# Patient Record
Sex: MALE | Race: WHITE | Employment: OTHER | URBAN - METROPOLITAN AREA
[De-identification: names, ages, dates, MRNs, and addresses within clinical notes are randomized per-mention and may not be internally consistent; named-entity substitution may affect disease eponyms.]

---

## 2020-08-10 ENCOUNTER — TELEPHONE (OUTPATIENT)
Dept: PRIMARY CARE CLINIC | Age: 85
End: 2020-08-10

## 2020-08-10 DIAGNOSIS — E11.40 TYPE 2 DIABETES MELLITUS WITH DIABETIC NEUROPATHY, UNSPECIFIED WHETHER LONG TERM INSULIN USE (HCC): Primary | ICD-10-CM

## 2020-08-10 DIAGNOSIS — E11.40 TYPE 2 DIABETES MELLITUS WITH DIABETIC NEUROPATHY, UNSPECIFIED WHETHER LONG TERM INSULIN USE (HCC): ICD-10-CM

## 2020-08-10 RX ORDER — LANCETS 28 GAUGE
28 EACH MISCELLANEOUS 2 TIMES DAILY
Qty: 100 LANCET | Refills: 4 | Status: SHIPPED | OUTPATIENT
Start: 2020-08-10 | End: 2020-08-11 | Stop reason: SDUPTHER

## 2020-08-10 RX ORDER — LANCETS 28 GAUGE
28 EACH MISCELLANEOUS 2 TIMES DAILY
COMMUNITY
End: 2020-08-10 | Stop reason: SDUPTHER

## 2020-08-11 RX ORDER — LANCETS 28 GAUGE
28 EACH MISCELLANEOUS 2 TIMES DAILY
Qty: 100 LANCET | Refills: 4 | Status: SHIPPED | OUTPATIENT
Start: 2020-08-11 | End: 2021-02-08 | Stop reason: SDUPTHER

## 2020-08-13 RX ORDER — PEN NEEDLE, DIABETIC 31 GX3/16"
NEEDLE, DISPOSABLE MISCELLANEOUS AS DIRECTED
COMMUNITY
End: 2020-08-13 | Stop reason: SDUPTHER

## 2020-08-13 RX ORDER — PEN NEEDLE, DIABETIC 31 GX3/16"
NEEDLE, DISPOSABLE MISCELLANEOUS
Qty: 100 PEN NEEDLE | Refills: 4 | Status: SHIPPED | OUTPATIENT
Start: 2020-08-13 | End: 2021-02-08 | Stop reason: SDUPTHER

## 2020-09-08 LAB — EF %, EXTERNAL: NORMAL

## 2020-12-08 PROBLEM — N28.9 RENAL INSUFFICIENCY: Status: ACTIVE | Noted: 2020-12-08

## 2020-12-08 PROBLEM — D69.6 THROMBOCYTOPENIC DISORDER (HCC): Status: ACTIVE | Noted: 2020-12-08

## 2020-12-08 PROBLEM — E78.5 HYPERLIPIDEMIA: Status: ACTIVE | Noted: 2020-12-08

## 2020-12-08 PROBLEM — E11.9 TYPE II DIABETES MELLITUS (HCC): Status: ACTIVE | Noted: 2020-12-08

## 2020-12-08 PROBLEM — I10 HYPERTENSIVE DISORDER: Status: ACTIVE | Noted: 2020-12-08

## 2020-12-08 PROBLEM — E11.43 AUTONOMIC NEUROPATHY DUE TO DIABETES (HCC): Status: ACTIVE | Noted: 2020-12-08

## 2021-02-08 ENCOUNTER — OFFICE VISIT (OUTPATIENT)
Dept: PRIMARY CARE CLINIC | Age: 86
End: 2021-02-08
Payer: MEDICARE

## 2021-02-08 VITALS
WEIGHT: 191 LBS | BODY MASS INDEX: 29.98 KG/M2 | HEART RATE: 69 BPM | OXYGEN SATURATION: 98 % | SYSTOLIC BLOOD PRESSURE: 150 MMHG | TEMPERATURE: 97.1 F | RESPIRATION RATE: 16 BRPM | HEIGHT: 67 IN | DIASTOLIC BLOOD PRESSURE: 69 MMHG

## 2021-02-08 DIAGNOSIS — M79.674 TOE PAIN, BILATERAL: ICD-10-CM

## 2021-02-08 DIAGNOSIS — N28.9 RENAL INSUFFICIENCY: ICD-10-CM

## 2021-02-08 DIAGNOSIS — M79.675 TOE PAIN, BILATERAL: ICD-10-CM

## 2021-02-08 DIAGNOSIS — I10 ESSENTIAL HYPERTENSION: Primary | ICD-10-CM

## 2021-02-08 DIAGNOSIS — E11.40 TYPE 2 DIABETES MELLITUS WITH DIABETIC NEUROPATHY, UNSPECIFIED WHETHER LONG TERM INSULIN USE (HCC): ICD-10-CM

## 2021-02-08 DIAGNOSIS — E78.2 MIXED HYPERLIPIDEMIA: ICD-10-CM

## 2021-02-08 PROCEDURE — 99214 OFFICE O/P EST MOD 30 MIN: CPT | Performed by: NURSE PRACTITIONER

## 2021-02-08 PROCEDURE — G8536 NO DOC ELDER MAL SCRN: HCPCS | Performed by: NURSE PRACTITIONER

## 2021-02-08 PROCEDURE — G8419 CALC BMI OUT NRM PARAM NOF/U: HCPCS | Performed by: NURSE PRACTITIONER

## 2021-02-08 PROCEDURE — G8427 DOCREV CUR MEDS BY ELIG CLIN: HCPCS | Performed by: NURSE PRACTITIONER

## 2021-02-08 PROCEDURE — 1101F PT FALLS ASSESS-DOCD LE1/YR: CPT | Performed by: NURSE PRACTITIONER

## 2021-02-08 PROCEDURE — G8432 DEP SCR NOT DOC, RNG: HCPCS | Performed by: NURSE PRACTITIONER

## 2021-02-08 RX ORDER — INSULIN GLARGINE 100 [IU]/ML
INJECTION, SOLUTION SUBCUTANEOUS
COMMUNITY
End: 2021-02-08 | Stop reason: SDUPTHER

## 2021-02-08 RX ORDER — SIMVASTATIN 20 MG/1
TABLET, FILM COATED ORAL
Qty: 90 TAB | Refills: 1 | Status: SHIPPED | OUTPATIENT
Start: 2021-02-08 | End: 2021-10-11 | Stop reason: SDUPTHER

## 2021-02-08 RX ORDER — SITAGLIPTIN 50 MG/1
50 TABLET, FILM COATED ORAL 2 TIMES DAILY
Qty: 180 TAB | Refills: 1 | Status: SHIPPED | OUTPATIENT
Start: 2021-02-08 | End: 2021-11-24 | Stop reason: SDUPTHER

## 2021-02-08 RX ORDER — LANCETS 28 GAUGE
28 EACH MISCELLANEOUS 2 TIMES DAILY
Qty: 100 LANCET | Refills: 4 | Status: SHIPPED | OUTPATIENT
Start: 2021-02-08

## 2021-02-08 RX ORDER — SIMVASTATIN 20 MG/1
TABLET, FILM COATED ORAL
COMMUNITY
End: 2021-02-08 | Stop reason: SDUPTHER

## 2021-02-08 RX ORDER — SITAGLIPTIN 50 MG/1
TABLET, FILM COATED ORAL
COMMUNITY
Start: 2020-12-08 | End: 2021-02-08 | Stop reason: SDUPTHER

## 2021-02-08 RX ORDER — INDAPAMIDE 1.25 MG/1
TABLET, FILM COATED ORAL
COMMUNITY
End: 2021-02-08 | Stop reason: SDUPTHER

## 2021-02-08 RX ORDER — PEN NEEDLE, DIABETIC 31 GX3/16"
NEEDLE, DISPOSABLE MISCELLANEOUS
Qty: 100 PEN NEEDLE | Refills: 4 | Status: SHIPPED | OUTPATIENT
Start: 2021-02-08 | End: 2021-11-30 | Stop reason: SDUPTHER

## 2021-02-08 RX ORDER — GABAPENTIN 100 MG/1
CAPSULE ORAL
COMMUNITY
End: 2021-02-08 | Stop reason: SDUPTHER

## 2021-02-08 RX ORDER — GABAPENTIN 100 MG/1
CAPSULE ORAL
Qty: 270 CAP | Refills: 1 | Status: SHIPPED | OUTPATIENT
Start: 2021-02-08 | End: 2021-05-04 | Stop reason: SDUPTHER

## 2021-02-08 RX ORDER — METOPROLOL SUCCINATE 50 MG/1
TABLET, EXTENDED RELEASE ORAL
COMMUNITY
End: 2021-02-08 | Stop reason: SDUPTHER

## 2021-02-08 RX ORDER — INSULIN GLARGINE 100 [IU]/ML
INJECTION, SOLUTION SUBCUTANEOUS
Qty: 3 PEN | Refills: 1 | Status: SHIPPED | OUTPATIENT
Start: 2021-02-08 | End: 2021-11-30 | Stop reason: SDUPTHER

## 2021-02-08 RX ORDER — INSULIN GLARGINE 300 U/ML
INJECTION, SOLUTION SUBCUTANEOUS
COMMUNITY
End: 2021-02-08

## 2021-02-08 RX ORDER — METOPROLOL SUCCINATE 50 MG/1
TABLET, EXTENDED RELEASE ORAL
Qty: 90 TAB | Refills: 1 | Status: SHIPPED | OUTPATIENT
Start: 2021-02-08 | End: 2021-11-24 | Stop reason: SDUPTHER

## 2021-02-08 RX ORDER — INDAPAMIDE 1.25 MG/1
TABLET, FILM COATED ORAL
Qty: 90 TAB | Refills: 1 | Status: SHIPPED | OUTPATIENT
Start: 2021-02-08 | End: 2021-08-30

## 2021-02-08 NOTE — PATIENT INSTRUCTIONS
High Blood Pressure: Care Instructions Overview It's normal for blood pressure to go up and down throughout the day. But if it stays up, you have high blood pressure. Another name for high blood pressure is hypertension. Despite what a lot of people think, high blood pressure usually doesn't cause headaches or make you feel dizzy or lightheaded. It usually has no symptoms. But it does increase your risk of stroke, heart attack, and other problems. You and your doctor will talk about your risks of these problems based on your blood pressure. Your doctor will give you a goal for your blood pressure. Your goal will be based on your health and your age. Lifestyle changes, such as eating healthy and being active, are always important to help lower blood pressure. You might also take medicine to reach your blood pressure goal. 
Follow-up care is a key part of your treatment and safety. Be sure to make and go to all appointments, and call your doctor if you are having problems. It's also a good idea to know your test results and keep a list of the medicines you take. How can you care for yourself at home? Medical treatment · If you stop taking your medicine, your blood pressure will go back up. You may take one or more types of medicine to lower your blood pressure. Be safe with medicines. Take your medicine exactly as prescribed. Call your doctor if you think you are having a problem with your medicine. · Talk to your doctor before you start taking aspirin every day. Aspirin can help certain people lower their risk of a heart attack or stroke. But taking aspirin isn't right for everyone, because it can cause serious bleeding. · See your doctor regularly. You may need to see the doctor more often at first or until your blood pressure comes down. · If you are taking blood pressure medicine, talk to your doctor before you take decongestants or anti-inflammatory medicine, such as ibuprofen. Some of these medicines can raise blood pressure. · Learn how to check your blood pressure at home. Lifestyle changes · Stay at a healthy weight. This is especially important if you put on weight around the waist. Losing even 10 pounds can help you lower your blood pressure. · If your doctor recommends it, get more exercise. Walking is a good choice. Bit by bit, increase the amount you walk every day. Try for at least 30 minutes on most days of the week. You also may want to swim, bike, or do other activities. · Avoid or limit alcohol. Talk to your doctor about whether you can drink any alcohol. · Try to limit how much sodium you eat to less than 2,300 milligrams (mg) a day. Your doctor may ask you to try to eat less than 1,500 mg a day. · Eat plenty of fruits (such as bananas and oranges), vegetables, legumes, whole grains, and low-fat dairy products. · Lower the amount of saturated fat in your diet. Saturated fat is found in animal products such as milk, cheese, and meat. Limiting these foods may help you lose weight and also lower your risk for heart disease. · Do not smoke. Smoking increases your risk for heart attack and stroke. If you need help quitting, talk to your doctor about stop-smoking programs and medicines. These can increase your chances of quitting for good. When should you call for help? Call  911 anytime you think you may need emergency care. This may mean having symptoms that suggest that your blood pressure is causing a serious heart or blood vessel problem. Your blood pressure may be over 180/120. For example, call 911 if: 
  · You have symptoms of a heart attack. These may include: 
? Chest pain or pressure, or a strange feeling in the chest. 
? Sweating. ? Shortness of breath. ? Nausea or vomiting. ? Pain, pressure, or a strange feeling in the back, neck, jaw, or upper belly or in one or both shoulders or arms. ? Lightheadedness or sudden weakness. ? A fast or irregular heartbeat.  
  · You have symptoms of a stroke. These may include: 
? Sudden numbness, tingling, weakness, or loss of movement in your face, arm, or leg, especially on only one side of your body. ? Sudden vision changes. ? Sudden trouble speaking. ? Sudden confusion or trouble understanding simple statements. ? Sudden problems with walking or balance. ? A sudden, severe headache that is different from past headaches.  
  · You have severe back or belly pain. Do not wait until your blood pressure comes down on its own. Get help right away. Call your doctor now or seek immediate care if: 
  · Your blood pressure is much higher than normal (such as 180/120 or higher), but you don't have symptoms.  
  · You think high blood pressure is causing symptoms, such as: 
? Severe headache. 
? Blurry vision. Watch closely for changes in your health, and be sure to contact your doctor if: 
  · Your blood pressure measures higher than your doctor recommends at least 2 times. That means the top number is higher or the bottom number is higher, or both.  
  · You think you may be having side effects from your blood pressure medicine. Where can you learn more? Go to http://www.gray.com/ Enter O629 in the search box to learn more about \"High Blood Pressure: Care Instructions. \" Current as of: December 16, 2019               Content Version: 12.6 © 2150-5137 [employer]. Care instructions adapted under license by [employer] (which disclaims liability or warranty for this information). If you have questions about a medical condition or this instruction, always ask your healthcare professional. [address] any warranty or liability for your use of this information. Diabetes Foot Health: Care Instructions Your Care Instructions When you have diabetes, your feet need extra care and attention. Diabetes can damage the nerve endings and blood vessels in your feet, making you less likely to notice when your feet are injured. Diabetes also limits your body's ability to fight infection and get blood to areas that need it. If you get a minor foot injury, it could become an ulcer or a serious infection. With good foot care, you can prevent most of these problems. Caring for your feet can be quick and easy. Most of the care can be done when you are bathing or getting ready for bed. Follow-up care is a key part of your treatment and safety. Be sure to make and go to all appointments, and call your doctor if you are having problems. It's also a good idea to know your test results and keep a list of the medicines you take. How can you care for yourself at home? · Keep your blood sugar close to normal by watching what and how much you eat, monitoring blood sugar, taking medicines if prescribed, and getting regular exercise. · Do not smoke. Smoking affects blood flow and can make foot problems worse. If you need help quitting, talk to your doctor about stop-smoking programs and medicines. These can increase your chances of quitting for good. · Eat a diet that is low in fats. High fat intake can cause fat to build up in your blood vessels and decrease blood flow. · Inspect your feet daily for blisters, cuts, cracks, or sores. If you cannot see well, use a mirror or have someone help you. · Take care of your feet: 
? Wash your feet every day. Use warm (not hot) water. Check the water temperature with your wrists or other part of your body, not your feet. ? Dry your feet well. Pat them dry. Do not rub the skin on your feet too hard. Dry well between your toes. If the skin on your feet stays moist, bacteria or a fungus can grow, which can lead to infection. ? Keep your skin soft. Use moisturizing skin cream to keep the skin on your feet soft and prevent calluses and cracks. But do not put the cream between your toes, and stop using any cream that causes a rash. ? Clean underneath your toenails carefully. Do not use a sharp object to clean underneath your toenails. Use the blunt end of a nail file or other rounded tool. ? Trim and file your toenails straight across to prevent ingrown toenails. Use a nail clipper, not scissors. Use an emery board to smooth the edges. · Change socks daily. Socks without seams are best, because seams often rub the feet. You can find socks for people with diabetes from specialty catalogs. · Look inside your shoes every day for things like gravel or torn linings, which could cause blisters or sores. · Buy shoes that fit well: 
? Look for shoes that have plenty of space around the toes. This helps prevent bunions and blisters. ? Try on shoes while wearing the kind of socks you will usually wear with the shoes. ? Avoid plastic shoes. They may rub your feet and cause blisters. Good shoes should be made of materials that are flexible and breathable, such as leather or cloth. ? Break in new shoes slowly by wearing them for no more than an hour a day for several days. Take extra time to check your feet for red areas, blisters, or other problems after you wear new shoes. · Do not go barefoot. Do not wear sandals, and do not wear shoes with very thin soles. Thin soles are easy to puncture. They also do not protect your feet from hot pavement or cold weather. · Have your doctor check your feet during each visit. If you have a foot problem, see your doctor. Do not try to treat an early foot problem at home. Home remedies or treatments that you can buy without a prescription (such as corn removers) can be harmful. · Always get early treatment for foot problems. A minor irritation can lead to a major problem if not properly cared for early. When should you call for help? 
 Call your doctor now or seek immediate medical care if: 
  · You have a foot sore, an ulcer or break in the skin that is not healing after 4 days, bleeding corns or calluses, or an ingrown toenail.  
  · You have blue or black areas, which can mean bruising or blood flow problems.  
  · You have peeling skin or tiny blisters between your toes or cracking or oozing of the skin.  
  · You have a fever for more than 24 hours and a foot sore.  
  · You have new numbness or tingling in your feet that does not go away after you move your feet or change positions.  
  · You have unexplained or unusual swelling of the foot or ankle.  
Watch closely for changes in your health, and be sure to contact your doctor if: 
  · You cannot do proper foot care.  
Where can you learn more? 
Go to https://www.[email or web address] 
Enter A739 in the search box to learn more about \"Diabetes Foot Health: Care Instructions.\" 
Current as of: December 20, 2019               Content Version: 12.6 
© 0901-4851 [employer].  
Care instructions adapted under license by [employer] (which disclaims liability or warranty for this information). If you have questions about a medical condition or this instruction, always ask your healthcare professional. [employer] disclaims any warranty or liability for your use of this information. 
 
 
 
  
Learning About Tests When You Have Diabetes 
Why do you need regular tests? 
  
Diabetes can lead to other health problems if it's not well controlled. You'll need tests to monitor how well your diabetes is controlled and to check for other things like high cholesterol or kidney problems. Having tests on a regular schedule can help your doctor find problems early, when it's easier to manage them. 
What tests do you need? 
These are the tests you may need and how often you should have them. 
A1c blood test.  
 This test shows the average level of blood sugar over the past 2 to 3 months. It helps your doctor see whether blood sugar levels have been staying within your target range. How often: Every 3 to 6 months Goal: A blood sugar level in your target range Blood pressure test.  
This test measures the pressure of blood flow in the arteries. Controlling blood pressure can help prevent damage to nerves and blood vessels. How often: Every 3 to 6 months Goal: A blood pressure level in your target range Cholesterol test.  
This test measures the amount of a type of fat in the blood. It is common for people with diabetes to also have high cholesterol. Too much cholesterol in the blood can build up inside the blood vessels and raise the risk for heart attack and stroke. How often: At the time of your diabetes diagnosis, and as often as your doctor recommends after that Goal: A cholesterol level in your target range Albumin-creatinine ratio test.  
This test checks for kidney damage by looking for the protein albumin (say \"al-BYOO-stefan\") in the urine. Albumin is normally found in the blood. Kidney damage can let small amounts of it (microalbumin) leak into the urine. How often: Once a year Goal: No protein in the urine Blood creatinine test/estimated glomerular filtration (eGFR). The blood creatinine (say \"vnth-ND-ba-neen\") level shows how well your kidneys are working. Creatinine is a waste product that muscles release into the blood. Blood creatinine is used to estimate the glomerular filtration rate. A high level of creatinine and/or a low eGFR may mean your kidneys are not working as well as they should. How often: Once a year Goal: Normal level of creatinine in the blood. The eGFR goal is greater than 60 mL/min/1.73 m². Complete foot exam.  
The doctor checks for foot sores and whether any sensation has been lost. 
How often: Once a year Goal: Healthy feet with no foot ulcers or loss of feeling Dental exam and cleaning. The dentist checks for gum disease and tooth decay. People with high blood sugar are more likely to have these problems. How often: Every 6 months Goal: Healthy teeth and gums Complete eye exam.  
High blood sugar levels can damage the eyes. This exam is done by an ophthalmologist or optometrist. It includes a dilated eye exam. The exam shows whether there's damage to the back of the eye (diabetic retinopathy). How often: Once a year. If you don't have any signs of diabetic retinopathy, your doctor may recommend an exam every 2 years. Goal: No damage to the back of the eye Thyroid-stimulating hormone (TSH) blood test.  
This test checks for thyroid disease. Too little thyroid hormone can cause some medicines (like insulin) to stay in the body longer. This can cause low blood sugar. You may be tested if you have high cholesterol or are a woman over 48years old. How often: As part of your diabetes diagnosis, and as often as your doctor recommends after that Goal: Normal level of TSH in the blood Follow-up care is a key part of your treatment and safety. Be sure to make and go to all appointments, and call your doctor if you are having problems. It's also a good idea to know your test results and keep a list of the medicines you take. Where can you learn more? Go to http://www.gray.com/ Enter [id number] in the search box to learn more about \"Learning About Tests When You Have Diabetes. \" Current as of: December 20, 2019               Content Version: 12.6 © 8324-9506 [employer]. Care instructions adapted under license by [employer] (which disclaims liability or warranty for this information). If you have questions about a medical condition or this instruction, always ask your healthcare professional. [address] any warranty or liability for your use of this information.

## 2021-02-08 NOTE — PROGRESS NOTES
HISTORY OF PRESENT ILLNESS  Dl Horn is a 80 y.o. male presents to the office for medication refill and lab work    . Hypertension: Patients hypertension is well controlled on regimen of metoprolol and indapamide. Denies headaches, blurred vision or dizziness. . Diabetes: Last A1c was 6.9 in June 2020 . Diabetes well controlled on januvia and insulin 10inuts of glargine Patient's last eye exam was more than a year ago, . Does have bilateral toe pain at times? No numbness    . Hyperlipidemia: Mixed hyperlipidemia well controlled on Simvastatin. Denies any complications from medications.      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    Renal insufficiency N28.9    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[address] D69.6    Type II diabetes mellitus ([address] E11.9     Patient Active Problem List    Diagnosis Date Noted    Hyperlipidemia 12/08/2020    Hypertensive disorder 12/08/2020    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 ([address] 12/08/2020    Renal insufficiency 12/08/2020    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[facility] 12/08/2020    Type II diabetes mellitus ([facility] 12/08/2020     Current Outpatient Medications   Medication Sig Dispense Refill    gabapentin (NEURONTIN) 100 mg capsule gabapentin 100 mg capsule   TK 1 C PO TID      indapamide (LOZOL) 1.25 mg tablet indapamide 1.25 mg tablet   TK 1 T PO QAM      insulin glargine U-300 conc (Toujeo SoloStar U-300 Insulin) 300 unit/mL (1.5 mL) inpn pen Toujeo SoloStar U-300 Insulin 300 unit/mL (1.5 mL) subcutaneous pen   Inject 6 units every day by subcutaneous route as needed.  insulin glargine (Lantus Solostar U-100 Insulin) 100 unit/mL (3 mL) inpn Lantus Solostar U-100 Insulin 100 unit/mL (3 mL) subcutaneous pen      metoprolol succinate (TOPROL-XL) 50 mg XL tablet metoprolol succinate ER 50 mg tablet,extended release 24 hr   TK 1 T PO QD      simvastatin (ZOCOR) 20 mg tablet simvastatin 20 mg tablet   TAKE 1 TABLET DAILY IN THE EVENING      Januvia 50 mg tablet       Insulin Needles, Disposable, 32 gauge x 5/32\" ndle Use UTD  E11.40: Type 2 diabetes mellitus with diabetic neuropathy, unspecified 100 Pen Needle 4    lancets 28 gauge misc 28 Lancet by Does Not Apply route two (2) times a day. Use as Directed; E11.40: Type 2 diabetes mellitus with diabetic neuropathy, unspecified 100 Lancet 4     Allergies   Allergen Reactions    Penicillins Unknown (comments)     Past Medical History:   Diagnosis Date    Diabetes ([facility]     Hypercholesterolemia     Hypertension      No past surgical history on file. Family History   Problem Relation Age of Onset    Diabetes Other     Hypertension Other      Social History     Tobacco Use    Smoking status: Never Smoker    Smokeless tobacco: Never Used   Substance Use Topics    Alcohol use: Not on file           Review of Systems   Constitutional: Negative for fever and weight loss. HENT: Negative for sore throat and tinnitus. Eyes: Negative for blurred vision and double vision. Respiratory: Negative for shortness of breath. Cardiovascular: Negative for chest pain, palpitations and leg swelling. Gastrointestinal: Positive for abdominal pain, constipation and diarrhea. Musculoskeletal: Positive for joint pain (bilateral toe pain at times). Skin: Negative for itching and rash. Neurological: Negative for dizziness. Endo/Heme/Allergies: Does not bruise/bleed easily. Psychiatric/Behavioral: Negative for depression. The patient is not nervous/anxious and does not have insomnia. Physical Exam  Vitals signs reviewed. Constitutional:       Appearance: Normal appearance. HENT:      Head: Normocephalic. Nose: Nose normal.      Mouth/Throat:      Mouth: Mucous membranes are moist.   Eyes:      Extraocular Movements: Extraocular movements intact. Pupils: Pupils are equal, round, and reactive to light. Neck:      Musculoskeletal: Normal range of motion and neck supple. Cardiovascular:      Rate and Rhythm: Normal rate and regular rhythm. Pulses: Normal pulses. Heart sounds: Normal heart sounds. Pulmonary:      Effort: Pulmonary effort is normal.      Breath sounds: Normal breath sounds. Abdominal:      Palpations: Abdomen is soft. Tenderness: There is abdominal tenderness (generalized tenderness). Musculoskeletal: Normal range of motion. Feet:      Right foot:      Skin integrity: No skin breakdown, erythema, callus or dry skin. Left foot:      Skin integrity: No skin breakdown, erythema, callus or dry skin. Toenail Condition: Left toenails are abnormally thick. Fungal disease present. Skin:     General: Skin is warm and dry. Neurological:      General: No focal deficit present. Mental Status: He is alert and oriented to person, place, and time. Psychiatric:         Attention and Perception: Attention and perception normal.         Mood and Affect: Affect normal. Mood is depressed. Speech: Speech normal.         Behavior: Behavior normal. Behavior is cooperative. Thought Content: Thought content normal.         Cognition and Memory: Cognition and memory normal.         Judgment: Judgment normal.           ASSESSMENT and PLAN    1.  Type 2 diabetes mellitus with diabetic neuropathy, unspecified whether long term insulin use (HCC)  Refill meds/ labs  - gabapentin (NEURONTIN) 100 mg capsule; gabapentin 100 mg capsule  TK 1 C PO TID  Dispense: 270 Cap; Refill: 1  - insulin glargine (Lantus Solostar U-100 Insulin) 100 unit/mL (3 mL) inpn; 10 units every morning  Dispense: 3 Pen; Refill: 1  - Insulin Needles, Disposable, 32 gauge x 5/32\" ndle; Use UTD  E11.40: Type 2 diabetes mellitus with diabetic neuropathy, unspecified  Dispense: 100 Pen Needle; Refill: 4  - Januvia 50 mg tablet; Take 1 Tab by mouth two (2) times a day. Dispense: 180 Tab; Refill: 1  - lancets 28 gauge misc; 28 Lancet by Does Not Apply route two (2) times a day. Use as Directed; E11.40: Type 2 diabetes mellitus with diabetic neuropathy, unspecified  Dispense: 100 Lancet; Refill: 4  - CBC WITH AUTOMATED DIFF  - METABOLIC PANEL, COMPREHENSIVE  - HEMOGLOBIN A1C WITH EAG  - HM DIABETES FOOT EXAM  - MICROALBUMIN, UR, RAND W/ MICROALB/CREAT RATIO  - LIPID PANEL AND CHOL/HDL RATIO    2. Essential hypertension  Refill meds/ labs  - indapamide (LOZOL) 1.25 mg tablet; indapamide 1.25 mg tablet  TK 1 T PO QAM  Dispense: 90 Tab; Refill: 1  - metoprolol succinate (TOPROL-XL) 50 mg XL tablet; metoprolol succinate ER 50 mg tablet,extended release 24 hr  TK 1 T PO QD  Dispense: 90 Tab; Refill: 1  - METABOLIC PANEL, COMPREHENSIVE    3. Mixed hyperlipidemia  Refill meds and labs  - simvastatin (ZOCOR) 20 mg tablet; simvastatin 20 mg tablet  TAKE 1 TABLET DAILY IN THE EVENING  Dispense: 90 Tab; Refill: 1  - METABOLIC PANEL, COMPREHENSIVE  - LIPID PANEL AND CHOL/HDL RATIO    4. Renal insufficiency  Check labs  - METABOLIC PANEL, COMPREHENSIVE    5.  Toe pain, bilateral  Check for gout  - URIC ACID        Quentin Antonio, CHASITY

## 2021-02-09 LAB
ALBUMIN SERPL-MCNC: 4 G/DL (ref 3.5–4.6)
ALBUMIN/GLOB SERPL: 1.4 {RATIO} (ref 1.2–2.2)
ALP SERPL-CCNC: 85 IU/L (ref 39–117)
ALT SERPL-CCNC: 14 IU/L (ref 0–44)
AST SERPL-CCNC: 20 IU/L (ref 0–40)
BASOPHILS # BLD AUTO: 0 X10E3/UL (ref 0–0.2)
BASOPHILS NFR BLD AUTO: 1 %
BILIRUB SERPL-MCNC: 0.7 MG/DL (ref 0–1.2)
BUN SERPL-MCNC: 19 MG/DL (ref 10–36)
BUN/CREAT SERPL: 14 (ref 10–24)
CALCIUM SERPL-MCNC: 9.3 MG/DL (ref 8.6–10.2)
CHLORIDE SERPL-SCNC: 98 MMOL/L (ref 96–106)
CHOLEST SERPL-MCNC: 140 MG/DL (ref 100–199)
CHOLEST/HDLC SERPL: 2.7 RATIO (ref 0–5)
CO2 SERPL-SCNC: 29 MMOL/L (ref 20–29)
CREAT SERPL-MCNC: 1.37 MG/DL (ref 0.76–1.27)
EOSINOPHIL # BLD AUTO: 0.3 X10E3/UL (ref 0–0.4)
EOSINOPHIL NFR BLD AUTO: 3 %
ERYTHROCYTE [DISTWIDTH] IN BLOOD BY AUTOMATED COUNT: 12.2 % (ref 11.6–15.4)
EST. AVERAGE GLUCOSE BLD GHB EST-MCNC: 166 MG/DL
GLOBULIN SER CALC-MCNC: 2.8 G/DL (ref 1.5–4.5)
GLUCOSE SERPL-MCNC: 148 MG/DL (ref 65–99)
HBA1C MFR BLD: 7.4 % (ref 4.8–5.6)
HCT VFR BLD AUTO: 42.1 % (ref 37.5–51)
HDLC SERPL-MCNC: 51 MG/DL
HGB BLD-MCNC: 14.1 G/DL (ref 13–17.7)
IMM GRANULOCYTES # BLD AUTO: 0.1 X10E3/UL (ref 0–0.1)
IMM GRANULOCYTES NFR BLD AUTO: 1 %
LDLC SERPL CALC-MCNC: 64 MG/DL (ref 0–99)
LYMPHOCYTES # BLD AUTO: 1.2 X10E3/UL (ref 0.7–3.1)
LYMPHOCYTES NFR BLD AUTO: 14 %
MCH RBC QN AUTO: 29 PG (ref 26.6–33)
MCHC RBC AUTO-ENTMCNC: 33.5 G/DL (ref 31.5–35.7)
MCV RBC AUTO: 86 FL (ref 79–97)
MONOCYTES # BLD AUTO: 0.6 X10E3/UL (ref 0.1–0.9)
MONOCYTES NFR BLD AUTO: 7 %
NEUTROPHILS # BLD AUTO: 6.2 X10E3/UL (ref 1.4–7)
NEUTROPHILS NFR BLD AUTO: 74 %
PLATELET # BLD AUTO: 171 X10E3/UL (ref 150–450)
POTASSIUM SERPL-SCNC: 3.9 MMOL/L (ref 3.5–5.2)
PROT SERPL-MCNC: 6.8 G/DL (ref 6–8.5)
RBC # BLD AUTO: 4.87 X10E6/UL (ref 4.14–5.8)
SODIUM SERPL-SCNC: 142 MMOL/L (ref 134–144)
SPECIMEN STATUS REPORT, ROLRST: NORMAL
TRIGL SERPL-MCNC: 148 MG/DL (ref 0–149)
URATE SERPL-MCNC: 7.2 MG/DL (ref 3.8–8.4)
VLDLC SERPL CALC-MCNC: 25 MG/DL (ref 5–40)
WBC # BLD AUTO: 8.3 X10E3/UL (ref 3.4–10.8)

## 2021-02-11 NOTE — PROGRESS NOTES
Please call patient about labs.      7.4 a1c, kidneys are borderline (keep followig up with kidney dr) but otherwise we ar elooking good , no changes

## 2021-05-04 ENCOUNTER — OFFICE VISIT (OUTPATIENT)
Dept: PRIMARY CARE CLINIC | Age: 86
End: 2021-05-04
Payer: MEDICARE

## 2021-05-04 VITALS
BODY MASS INDEX: 29.03 KG/M2 | WEIGHT: 185 LBS | RESPIRATION RATE: 18 BRPM | DIASTOLIC BLOOD PRESSURE: 68 MMHG | SYSTOLIC BLOOD PRESSURE: 125 MMHG | HEIGHT: 67 IN | HEART RATE: 63 BPM | OXYGEN SATURATION: 97 % | TEMPERATURE: 97.3 F

## 2021-05-04 DIAGNOSIS — R53.83 FATIGUE, UNSPECIFIED TYPE: Primary | ICD-10-CM

## 2021-05-04 DIAGNOSIS — E11.40 TYPE 2 DIABETES MELLITUS WITH DIABETIC NEUROPATHY, UNSPECIFIED WHETHER LONG TERM INSULIN USE (HCC): Chronic | ICD-10-CM

## 2021-05-04 DIAGNOSIS — Z00.00 ANNUAL PHYSICAL EXAM: ICD-10-CM

## 2021-05-04 PROCEDURE — 3051F HG A1C>EQUAL 7.0%<8.0%: CPT | Performed by: NURSE PRACTITIONER

## 2021-05-04 PROCEDURE — G8419 CALC BMI OUT NRM PARAM NOF/U: HCPCS | Performed by: NURSE PRACTITIONER

## 2021-05-04 PROCEDURE — G8432 DEP SCR NOT DOC, RNG: HCPCS | Performed by: NURSE PRACTITIONER

## 2021-05-04 PROCEDURE — G0439 PPPS, SUBSEQ VISIT: HCPCS | Performed by: NURSE PRACTITIONER

## 2021-05-04 PROCEDURE — G8536 NO DOC ELDER MAL SCRN: HCPCS | Performed by: NURSE PRACTITIONER

## 2021-05-04 PROCEDURE — 99214 OFFICE O/P EST MOD 30 MIN: CPT | Performed by: NURSE PRACTITIONER

## 2021-05-04 PROCEDURE — 1101F PT FALLS ASSESS-DOCD LE1/YR: CPT | Performed by: NURSE PRACTITIONER

## 2021-05-04 PROCEDURE — G8427 DOCREV CUR MEDS BY ELIG CLIN: HCPCS | Performed by: NURSE PRACTITIONER

## 2021-05-04 RX ORDER — GABAPENTIN 100 MG/1
CAPSULE ORAL
Qty: 270 CAP | Refills: 1 | Status: SHIPPED | OUTPATIENT
Start: 2021-05-04 | End: 2021-11-30

## 2021-05-04 RX ORDER — GABAPENTIN 300 MG/1
300 CAPSULE ORAL DAILY
Qty: 90 CAP | Refills: 1 | Status: SHIPPED | OUTPATIENT
Start: 2021-05-04 | End: 2021-08-18

## 2021-05-04 NOTE — PROGRESS NOTES
This is the Subsequent Medicare Annual Wellness Exam, performed 12 months or more after the Initial AWV or the last Subsequent AWV    I have reviewed the patient's medical history in detail and updated the computerized patient record. Assessment/Plan   Education and counseling provided:  Are appropriate based on today's review and evaluation  Pneumococcal Vaccine  Influenza Vaccine  Cardiovascular screening blood test  Diabetes screening test  covid 19    1. Fatigue, unspecified type  Comments:  labs primarily for fatigue. likely is renal studies are still abnormal we will send to nephrology; sees cards ? beta blockers? Orders:  -     CBC WITH AUTOMATED DIFF  -     MAGNESIUM  -     METABOLIC PANEL, COMPREHENSIVE  -     TSH RFX ON ABNORMAL TO FREE T4  2. Type 2 diabetes mellitus with diabetic neuropathy, unspecified whether long term insulin use (HCC)  Comments:  worried about neuopathy will reoder his gabapentin  Orders:  -     gabapentin (NEURONTIN) 100 mg capsule; gabapentin 100 mg capsule  TK 1 C PO TID, Normal, Disp-270 Cap, R-1  -     gabapentin (NEURONTIN) 300 mg capsule; Take 1 Cap by mouth daily. Max Daily Amount: 300 mg., Normal, Disp-90 Cap, R-1       Depression Risk Factor Screening     3 most recent PHQ Screens 5/4/2021   Little interest or pleasure in doing things Not at all   Feeling down, depressed, irritable, or hopeless Not at all   Total Score PHQ 2 0       Alcohol Risk Screen    Do you average more than 1 drink per night or more than 7 drinks a week: No    In the past three months have you have had more than 4 drinks containing alcohol on one occasion: No        Functional Ability and Level of Safety    Hearing: has hearing aids but doesnt usue them \"they hurt my ear\"      Activities of Daily Living: The home contains: no safety equipment. Patient does total self care      Ambulation: with no difficulty     Fall Risk:  Fall Risk Assessment, last 12 mths 5/4/2021   Able to walk?  Yes   Fall in past 12 months? 0   Do you feel unsteady? 1   Are you worried about falling 1   Is the gait abnormal? 1   Number of falls in past 12 months -   Fall with injury? -      Abuse Screen:  Patient is not abused       Cognitive Screening    Has your family/caregiver stated any concerns about your memory: no     Cognitive Screening: normal interview    Health Maintenance Due     Health Maintenance Due   Topic Date Due    MICROALBUMIN Q1  Never done    Eye Exam Retinal or Dilated  Never done    COVID-19 Vaccine (1) Never done    DTaP/Tdap/Td series (1 - Tdap) Never done    Shingrix Vaccine Age 50> (1 of 2) Never done    Pneumococcal 65+ years (1 of 1 - PPSV23) Never done    Medicare Yearly Exam  Never done       Patient Care Team   Patient Care Team:  Gerald Davalos MD as PCP - General (Family Medicine)  Gerald Davalos MD as PCP - [facility] Empaneled Provider    History     Patient Active Problem List   Diagnosis Code    Hyperlipidemia E78.5    Hypertensive disorder I10    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 No pertinent surgical history. Current Outpatient Medications   Medication Sig Dispense Refill    gabapentin (NEURONTIN) 100 mg capsule gabapentin 100 mg capsule  TK 1 C PO  Cap 1    gabapentin (NEURONTIN) 300 mg capsule Take 1 Cap by mouth daily.  Max Daily Amount: 300 mg. 90 Cap 1    indapamide (LOZOL) 1.25 mg tablet indapamide 1.25 mg tablet  TK 1 T PO QAM 90 Tab 1    insulin glargine (Lantus Solostar U-100 Insulin) 100 unit/mL (3 mL) inpn 10 units every morning 3 Pen 1    Insulin Needles, Disposable, 32 gauge x 5/32\" ndle Use UTD  E11.40: Type 2 diabetes mellitus with diabetic neuropathy, unspecified 100 Pen Needle 4    Januvia 50 mg tablet Take 1 Tab by mouth two (2) times a day. 180 Tab 1    lancets 28 gauge misc 28 Lancet by Does Not Apply route two (2) times a day.  Use as Directed; E11.40: Type 2 diabetes mellitus with diabetic neuropathy, unspecified 100 Lancet 4    metoprolol succinate (TOPROL-XL) 50 mg XL tablet metoprolol succinate ER 50 mg tablet,extended release 24 hr  TK 1 T PO QD 90 Tab 1    simvastatin (ZOCOR) 20 mg tablet simvastatin 20 mg tablet  TAKE 1 TABLET DAILY IN THE EVENING 90 Tab 1     Allergies   Allergen Reactions    Penicillins Unknown (comments)       Family History   Problem Relation Age of Onset    Diabetes Other     Hypertension Other      Social History     Tobacco Use    Smoking status: Never Smoker    Smokeless tobacco: Never Used   Substance Use Topics    Alcohol use: Not Currently         Anuradha Malcolm NP

## 2021-05-04 NOTE — PATIENT INSTRUCTIONS
Learning About Being Physically Active What is physical activity? Being physically active means doing any kind of activity that gets your body moving. The types of physical activity that can help you get fit and stay healthy include: · Aerobic or \"cardio\" activities. These make your heart beat faster and make you breathe harder, such as brisk walking, riding a bike, or running. They strengthen your heart and lungs and build up your endurance. · Strength training activities. These make your muscles work against, or \"resist,\" something. Examples include lifting weights or doing push-ups. These activities help tone and strengthen your muscles and bones. · Stretches. These let you move your joints and muscles through their full range of motion. Stretching helps you be more flexible. What are the benefits of being active? Being active is one of the best things you can do for your health. It helps you to: · Feel stronger and have more energy to do all the things you like to do. · Focus better at school or work. · Feel, think, and sleep better. · Reach and stay at a healthy weight. · Lose fat and build lean muscle. · Lower your risk for serious health problems, including diabetes, heart attack, high blood pressure, and some cancers. · Keep your heart, lungs, bones, muscles, and joints strong and healthy. How can you make being active part of your life? Start slowly. Make it your long-term goal to get at least 30 minutes of exercise on most days of the week. Walking is a good choice. You also may want to do other activities, such as running, swimming, cycling, or playing tennis or team sports. Pick activities that you likeones that make your heart beat faster, your muscles stronger, and your muscles and joints more flexible. If you find more than one thing you like doing, do them all. You don't have to do the same thing every day. Get your heart pumping every day.  Any activity that makes your heart beat faster and keeps it at that rate for a while counts. Here are some great ways to get your heart beating faster: · Go for a brisk walk, run, or bike ride. · Go for a hike or swim. · Go in-line skating. · Play a game of touch football, basketball, or soccer. · Ride a bike. · Play tennis or racquetball. · Climb stairs. Even some household chores can be aerobicjust do them at a faster pace. Vacuuming, raking or mowing the lawn, sweeping the garage, and washing and waxing the car all can help get your heart rate up. Strengthen your muscles during the week. You don't have to lift heavy weights or grow big, bulky muscles to get stronger. Doing a few simple activities that make your muscles work against, or \"resist,\" something can help you get stronger. For example, you can: · Do push-ups or sit-ups, which use your own body weight as resistance. · Lift weights or dumbbells or use stretch bands at home or in a gym or community center. Stretch your muscles often. Stretching will help you as you become more active. It can help you stay flexible, loosen tight muscles, and avoid injury. It can also help improve your balance and posture and can be a great way to relax. Be sure to stretch the muscles you'll be using when you work out. It's best to warm your muscles slightly before you stretch them. Walk or do some other light aerobic activity for a few minutes, and then start stretching. When you stretch your muscles: · Do it slowly. Stretching is not about going fast or making sudden movements. · Don't push or bounce during a stretch. · Hold each stretch for at least 15 to 30 seconds, if you can. You should feel a stretch in the muscle, but not pain. · Breathe out as you do the stretch. Then breathe in as you hold the stretch. Don't hold your breath. If you're worried about how more activity might affect your health, have a checkup before you start.  Follow any special advice your doctor gives you for getting a smart start. Where can you learn more? Go to http://www.gray.com/ Enter B351 in the search box to learn more about \"Learning About Being Physically Active. \" Current as of: September 10, 2020               Content Version: 12.8 © 0821-9147 [employer]. Care instructions adapted under license by [employer] (which disclaims liability or warranty for this information). If you have questions about a medical condition or this instruction, always ask your healthcare professional. [address] any warranty or liability for your use of this information.

## 2021-05-04 NOTE — PROGRESS NOTES
HISTORY OF PRESENT ILLNESS  Lamont Head is a 80 y.o. male presents for   Chief Complaint   Patient presents with    Follow Up Chronic Condition     Pt states he has been feeling very tired recently and he is not sure why. Pt is asking for a refill on his gabapentin. pt states his fingers are tingling pt was sure if he needed refills on his other meds. he said his daughter taks care of it      Fatigue for months (3-4-5 or so months) cannot figure out why. . has been on beta blockers for years without difficulty    Complains of neuropathy due to diabetes. 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    Renal insufficiency N28.9    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[address] D69.6    Type II diabetes mellitus ([facility] [facility] E11.9     Patient Active Problem List    Diagnosis Date Noted    Hyperlipidemia 12/08/2020    Hypertensive disorder 12/08/2020    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  Max Daily Amount: 300 mg. 90 Cap 1    indapamide (LOZOL) 1.25 mg tablet indapamide 1.25 mg tablet  TK 1 T PO QAM 90 Tab 1    insulin glargine (Lantus Solostar U-100 Insulin) 100 unit/mL (3 mL) inpn 10 units every morning 3 Pen 1    Insulin Needles, Disposable, 32 gauge x 5/32\" ndle Use UTD  E11.40: Type 2 diabetes mellitus with diabetic neuropathy, unspecified 100 Pen Needle 4    Januvia 50 mg tablet Take 1 Tab by mouth two (2) times a day. 180 Tab 1    lancets 28 gauge misc 28 Lancet by Does Not Apply route two (2) times a day. Use as Directed; E11.40: Type 2 diabetes mellitus with diabetic neuropathy, unspecified 100 Lancet 4    metoprolol succinate (TOPROL-XL) 50 mg XL tablet metoprolol succinate ER 50 mg tablet,extended release 24 hr  TK 1 T PO QD 90 Tab 1    simvastatin (ZOCOR) 20 mg tablet simvastatin 20 mg tablet  TAKE 1 TABLET DAILY IN THE EVENING 90 Tab 1     Allergies   Allergen Reactions    Penicillins Unknown (comments)     Past Medical History:   Diagnosis Date    Diabetes ([address]     Hypercholesterolemia     Hypertension      History reviewed. No pertinent surgical history. Family History   Problem Relation Age of Onset    Diabetes Other     Hypertension Other      Social History     Tobacco Use    Smoking status: Never Smoker    Smokeless tobacco: Never Used   Substance Use Topics    Alcohol use: Not Currently           Review of Systems   Constitutional: Positive for malaise/fatigue. Negative for chills, fever and weight loss. Cardiovascular: Negative for chest pain and palpitations. Neurological: Positive for tingling and sensory change. Negative for dizziness. Psychiatric/Behavioral: Negative for depression. Physical Exam  Vitals signs reviewed. Constitutional:       Appearance: Normal appearance. He is normal weight. HENT:      Head: Normocephalic. Nose: Nose normal.      Mouth/Throat:      Mouth: Mucous membranes are moist.   Eyes:      Extraocular Movements: Extraocular movements intact. Pupils: Pupils are equal, round, and reactive to light. Neck:      Musculoskeletal: Normal range of motion and neck supple. Cardiovascular:      Rate and Rhythm: Normal rate and regular rhythm. Pulses: Normal pulses.       Heart sounds: Normal heart sounds. Comments: Distant heart sounds  Pulmonary:      Effort: Pulmonary effort is normal.      Breath sounds: Normal breath sounds. Musculoskeletal: Normal range of motion. Skin:     General: Skin is warm and dry. Neurological:      General: No focal deficit present. Mental Status: He is alert and oriented to person, place, and time. Psychiatric:         Attention and Perception: Attention and perception normal.         Mood and Affect: Affect normal.         Speech: Speech normal.         Behavior: Behavior normal. Behavior is cooperative. Thought Content: Thought content normal.         Cognition and Memory: Cognition and memory normal.         Judgment: Judgment normal.           ASSESSMENT and PLAN  Diagnoses and all orders for this visit:    1. Fatigue, unspecified type  Comments:  labs primarily for fatigue. likely is renal studies are still abnormal we will send to nephrology; sees cards ? beta blockers? Orders:  -     CBC WITH AUTOMATED DIFF  -     MAGNESIUM  -     METABOLIC PANEL, COMPREHENSIVE  -     TSH RFX ON ABNORMAL TO FREE T4    2. Type 2 diabetes mellitus with diabetic neuropathy, unspecified whether long term insulin use (HCC)  Comments:  worried about neuopathy will reoder his gabapentin  Orders:  -     gabapentin (NEURONTIN) 100 mg capsule; gabapentin 100 mg capsule  TK 1 C PO TID  -     gabapentin (NEURONTIN) 300 mg capsule; Take 1 Cap by mouth daily. Max Daily Amount: 300 mg.          Jorge Luis Watson NP

## 2021-05-04 NOTE — PROGRESS NOTES
1. Have you been to the ER, urgent care clinic since your last visit? Hospitalized since your last visit? no    2. Have you seen or consulted any other health care providers outside of the [address] [place] [address] since your last visit? Include any pap smears or colon screening. No  Visit Vitals  /68 (BP 1 Location: Right arm, BP Patient Position: At rest, BP Cuff Size: Adult)   Pulse 63   Temp 97.3 °F (36.3 °C) (Temporal)   Resp 18   Ht 5' 7\" (1.702 m)   Wt 185 lb (83.9 kg)   SpO2 97%   BMI 28.98 kg/m²     . Chief Complaint   Patient presents with    Follow Up Chronic Condition     Pt states he has been feeling very tired recently and he is not sure why. Pt is asking for a refill on his gabapentin. pt states his fingers are tingling pt was sure if he needed refills on his other meds.  he said his daughter taks care of it

## 2021-05-05 LAB
ALBUMIN SERPL-MCNC: 3.8 G/DL (ref 3.5–4.6)
ALBUMIN/GLOB SERPL: 1.4 {RATIO} (ref 1.2–2.2)
ALP SERPL-CCNC: 76 IU/L (ref 39–117)
ALT SERPL-CCNC: 10 IU/L (ref 0–44)
AST SERPL-CCNC: 18 IU/L (ref 0–40)
BASOPHILS # BLD AUTO: 0 X10E3/UL (ref 0–0.2)
BASOPHILS NFR BLD AUTO: 1 %
BILIRUB SERPL-MCNC: 0.4 MG/DL (ref 0–1.2)
BUN SERPL-MCNC: 21 MG/DL (ref 10–36)
BUN/CREAT SERPL: 18 (ref 10–24)
CALCIUM SERPL-MCNC: 8.8 MG/DL (ref 8.6–10.2)
CHLORIDE SERPL-SCNC: 98 MMOL/L (ref 96–106)
CO2 SERPL-SCNC: 27 MMOL/L (ref 20–29)
CREAT SERPL-MCNC: 1.2 MG/DL (ref 0.76–1.27)
EOSINOPHIL # BLD AUTO: 0.3 X10E3/UL (ref 0–0.4)
EOSINOPHIL NFR BLD AUTO: 4 %
ERYTHROCYTE [DISTWIDTH] IN BLOOD BY AUTOMATED COUNT: 13 % (ref 11.6–15.4)
GLOBULIN SER CALC-MCNC: 2.8 G/DL (ref 1.5–4.5)
GLUCOSE SERPL-MCNC: 201 MG/DL (ref 65–99)
HCT VFR BLD AUTO: 40.7 % (ref 37.5–51)
HGB BLD-MCNC: 13.4 G/DL (ref 13–17.7)
IMM GRANULOCYTES # BLD AUTO: 0 X10E3/UL (ref 0–0.1)
IMM GRANULOCYTES NFR BLD AUTO: 1 %
LYMPHOCYTES # BLD AUTO: 2 X10E3/UL (ref 0.7–3.1)
LYMPHOCYTES NFR BLD AUTO: 27 %
MAGNESIUM SERPL-MCNC: 2.2 MG/DL (ref 1.6–2.3)
MCH RBC QN AUTO: 29.1 PG (ref 26.6–33)
MCHC RBC AUTO-ENTMCNC: 32.9 G/DL (ref 31.5–35.7)
MCV RBC AUTO: 88 FL (ref 79–97)
MONOCYTES # BLD AUTO: 0.7 X10E3/UL (ref 0.1–0.9)
MONOCYTES NFR BLD AUTO: 9 %
NEUTROPHILS # BLD AUTO: 4.5 X10E3/UL (ref 1.4–7)
NEUTROPHILS NFR BLD AUTO: 58 %
PLATELET # BLD AUTO: 159 X10E3/UL (ref 150–450)
POTASSIUM SERPL-SCNC: 4.3 MMOL/L (ref 3.5–5.2)
PROT SERPL-MCNC: 6.6 G/DL (ref 6–8.5)
RBC # BLD AUTO: 4.61 X10E6/UL (ref 4.14–5.8)
SODIUM SERPL-SCNC: 139 MMOL/L (ref 134–144)
TSH SERPL DL<=0.005 MIU/L-ACNC: 2.99 UIU/ML (ref 0.45–4.5)
WBC # BLD AUTO: 7.6 X10E3/UL (ref 3.4–10.8)

## 2021-05-05 NOTE — PROGRESS NOTES
Armani Show labs are generally no reason for his fatigue. Kidneys look better than usual maybe his sugar is a little higher than id like to see but generally pretty good. . no anemia lets try the vitamins we discussed and see how it goes maybe b12 shots are in your future.

## 2021-05-05 NOTE — PROGRESS NOTES
I called pt but he said he really couldn't hear me so he will get his daughter to call me back about his labs.  ELYSE

## 2021-08-18 ENCOUNTER — OFFICE VISIT (OUTPATIENT)
Dept: PRIMARY CARE CLINIC | Age: 86
End: 2021-08-18
Payer: MEDICARE

## 2021-08-18 VITALS
OXYGEN SATURATION: 97 % | WEIGHT: 188 LBS | BODY MASS INDEX: 29.51 KG/M2 | HEART RATE: 72 BPM | SYSTOLIC BLOOD PRESSURE: 117 MMHG | DIASTOLIC BLOOD PRESSURE: 66 MMHG | RESPIRATION RATE: 16 BRPM | HEIGHT: 67 IN | TEMPERATURE: 97.3 F

## 2021-08-18 DIAGNOSIS — E11.40 TYPE 2 DIABETES MELLITUS WITH DIABETIC NEUROPATHY, UNSPECIFIED WHETHER LONG TERM INSULIN USE (HCC): Chronic | ICD-10-CM

## 2021-08-18 DIAGNOSIS — K59.00 CONSTIPATION, UNSPECIFIED CONSTIPATION TYPE: Primary | ICD-10-CM

## 2021-08-18 PROCEDURE — G8419 CALC BMI OUT NRM PARAM NOF/U: HCPCS | Performed by: NURSE PRACTITIONER

## 2021-08-18 PROCEDURE — G8536 NO DOC ELDER MAL SCRN: HCPCS | Performed by: NURSE PRACTITIONER

## 2021-08-18 PROCEDURE — G8432 DEP SCR NOT DOC, RNG: HCPCS | Performed by: NURSE PRACTITIONER

## 2021-08-18 PROCEDURE — 3051F HG A1C>EQUAL 7.0%<8.0%: CPT | Performed by: NURSE PRACTITIONER

## 2021-08-18 PROCEDURE — 1101F PT FALLS ASSESS-DOCD LE1/YR: CPT | Performed by: NURSE PRACTITIONER

## 2021-08-18 PROCEDURE — G8427 DOCREV CUR MEDS BY ELIG CLIN: HCPCS | Performed by: NURSE PRACTITIONER

## 2021-08-18 PROCEDURE — 99214 OFFICE O/P EST MOD 30 MIN: CPT | Performed by: NURSE PRACTITIONER

## 2021-08-18 RX ORDER — GABAPENTIN 300 MG/1
600 CAPSULE ORAL
Qty: 180 CAPSULE | Refills: 1 | Status: SHIPPED | OUTPATIENT
Start: 2021-08-18 | End: 2021-11-30 | Stop reason: SDUPTHER

## 2021-08-18 RX ORDER — SENNOSIDES 8.6 MG/1
1 TABLET ORAL
Qty: 60 TABLET | Refills: 1 | Status: SHIPPED | OUTPATIENT
Start: 2021-08-18 | End: 2022-07-27 | Stop reason: ALTCHOICE

## 2021-08-18 NOTE — PROGRESS NOTES
1. Have you been to the ER, urgent care clinic since your last visit? Hospitalized since your last visit? No    2. Have you seen or consulted any other health care providers outside of the [address] [place] [address] since your last visit? Include any pap smears or colon screening.  No   Visit Vitals  /66 (BP 1 Location: Right arm, BP Patient Position: Sitting)   Pulse 72   Temp 97.3 °F (36.3 °C) (Axillary)   Resp 16   Ht 5' 7\" (1.702 m)   Wt 188 lb (85.3 kg)   SpO2 97%   BMI 29.44 kg/m²     Chief Complaint   Patient presents with    Foot Pain    Urinary Frequency

## 2021-08-18 NOTE — PROGRESS NOTES
HISTORY OF PRESENT ILLNESS  Evie Padilla is a 80 y.o. male presents for   Chief Complaint   Patient presents with    Foot Pain    Urinary Frequency     Slowly increasing bilateral medial bottom of foot pain / \"tingling\"  Used to happen got better with gabapentin but has gotten worse. .       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    Renal insufficiency N28.9    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[facility] 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 Max Daily Amount: 600 mg. 180 Capsule 1    senna (Senna) 8.6 mg tablet Take 1 Tablet by mouth nightly.  60 Tablet 1    gabapentin (NEURONTIN) 100 mg capsule gabapentin 100 mg capsule  TK 1 C PO  Cap 1    indapamide (LOZOL) 1.25 mg tablet indapamide 1.25 mg tablet  TK 1 T PO QAM 90 Tab 1    insulin glargine (Lantus Solostar U-100 Insulin) 100 unit/mL (3 mL) inpn 10 units every morning 3 Pen 1    Insulin Needles, Disposable, 32 gauge x 5/32\" ndle Use UTD  E11.40: Type 2 diabetes mellitus with diabetic neuropathy, unspecified 100 Pen Needle 4    Januvia 50 mg tablet Take 1 Tab by mouth two (2) times a day. 180 Tab 1    lancets 28 gauge misc 28 Lancet by Does Not Apply route two (2) times a day. Use as Directed; E11.40: Type 2 diabetes mellitus with diabetic neuropathy, unspecified 100 Lancet 4    metoprolol succinate (TOPROL-XL) 50 mg XL tablet metoprolol succinate ER 50 mg tablet,extended release 24 hr  TK 1 T PO QD 90 Tab 1    simvastatin (ZOCOR) 20 mg tablet simvastatin 20 mg tablet  TAKE 1 TABLET DAILY IN THE EVENING 90 Tab 1     Allergies   Allergen Reactions    Penicillins Unknown (comments)     Past Medical History:   Diagnosis Date    [facility]     Hypercholesterolemia     Hypertension      No past surgical history on file. Family History   Problem Relation Age of Onset    Diabetes Other     Hypertension Other      Social History     Tobacco Use    Smoking status: Never Smoker    Smokeless tobacco: Never Used   Substance Use Topics    Alcohol use: Not Currently           Review of Systems   Constitutional: Negative for fever and weight loss. HENT: Negative for sore throat and tinnitus. Eyes: Negative for blurred vision and double vision. Respiratory: Negative for shortness of breath. Cardiovascular: Negative for chest pain, palpitations and leg swelling. Gastrointestinal: Positive for constipation. Negative for diarrhea. Skin: Negative for itching and rash. Neurological: Positive for tingling and sensory change. Negative for dizziness. Endo/Heme/Allergies: Bruises/bleeds easily. Psychiatric/Behavioral: Negative for depression. The patient is not nervous/anxious and does not have insomnia. Physical Exam  Vitals reviewed. Constitutional:       Appearance: Normal appearance. He is normal weight. HENT:      Head: Normocephalic.       Nose: Nose normal.      Mouth/Throat:      Mouth: Mucous membranes are moist.   Eyes:      Extraocular Movements: Extraocular movements intact. Pupils: Pupils are equal, round, and reactive to light. Cardiovascular:      Rate and Rhythm: Normal rate and regular rhythm. Pulses: Normal pulses. Heart sounds: Normal heart sounds. Pulmonary:      Effort: Pulmonary effort is normal.      Breath sounds: Normal breath sounds. Musculoskeletal:         General: Normal range of motion. Cervical back: Normal range of motion and neck supple. Feet:    Feet:      Comments: Sensation changes at this location and where he feels pain/tingling   Skin:     General: Skin is warm and dry. Neurological:      General: No focal deficit present. Mental Status: He is alert and oriented to person, place, and time. Psychiatric:         Attention and Perception: Attention and perception normal.         Mood and Affect: Affect normal.         Speech: Speech normal.         Behavior: Behavior normal. Behavior is cooperative. Thought Content: Thought content normal.         Cognition and Memory: Cognition and memory normal.         Judgment: Judgment normal.           ASSESSMENT and PLAN  Diagnoses and all orders for this visit:    1. Constipation, unspecified constipation type  -     senna (Senna) 8.6 mg tablet; Take 1 Tablet by mouth nightly. 2. Type 2 diabetes mellitus with diabetic neuropathy, unspecified whether long term insulin use ([facility]  Comments:  worried about neuopathy will reoder his gabapentin  Orders:  -     gabapentin (NEURONTIN) 300 mg capsule; Take 2 Capsules by mouth nightly. Max Daily Amount: 600 mg. Anna Peter NP         This office note has been dictated using voice capture. Despite evaluating for errors, syntax errors could be present.

## 2021-08-29 DIAGNOSIS — I10 ESSENTIAL HYPERTENSION: ICD-10-CM

## 2021-08-30 RX ORDER — INDAPAMIDE 1.25 MG/1
TABLET, FILM COATED ORAL
Qty: 90 TABLET | Refills: 3 | Status: SHIPPED | OUTPATIENT
Start: 2021-08-30 | End: 2021-11-30 | Stop reason: SDUPTHER

## 2021-09-27 ENCOUNTER — OFFICE VISIT (OUTPATIENT)
Dept: PRIMARY CARE CLINIC | Age: 86
End: 2021-09-27
Payer: MEDICARE

## 2021-09-27 VITALS
HEART RATE: 73 BPM | HEIGHT: 67 IN | SYSTOLIC BLOOD PRESSURE: 123 MMHG | RESPIRATION RATE: 18 BRPM | OXYGEN SATURATION: 99 % | TEMPERATURE: 97.1 F | BODY MASS INDEX: 29.35 KG/M2 | WEIGHT: 187 LBS | DIASTOLIC BLOOD PRESSURE: 74 MMHG
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
## 2021-09-27 DIAGNOSIS — K59.09 OTHER CONSTIPATION: Primary | ICD-10-CM

## 2021-09-27 PROBLEM — D69.6 THROMBOCYTOPENIC DISORDER (HCC): Status: RESOLVED | Noted: 2020-12-08 | Resolved: 2021-09-27

## 2021-09-27 PROCEDURE — G8427 DOCREV CUR MEDS BY ELIG CLIN: HCPCS | Performed by: NURSE PRACTITIONER

## 2021-09-27 PROCEDURE — G8419 CALC BMI OUT NRM PARAM NOF/U: HCPCS | Performed by: NURSE PRACTITIONER

## 2021-09-27 PROCEDURE — G8536 NO DOC ELDER MAL SCRN: HCPCS | Performed by: NURSE PRACTITIONER

## 2021-09-27 PROCEDURE — 99213 OFFICE O/P EST LOW 20 MIN: CPT | Performed by: NURSE PRACTITIONER

## 2021-09-27 PROCEDURE — G8432 DEP SCR NOT DOC, RNG: HCPCS | Performed by: NURSE PRACTITIONER

## 2021-09-27 PROCEDURE — 1101F PT FALLS ASSESS-DOCD LE1/YR: CPT | Performed by: NURSE PRACTITIONER

## 2021-09-27 RX ORDER — DOCUSATE SODIUM 100 MG/1
100 CAPSULE, LIQUID FILLED ORAL 2 TIMES DAILY
Qty: 60 CAPSULE | Refills: 2 | Status: SHIPPED | OUTPATIENT
Start: 2021-09-27 | End: 2021-12-26

## 2021-09-27 RX ORDER — MUPIROCIN 20 MG/G
OINTMENT TOPICAL DAILY
Qty: 22 G | Refills: 0 | Status: SHIPPED | OUTPATIENT
Start: 2021-09-27

## 2021-09-27 RX ORDER — TRIAMCINOLONE ACETONIDE 1 MG/G
OINTMENT TOPICAL 2 TIMES DAILY
Qty: 30 G | Refills: 0 | Status: SHIPPED | OUTPATIENT
Start: 2021-09-27

## 2021-09-27 RX ORDER — DOCUSATE SODIUM 100 MG/1
100 CAPSULE, LIQUID FILLED ORAL 2 TIMES DAILY
Qty: 60 CAPSULE | Refills: 2 | Status: SHIPPED | OUTPATIENT
Start: 2021-09-27 | End: 2021-09-27 | Stop reason: ALTCHOICE

## 2021-09-27 NOTE — PROGRESS NOTES
Chief Complaint   Patient presents with    Rash     Pt states year ago he got bite by a tick on the behind. Pt states area is is painfull and this has been going on for a copule years. Pt states the last 6 months it has gotten very sore      1. Have you been to the ER, urgent care clinic since your last visit? Hospitalized since your last visit?no    2. Have you seen or consulted any other health care providers outside of the [address] [place] [address] since your last visit? Include any pap smears or colon screening.  No  Visit Vitals  /74 (BP 1 Location: Right arm, BP Patient Position: At rest, BP Cuff Size: Adult)   Pulse 73   Temp 97.1 °F (36.2 °C) (Temporal)   Resp 18   Ht 5' 7\" (1.702 m)   Wt 187 lb (84.8 kg)   SpO2 99%   BMI 29.29 kg/m² Daily Note     Today's date: 2021  Patient name: Flores Blankenship  : 1961  MRN: [id number]  Referring provider: Dipika Gtz  Dx:   Encounter Diagnosis     ICD-10-CM    1  Primary osteoarthritis of right knee  M17 11    2  Chronic pain of right knee  M25 561     G89 29    3  Status post total right knee replacement  [id number]        Start Time: 0845  Stop Time: 09  Total time in clinic (min): 65 minutes    Subjective: Patient's daughter states she was not with her over the weekend to help her do the exercises  Patient with no new complaints but asks "why can't I lift my leg, is it because I can't bend it?"      Objective: See treatment diary below      Assessment: Tolerated treatment fair  Patient achieving approximately 60* AAROM and 65* with passive overpressure from therapist seated and supine  However, patient continues to compensate with right hip hiking throughout session requiring maximal verbal and tactile cues for relaxation  Trialed SAQ with patient unable to achieve activation through range of motion but does demonstrate improved quadriceps engagement compared to quad set in extension  Educated patient and daughter on trialing quad sets with cues to "kick" the leg for improved activation with verbalized understanding  Evident compensation with standing marches with hip hiking and trunk flexion with patient avoiding use of hip flexor and knee flexion  Patient demonstrated fatigue post treatment, exhibited good technique with therapeutic exercises and would benefit from continued PT  Plan: Continue per plan of care        Precautions: see protocol   * Indicates part of HEP     Daily Treatment Diary     Manuals 3/15 3/30 4/8 4/12 4/14 4/16 4/19      PROM right knee   15' 15' Seated  &  Supine Seated & supine Seatedand supine      Supine extension overpressure    Done Done 25' total Done 25' total Done 20'      Neuro Re-ed             Quad sets* :05x20 8 poor activation 2x10 TCs 2x10  2x10       SAQ       Assist 1/2 range 10x3"      Heel slides w strap* :05x10 5 PT assist  7 PT assist 10 seated 20   Supine 10  Seated 10      Seated heel slides w overpressure             Gastroc stretch strap  :10x10 :10x10  :10x10  :10x10 :10x10      Hamstring stretch     :10x10 :10x10 :10x10                   Tandem stance (progress to foam)             SLS (progress to foam)             SLS abduction                          Therapeutic Exercise             Bike             Patient education  8' 10' 8' 8' 8'       SLR flexion w/ quad set* 10            SLR hip abduction              Bridging*             Clamshells             Heel raises       nv                   LAQ* 10            Standing marching      2x10  3x10 form      Therapeutic Activity             Leg press              Step ups             Lateral step ups             STS                          Gait training w RW   5'  5' TCs         Standing march to TKE    5' TCs         Modalities             CP PRN

## 2021-09-27 NOTE — PROGRESS NOTES
HISTORY OF PRESENT ILLNESS  Jay May is a 80 y.o. male presents for rash. Patient reported that he was bit by a tick or bug to his buttocks over 2 years ago. He has been having issues with soreness and pain to that area over the years. Was given Mupirocin in the past which he has been using to this area. Patient is concerned about infection as it has started to become more painful over the past 6 months. Patient has been using the mupirocin nightly. Patient reported that he has has constipation and will use laxative every other day to have a BM. Patient reported that he then has to use the restroom 4-5 times a day after taking the laxative. Patient reported he wants something to help with this. 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 ([facility] E11.43    Renal insufficiency N28.9    Type [facility] mellitus ([facility] E11.9     Patient Active Problem List    Diagnosis Date Noted    Hyperlipidemia 12/08/2020    Hypertensive disorder 12/08/2020    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 60 Capsule 2    triamcinolone acetonide (KENALOG) 0.1 % ointment Apply  to affected area two (2) times a day. use thin layer 30 g 0    mupirocin (BACTROBAN) 2 % ointment Apply  to affected area daily. 22 g 0    indapamide (LOZOL) 1.25 mg tablet TAKE 1 TABLET EVERY MORNING 90 Tablet 3    gabapentin (NEURONTIN) 300 mg capsule Take 2 Capsules by mouth nightly.  Max Daily Amount: 600 mg. 180 Capsule 1    senna (Senna) 8.6 mg tablet Take 1 Tablet by mouth nightly. 60 Tablet 1    gabapentin (NEURONTIN) 100 mg capsule gabapentin 100 mg capsule  TK 1 C PO  Cap 1    insulin glargine (Lantus Solostar U-100 Insulin) 100 unit/mL (3 mL) inpn 10 units every morning 3 Pen 1    Insulin Needles, Disposable, 32 gauge x 5/32\" ndle Use UTD  E11.40: Type 2 diabetes mellitus with diabetic neuropathy, unspecified 100 Pen Needle 4    Januvia 50 mg tablet Take 1 Tab by mouth two (2) times a day. 180 Tab 1    lancets 28 gauge misc 28 Lancet by Does Not Apply route two (2) times a day. Use as Directed; E11.40: Type 2 diabetes mellitus with diabetic neuropathy, unspecified 100 Lancet 4    metoprolol succinate (TOPROL-XL) 50 mg XL tablet metoprolol succinate ER 50 mg tablet,extended release 24 hr  TK 1 T PO QD 90 Tab 1    simvastatin (ZOCOR) 20 mg tablet simvastatin 20 mg tablet  TAKE 1 TABLET DAILY IN THE EVENING 90 Tab 1     Allergies   Allergen Reactions    Penicillins Unknown (comments)     Past Medical History:   Diagnosis Date    Diabetes ([facility]     Hypercholesterolemia     Hypertension      History reviewed. No pertinent surgical history. Family History   Problem Relation Age of Onset    Diabetes Other     Hypertension Other      Social History     Tobacco Use    Smoking status: Never Smoker    Smokeless tobacco: Never Used   Substance Use Topics    Alcohol use: Not Currently           Review of Systems   Constitutional: Negative for chills and fever. Musculoskeletal: Negative for joint pain and myalgias. Skin: Positive for rash. Negative for itching. Physical Exam  Constitutional:       Appearance: Normal appearance. Skin:     General: Skin is warm and dry. Findings: Lesion (small scabbed over area to left buttocks. No induration.) present. Neurological:      Mental Status: He is alert and oriented to person, place, and time.    Psychiatric:         Mood and Affect: Mood normal.         Behavior: Behavior normal.           ASSESSMENT and PLAN  Diagnoses and all orders for this visit:    1. Other constipation  Comments:  discussed stopping laxative as this is what is causing 4-5 BM's the next day. Switch to stool softener and increase water intake. Orders:  -     docusate sodium (COLACE) 100 mg capsule; Take 1 Capsule by mouth two (2) times a day for 90 days. 2. Bug bite, initial encounter  Comments:  use steroid cream to see if it will help with inflammation/healing to area. no signs of infection. Orders:  -     triamcinolone acetonide (KENALOG) 0.1 % ointment; Apply  to affected area two (2) times a day. use thin layer  -     mupirocin (BACTROBAN) 2 % ointment; Apply  to affected area daily.          Carlos Tyler NP

## 2021-10-11 DIAGNOSIS — E78.2 MIXED HYPERLIPIDEMIA: ICD-10-CM

## 2021-10-11 RX ORDER — SIMVASTATIN 20 MG/1
TABLET, FILM COATED ORAL
Qty: 90 TABLET | Refills: 1 | Status: SHIPPED | OUTPATIENT
Start: 2021-10-11 | End: 2021-11-30 | Stop reason: SDUPTHER

## 2021-10-18 RX ORDER — BLOOD-GLUCOSE METER
KIT MISCELLANEOUS
COMMUNITY
End: 2021-10-18 | Stop reason: SDUPTHER

## 2021-10-18 RX ORDER — BLOOD-GLUCOSE METER
KIT MISCELLANEOUS
Qty: 100 STRIP | Refills: 3 | Status: SHIPPED | OUTPATIENT
Start: 2021-10-18 | End: 2022-01-10 | Stop reason: SDUPTHER

## 2021-11-23 ENCOUNTER — TELEPHONE (OUTPATIENT)
Dept: PRIMARY CARE CLINIC | Age: 86
End: 2021-11-23

## 2021-11-24 DIAGNOSIS — I10 ESSENTIAL HYPERTENSION: ICD-10-CM

## 2021-11-24 DIAGNOSIS — E11.40 TYPE 2 DIABETES MELLITUS WITH DIABETIC NEUROPATHY, UNSPECIFIED WHETHER LONG TERM INSULIN USE (HCC): ICD-10-CM

## 2021-11-24 DIAGNOSIS — E78.2 MIXED HYPERLIPIDEMIA: ICD-10-CM

## 2021-11-24 RX ORDER — METOPROLOL SUCCINATE 50 MG/1
TABLET, EXTENDED RELEASE ORAL
Qty: 90 TABLET | Refills: 0 | Status: SHIPPED | OUTPATIENT
Start: 2021-11-24 | End: 2021-11-30 | Stop reason: SDUPTHER

## 2021-11-24 RX ORDER — SIMVASTATIN 20 MG/1
TABLET, FILM COATED ORAL
Qty: 90 TABLET | Refills: 1 | OUTPATIENT
Start: 2021-11-24

## 2021-11-30 ENCOUNTER — OFFICE VISIT (OUTPATIENT)
Dept: PRIMARY CARE CLINIC | Age: 86
End: 2021-11-30
Payer: MEDICARE

## 2021-11-30 VITALS
WEIGHT: 186 LBS | SYSTOLIC BLOOD PRESSURE: 132 MMHG | OXYGEN SATURATION: 98 % | BODY MASS INDEX: 29.19 KG/M2 | TEMPERATURE: 96.8 F | RESPIRATION RATE: 18 BRPM | DIASTOLIC BLOOD PRESSURE: 78 MMHG | HEIGHT: 67 IN | HEART RATE: 68 BPM

## 2021-11-30 DIAGNOSIS — I10 ESSENTIAL HYPERTENSION: ICD-10-CM

## 2021-11-30 DIAGNOSIS — E78.2 MIXED HYPERLIPIDEMIA: ICD-10-CM

## 2021-11-30 DIAGNOSIS — E11.40 TYPE 2 DIABETES MELLITUS WITH DIABETIC NEUROPATHY, UNSPECIFIED WHETHER LONG TERM INSULIN USE (HCC): ICD-10-CM

## 2021-11-30 PROCEDURE — 3051F HG A1C>EQUAL 7.0%<8.0%: CPT | Performed by: NURSE PRACTITIONER

## 2021-11-30 PROCEDURE — G8427 DOCREV CUR MEDS BY ELIG CLIN: HCPCS | Performed by: NURSE PRACTITIONER

## 2021-11-30 PROCEDURE — 99214 OFFICE O/P EST MOD 30 MIN: CPT | Performed by: NURSE PRACTITIONER

## 2021-11-30 PROCEDURE — G8536 NO DOC ELDER MAL SCRN: HCPCS | Performed by: NURSE PRACTITIONER

## 2021-11-30 PROCEDURE — 1101F PT FALLS ASSESS-DOCD LE1/YR: CPT | Performed by: NURSE PRACTITIONER

## 2021-11-30 PROCEDURE — G8432 DEP SCR NOT DOC, RNG: HCPCS | Performed by: NURSE PRACTITIONER

## 2021-11-30 PROCEDURE — G8419 CALC BMI OUT NRM PARAM NOF/U: HCPCS | Performed by: NURSE PRACTITIONER

## 2021-11-30 RX ORDER — SIMVASTATIN 20 MG/1
TABLET, FILM COATED ORAL
Qty: 90 TABLET | Refills: 1 | Status: SHIPPED | OUTPATIENT
Start: 2021-11-30 | End: 2022-04-27 | Stop reason: SDUPTHER

## 2021-11-30 RX ORDER — GABAPENTIN 300 MG/1
600 CAPSULE ORAL
Qty: 180 CAPSULE | Refills: 1 | Status: SHIPPED | OUTPATIENT
Start: 2021-11-30 | End: 2022-04-27 | Stop reason: SDUPTHER

## 2021-11-30 RX ORDER — PEN NEEDLE, DIABETIC 31 GX3/16"
NEEDLE, DISPOSABLE MISCELLANEOUS
Qty: 100 PEN NEEDLE | Refills: 4 | Status: SHIPPED | OUTPATIENT
Start: 2021-11-30

## 2021-11-30 RX ORDER — APIXABAN 5 MG/1
TABLET, FILM COATED ORAL
COMMUNITY
Start: 2021-11-23

## 2021-11-30 RX ORDER — INSULIN GLARGINE 100 [IU]/ML
INJECTION, SOLUTION SUBCUTANEOUS
Qty: 3 PEN | Refills: 1 | Status: SHIPPED | OUTPATIENT
Start: 2021-11-30 | End: 2022-04-27 | Stop reason: SDUPTHER

## 2021-11-30 RX ORDER — METOPROLOL SUCCINATE 50 MG/1
TABLET, EXTENDED RELEASE ORAL
Qty: 90 TABLET | Refills: 1 | Status: SHIPPED | OUTPATIENT
Start: 2021-11-30 | End: 2022-04-27 | Stop reason: SDUPTHER

## 2021-11-30 RX ORDER — INDAPAMIDE 1.25 MG/1
TABLET, FILM COATED ORAL
Qty: 90 TABLET | Refills: 1 | Status: SHIPPED | OUTPATIENT
Start: 2021-11-30 | End: 2022-04-27 | Stop reason: SDUPTHER

## 2021-11-30 NOTE — PROGRESS NOTES
HISTORY OF PRESENT ILLNESS  Dwight Shelton is a 80 y.o. male presents for medication refill. Diabetes: Last A1c was   Lab Results   Component Value Date/Time    Hemoglobin A1c 7.4 (H) 02/08/2021 03:57 PM    . Diabetes well controlled on januvia and lantus. Patient's last eye exam was . Admits neuropathy, takes gabapentin for this. Hypertension: Patients hypertension is well controlled on regimen of metoprolol, indapamide. . Denies headaches, blurred vision or dizziness. Hyperlipidemia: Mixed hyperlipidemia well controlled on simvastatin. Denies any leg cramps or malaise from this medication. Reported compliance with taking medication daily. 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 ([facility] 12/08/2020    Renal insufficiency 12/08/2020    Type II diabetes mellitus ([facility] 12/08/2020     Current Outpatient Medications   Medication Sig Dispense Refill    Eliquis 5 mg tablet       metoprolol succinate (TOPROL-XL) 50 mg XL tablet metoprolol succinate ER 50 mg tablet,extended release 24 hr  TK 1 T PO QD 90 Tablet 1    indapamide (LOZOL) 1.25 mg tablet TAKE 1 TABLET EVERY MORNING 90 Tablet 1    simvastatin (ZOCOR) 20 mg tablet simvastatin 20 mg tablet  TAKE 1 TABLET DAILY IN THE EVENING 90 Tablet 1    SITagliptin (Januvia) 50 mg tablet Take 1 Tablet by mouth two (2) times a day. 180 Tablet 1    gabapentin (NEURONTIN) 300 mg capsule Take 2 Capsules by mouth nightly.  Max Daily Amount: 600 mg. 180 Capsule 1    insulin glargine (Lantus Solostar U-100 Insulin) 100 unit/mL (3 mL) inpn 10 units every morning 3 Pen 1    Insulin Needles, Disposable, 32 gauge x 5/32\" ndle Use UTD  E11.40: Type 2 diabetes mellitus with diabetic neuropathy, unspecified 100 Pen Needle 4    glucose blood VI test strips (FreeStyle Lite Strips) strip FreeStyle Lite Strips  USE AS DIRECTED 2 TIMES DAILY 100 Strip 3    docusate sodium (COLACE) 100 mg capsule Take 1 Capsule by mouth two (2) times a day for 90 days. 60 Capsule 2    triamcinolone acetonide (KENALOG) 0.1 % ointment Apply  to affected area two (2) times a day. use thin layer 30 g 0    mupirocin (BACTROBAN) 2 % ointment Apply  to affected area daily. 22 g 0    senna (Senna) 8.6 mg tablet Take 1 Tablet by mouth nightly. 60 Tablet 1    lancets 28 gauge misc 28 Lancet by Does Not Apply route two (2) times a day. Use as Directed; E11.40: Type 2 diabetes mellitus with diabetic neuropathy, unspecified 100 Lancet 4     Allergies   Allergen Reactions    Penicillins Unknown (comments)     Past Medical History:   Diagnosis Date    [facility] ([facility]     Hypercholesterolemia     Hypertension      History reviewed. No pertinent surgical history. Family History   Problem Relation Age of Onset    Diabetes Other     Hypertension Other      Social History     Tobacco Use    Smoking status: Never Smoker    Smokeless tobacco: Never Used   Substance Use Topics    Alcohol use: Not Currently           Review of Systems   Constitutional: Negative for malaise/fatigue and weight loss. Eyes: Negative for blurred vision and double vision. Respiratory: Negative for cough and shortness of breath. Cardiovascular: Negative for chest pain, palpitations and leg swelling. Gastrointestinal: Negative for heartburn and nausea. Musculoskeletal: Negative for joint pain and myalgias. Skin: Negative for itching and rash.    Neurological: Negative for dizziness, tingling, loss of consciousness, weakness and headaches. Endo/Heme/Allergies: Does not bruise/bleed easily. Psychiatric/Behavioral: Negative for depression. The patient is not nervous/anxious. Physical Exam  Constitutional:       Appearance: Normal appearance. HENT:      Head: Normocephalic and atraumatic. Eyes:      Extraocular Movements: Extraocular movements intact. Conjunctiva/sclera: Conjunctivae normal.      Pupils: Pupils are equal, round, and reactive to light. Cardiovascular:      Rate and Rhythm: Normal rate and regular rhythm. Pulses: Normal pulses. Pulmonary:      Effort: Pulmonary effort is normal.      Breath sounds: Normal breath sounds. Musculoskeletal:         General: Normal range of motion. Skin:     General: Skin is warm and dry. Neurological:      General: No focal deficit present. Mental Status: He is alert and oriented to person, place, and time. Psychiatric:         Mood and Affect: Mood normal.         Behavior: Behavior normal.           ASSESSMENT and PLAN  Diagnoses and all orders for this visit:    1. Essential hypertension  Comments:  well controlled. Orders:  -     metoprolol succinate (TOPROL-XL) 50 mg XL tablet; metoprolol succinate ER 50 mg tablet,extended release 24 hr  TK 1 T PO QD  -     indapamide (LOZOL) 1.25 mg tablet; TAKE 1 TABLET EVERY MORNING    2. Mixed hyperlipidemia  Comments:  stable. Orders:  -     simvastatin (ZOCOR) 20 mg tablet; simvastatin 20 mg tablet  TAKE 1 TABLET DAILY IN THE EVENING    3. Type 2 diabetes mellitus with diabetic neuropathy, unspecified whether long term insulin use ([facility]  Comments:  well controlled. stable on gabapentin  Orders:  -     SITagliptin (Januvia) 50 mg tablet; Take 1 Tablet by mouth two (2) times a day. -     gabapentin (NEURONTIN) 300 mg capsule; Take 2 Capsules by mouth nightly.  Max Daily Amount: 600 mg.  -     insulin glargine (Lantus Solostar U-100 Insulin) 100 unit/mL (3 mL) inpn; 10 units every morning  -     Insulin Needles, Disposable, 32 gauge x 5/32\" ndle; Use UTD  E11.40: Type 2 diabetes mellitus with diabetic neuropathy, unspecified  -     CBC WITH AUTOMATED DIFF  -     METABOLIC PANEL, COMPREHENSIVE  -     LIPID PANEL  -     HEMOGLOBIN A1C WITH EAG         Dov Morataya NP

## 2021-11-30 NOTE — PROGRESS NOTES
Chief Complaint   Patient presents with    Follow Up Chronic Condition     pt is asking for refills on januvia, simvastatin, insulin, needles, metoprolol, lozol, gabapentin     1. Have you been to the ER, urgent care clinic since your last visit? Hospitalized since your last visit?no    2. Have you seen or consulted any other health care providers outside of the [address] [place] [address] since your last visit? Include any pap smears or colon screening.  no  Visit Vitals  /78 (BP 1 Location: Right arm, BP Patient Position: At rest, BP Cuff Size: Adult)   Pulse 68   Temp 96.8 °F (36 °C) (Temporal)   Resp 18   Ht 5' 7\" (1.702 m)   Wt 186 lb (84.4 kg)   SpO2 98%   BMI 29.13 kg/m²

## 2021-12-01 LAB
ALBUMIN SERPL-MCNC: 3.7 G/DL (ref 3.5–4.6)
ALBUMIN/GLOB SERPL: 1.2 {RATIO} (ref 1.2–2.2)
ALP SERPL-CCNC: 80 IU/L (ref 44–121)
ALT SERPL-CCNC: 10 IU/L (ref 0–44)
AST SERPL-CCNC: 15 IU/L (ref 0–40)
BASOPHILS # BLD AUTO: 0 X10E3/UL (ref 0–0.2)
BASOPHILS NFR BLD AUTO: 1 %
BILIRUB SERPL-MCNC: 0.4 MG/DL (ref 0–1.2)
BUN SERPL-MCNC: 18 MG/DL (ref 10–36)
BUN/CREAT SERPL: 13 (ref 10–24)
CALCIUM SERPL-MCNC: 9.6 MG/DL (ref 8.6–10.2)
CHLORIDE SERPL-SCNC: 101 MMOL/L (ref 96–106)
CHOLEST SERPL-MCNC: 157 MG/DL (ref 100–199)
CO2 SERPL-SCNC: 28 MMOL/L (ref 20–29)
CREAT SERPL-MCNC: 1.35 MG/DL (ref 0.76–1.27)
EOSINOPHIL # BLD AUTO: 0.4 X10E3/UL (ref 0–0.4)
EOSINOPHIL NFR BLD AUTO: 5 %
ERYTHROCYTE [DISTWIDTH] IN BLOOD BY AUTOMATED COUNT: 12.1 % (ref 11.6–15.4)
EST. AVERAGE GLUCOSE BLD GHB EST-MCNC: 169 MG/DL
GLOBULIN SER CALC-MCNC: 3.1 G/DL (ref 1.5–4.5)
GLUCOSE SERPL-MCNC: 190 MG/DL (ref 65–99)
HBA1C MFR BLD: 7.5 % (ref 4.8–5.6)
HCT VFR BLD AUTO: 44.6 % (ref 37.5–51)
HDLC SERPL-MCNC: 47 MG/DL
HGB BLD-MCNC: 14.4 G/DL (ref 13–17.7)
IMM GRANULOCYTES # BLD AUTO: 0.1 X10E3/UL (ref 0–0.1)
IMM GRANULOCYTES NFR BLD AUTO: 1 %
LDLC SERPL CALC-MCNC: 55 MG/DL (ref 0–99)
LYMPHOCYTES # BLD AUTO: 1.7 X10E3/UL (ref 0.7–3.1)
LYMPHOCYTES NFR BLD AUTO: 20 %
MCH RBC QN AUTO: 28.7 PG (ref 26.6–33)
MCHC RBC AUTO-ENTMCNC: 32.3 G/DL (ref 31.5–35.7)
MCV RBC AUTO: 89 FL (ref 79–97)
MONOCYTES # BLD AUTO: 0.7 X10E3/UL (ref 0.1–0.9)
MONOCYTES NFR BLD AUTO: 8 %
NEUTROPHILS # BLD AUTO: 5.7 X10E3/UL (ref 1.4–7)
NEUTROPHILS NFR BLD AUTO: 65 %
PLATELET # BLD AUTO: 141 X10E3/UL (ref 150–450)
POTASSIUM SERPL-SCNC: 4.5 MMOL/L (ref 3.5–5.2)
PROT SERPL-MCNC: 6.8 G/DL (ref 6–8.5)
RBC # BLD AUTO: 5.02 X10E6/UL (ref 4.14–5.8)
SODIUM SERPL-SCNC: 143 MMOL/L (ref 134–144)
TRIGL SERPL-MCNC: 359 MG/DL (ref 0–149)
VLDLC SERPL CALC-MCNC: 55 MG/DL (ref 5–40)
WBC # BLD AUTO: 8.5 X10E3/UL (ref 3.4–10.8)

## 2021-12-01 NOTE — PROGRESS NOTES
A1c is 7.5%, goal is under 7% so be mindful of diet. The rest of the labs look pretty good. Kidney function is at his baseline.

## 2022-01-07 ENCOUNTER — TELEPHONE (OUTPATIENT)
Dept: PRIMARY CARE CLINIC | Age: 87
End: 2022-01-07

## 2022-01-07 DIAGNOSIS — E11.40 TYPE 2 DIABETES MELLITUS WITH DIABETIC NEUROPATHY, UNSPECIFIED WHETHER LONG TERM INSULIN USE (HCC): Primary | ICD-10-CM

## 2022-01-10 RX ORDER — BLOOD-GLUCOSE METER
KIT MISCELLANEOUS
Qty: 100 STRIP | Refills: 3 | Status: SHIPPED | OUTPATIENT
Start: 2022-01-10 | End: 2022-04-27 | Stop reason: SDUPTHER

## 2022-01-31 DIAGNOSIS — E78.2 MIXED HYPERLIPIDEMIA: ICD-10-CM

## 2022-01-31 RX ORDER — SIMVASTATIN 20 MG/1
TABLET, FILM COATED ORAL
Qty: 90 TABLET | Refills: 1 | OUTPATIENT
Start: 2022-01-31

## 2022-03-18 PROBLEM — E11.43 AUTONOMIC NEUROPATHY DUE TO DIABETES (HCC): Status: ACTIVE | Noted: 2020-12-08

## 2022-03-18 PROBLEM — E78.5 HYPERLIPIDEMIA: Status: ACTIVE | Noted: 2020-12-08

## 2022-03-18 PROBLEM — N28.9 RENAL INSUFFICIENCY: Status: ACTIVE | Noted: 2020-12-08

## 2022-03-19 PROBLEM — I10 HYPERTENSIVE DISORDER: Status: ACTIVE | Noted: 2020-12-08

## 2022-03-20 PROBLEM — E11.9 TYPE II DIABETES MELLITUS (HCC): Status: ACTIVE | Noted: 2020-12-08

## 2022-04-27 ENCOUNTER — OFFICE VISIT (OUTPATIENT)
Dept: PRIMARY CARE CLINIC | Age: 87
End: 2022-04-27
Payer: MEDICARE

## 2022-04-27 VITALS
HEIGHT: 67 IN | BODY MASS INDEX: 28.72 KG/M2 | RESPIRATION RATE: 18 BRPM | DIASTOLIC BLOOD PRESSURE: 68 MMHG | OXYGEN SATURATION: 98 % | SYSTOLIC BLOOD PRESSURE: 133 MMHG | WEIGHT: 183 LBS | HEART RATE: 77 BPM | TEMPERATURE: 97.3 F

## 2022-04-27 DIAGNOSIS — E11.40 TYPE 2 DIABETES MELLITUS WITH DIABETIC NEUROPATHY, UNSPECIFIED WHETHER LONG TERM INSULIN USE (HCC): ICD-10-CM

## 2022-04-27 DIAGNOSIS — I10 ESSENTIAL HYPERTENSION: ICD-10-CM

## 2022-04-27 DIAGNOSIS — E78.2 MIXED HYPERLIPIDEMIA: ICD-10-CM

## 2022-04-27 PROCEDURE — G8536 NO DOC ELDER MAL SCRN: HCPCS | Performed by: NURSE PRACTITIONER

## 2022-04-27 PROCEDURE — G8427 DOCREV CUR MEDS BY ELIG CLIN: HCPCS | Performed by: NURSE PRACTITIONER

## 2022-04-27 PROCEDURE — G8419 CALC BMI OUT NRM PARAM NOF/U: HCPCS | Performed by: NURSE PRACTITIONER

## 2022-04-27 PROCEDURE — 99214 OFFICE O/P EST MOD 30 MIN: CPT | Performed by: NURSE PRACTITIONER

## 2022-04-27 PROCEDURE — G8432 DEP SCR NOT DOC, RNG: HCPCS | Performed by: NURSE PRACTITIONER

## 2022-04-27 PROCEDURE — 1101F PT FALLS ASSESS-DOCD LE1/YR: CPT | Performed by: NURSE PRACTITIONER

## 2022-04-27 RX ORDER — SIMVASTATIN 20 MG/1
TABLET, FILM COATED ORAL
Qty: 90 TABLET | Refills: 1 | Status: SHIPPED | OUTPATIENT
Start: 2022-04-27 | End: 2022-10-19 | Stop reason: SDUPTHER

## 2022-04-27 RX ORDER — GABAPENTIN 100 MG/1
100 CAPSULE ORAL 3 TIMES DAILY
Qty: 270 CAPSULE | Refills: 1 | Status: SHIPPED | OUTPATIENT
Start: 2022-04-27 | End: 2022-10-11

## 2022-04-27 RX ORDER — GABAPENTIN 300 MG/1
600 CAPSULE ORAL
Qty: 180 CAPSULE | Refills: 1 | Status: SHIPPED | OUTPATIENT
Start: 2022-04-27 | End: 2022-10-19 | Stop reason: SDUPTHER

## 2022-04-27 RX ORDER — INDAPAMIDE 1.25 MG/1
TABLET, FILM COATED ORAL
Qty: 90 TABLET | Refills: 1 | Status: SHIPPED | OUTPATIENT
Start: 2022-04-27 | End: 2022-10-19 | Stop reason: SDUPTHER

## 2022-04-27 RX ORDER — INSULIN GLARGINE 100 [IU]/ML
INJECTION, SOLUTION SUBCUTANEOUS
Qty: 3 PEN | Refills: 1 | Status: SHIPPED | OUTPATIENT
Start: 2022-04-27

## 2022-04-27 RX ORDER — BLOOD-GLUCOSE METER
KIT MISCELLANEOUS
Qty: 200 STRIP | Refills: 3 | Status: SHIPPED | OUTPATIENT
Start: 2022-04-27

## 2022-04-27 RX ORDER — METOPROLOL SUCCINATE 50 MG/1
TABLET, EXTENDED RELEASE ORAL
Qty: 90 TABLET | Refills: 1 | Status: SHIPPED | OUTPATIENT
Start: 2022-04-27 | End: 2022-10-19 | Stop reason: SDUPTHER

## 2022-04-27 NOTE — PROGRESS NOTES
HISTORY OF PRESENT ILLNESS  Christine Amanda is a 80 y.o. male presents for medication refill. Diabetes: Last A1c was   Lab Results   Component Value Date/Time    Hemoglobin A1c 7.5 (H) 11/30/2021 04:15 PM    . Diabetes well controlled on januvia and lantus. Patient's last eye exam was unknown . Admits neuropathy, takes gabapentin for this. Patient reported that he uses gabapentin 600 mg at night and uses 100 mg during the day TID PRN. Asking for refill of 100 mg tablet. Hypertension: Patients hypertension is well controlled on regimen of metoprolol, indapamide. . Denies headaches, blurred vision or dizziness. Hyperlipidemia: Mixed hyperlipidemia well controlled on simvastatin. Denies any leg cramps or malaise from this medication. Reported compliance with taking medication daily. 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 ([facility] E11.43    Renal insufficiency N28.9    Type II diabetes mellitus ([facility] E11.9     Patient Active Problem List    Diagnosis Date Noted    Hyperlipidemia 12/08/2020    Hypertensive disorder 12/08/2020    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  180 Tablet 1    simvastatin (ZOCOR) 20 mg tablet simvastatin 20 mg tablet  TAKE 1 TABLET DAILY IN THE EVENING 90 Tablet 1    metoprolol succinate (TOPROL-XL) 50 mg XL tablet metoprolol succinate ER 50 mg tablet,extended release 24 hr  TK 1 T PO QD 90 Tablet 1    insulin glargine (Lantus Solostar U-100 Insulin) 100 unit/mL (3 mL) inpn 10 units every morning 3 Pen 1    gabapentin (NEURONTIN) 300 mg capsule Take 2 Capsules by mouth nightly. Max Daily Amount: 600 mg. 180 Capsule 1    gabapentin (NEURONTIN) 100 mg capsule Take 1 Capsule by mouth three (3) times daily. Max Daily Amount: 300 mg. 270 Capsule 1    glucose blood VI test strips (FreeStyle Lite Strips) strip FreeStyle Lite Strips  USE AS DIRECTED 2 TIMES DAILY 200 Strip 3    Eliquis 5 mg tablet       Insulin Needles, Disposable, 32 gauge x 5/32\" ndle Use UTD  E11.40: Type 2 diabetes mellitus with diabetic neuropathy, unspecified 100 Pen Needle 4    triamcinolone acetonide (KENALOG) 0.1 % ointment Apply  to affected area two (2) times a day. use thin layer 30 g 0    mupirocin (BACTROBAN) 2 % ointment Apply  to affected area daily. 22 g 0    senna (Senna) 8.6 mg tablet Take 1 Tablet by mouth nightly. 60 Tablet 1    lancets 28 gauge misc 28 Lancet by Does Not Apply route two (2) times a day. Use as Directed; E11.40: Type 2 diabetes mellitus with diabetic neuropathy, unspecified 100 Lancet 4     Allergies   Allergen Reactions    Penicillins Unknown (comments)     Past Medical History:   Diagnosis Date    [facility]     Hypercholesterolemia     Hypertension      History reviewed. No pertinent surgical history. Family History   Problem Relation Age of Onset    Diabetes Other     Hypertension Other      Social History     Tobacco Use    Smoking status: Never Smoker    Smokeless tobacco: Never Used   Substance Use Topics    Alcohol use: Not Currently           Review of Systems   Constitutional: Negative for malaise/fatigue and weight loss. Eyes: Negative for blurred vision and double vision. Respiratory: Negative for cough and shortness of breath. Cardiovascular: Negative for chest pain, palpitations and leg swelling. Gastrointestinal: Negative for heartburn and nausea. Musculoskeletal: Negative for joint pain and myalgias. Skin: Negative for itching and rash. Neurological: Negative for dizziness, tingling, loss of consciousness, weakness and headaches. Endo/Heme/Allergies: Does not bruise/bleed easily. Psychiatric/Behavioral: Negative for depression. The patient is not nervous/anxious. Physical Exam  Constitutional:       Appearance: Normal appearance. HENT:      Head: Normocephalic and atraumatic. Right Ear: Decreased hearing noted. Left Ear: Decreased hearing noted. Eyes:      Extraocular Movements: Extraocular movements intact. Conjunctiva/sclera: Conjunctivae normal.      Pupils: Pupils are equal, round, and reactive to light. Cardiovascular:      Rate and Rhythm: Normal rate and regular rhythm. Pulses: Normal pulses. Pulmonary:      Effort: Pulmonary effort is normal.      Breath sounds: Normal breath sounds. Musculoskeletal:         General: Normal range of motion. Skin:     General: Skin is warm and dry. Neurological:      General: No focal deficit present. Mental Status: He is alert and oriented to person, place, and time. Psychiatric:         Mood and Affect: Mood normal.         Behavior: Behavior normal.           ASSESSMENT and PLAN  Diagnoses and all orders for this visit:    1. Type 2 diabetes mellitus with diabetic neuropathy, unspecified whether long term insulin use ([facility]  Comments:  recheck labs today. continue gabapentin for neuropathy  Assessment & Plan:   well controlled, continue current medications pending work up below    Orders:  -     SITagliptin (Januvia) 50 mg tablet; Take 1 Tablet by mouth two (2) times a day. -     insulin glargine (Lantus Solostar U-100 Insulin) 100 unit/mL (3 mL) inpn; 10 units every morning  -     gabapentin (NEURONTIN) 300 mg capsule; Take 2 Capsules by mouth nightly. Max Daily Amount: 600 mg.  -     gabapentin (NEURONTIN) 100 mg capsule;  Take 1 Capsule by mouth three (3) times daily. Max Daily Amount: 300 mg.  -     glucose blood VI test strips (FreeStyle Lite Strips) strip; FreeStyle Lite Strips  USE AS DIRECTED 2 TIMES DAILY  -     HEMOGLOBIN A1C WITH EAG  -     METABOLIC PANEL, COMPREHENSIVE    2. Essential hypertension  Comments:  well controlled. Orders:  -     indapamide (LOZOL) 1.25 mg tablet; TAKE 1 TABLET EVERY MORNING  -     metoprolol succinate (TOPROL-XL) 50 mg XL tablet; metoprolol succinate ER 50 mg tablet,extended release 24 hr  TK 1 T PO QD    3. Mixed hyperlipidemia  Comments:  stable.    Orders:  -     simvastatin (ZOCOR) 20 mg tablet; simvastatin 20 mg tablet  TAKE 1 TABLET DAILY IN THE EVENING   Last PDMP Markus as Reviewed:  Review User Review Instant Review Result   Sharda Peak 4/27/2022  3:30 PM Reviewed PDMP [1]         Ean Lawrence NP

## 2022-04-27 NOTE — PROGRESS NOTES
Chief Complaint   Patient presents with    Follow Up Chronic Condition     pt is asking for refills on gabapentin 100 and test strips. pt states he doesn't need any other meds. i inform pt it was 300mg, pt states he takes both         1. Have you been to the ER, urgent care clinic since your last visit? Hospitalized since your last visit?no    2. Have you seen or consulted any other health care providers outside of the [address] [place] [address] since your last visit? Include any pap smears or colon screening.  no    Visit Vitals  /68 (BP 1 Location: Right arm, BP Patient Position: At rest, BP Cuff Size: Adult)   Pulse 77   Temp 97.3 °F (36.3 °C) (Temporal)   Resp 18   Ht 5' 7\" (1.702 m)   Wt 183 lb (83 kg)   SpO2 98%   BMI 28.66 kg/m²

## 2022-04-28 LAB
ALBUMIN SERPL-MCNC: 3.9 G/DL (ref 3.5–4.6)
ALBUMIN/GLOB SERPL: 1.4 {RATIO} (ref 1.2–2.2)
ALP SERPL-CCNC: 79 IU/L (ref 44–121)
ALT SERPL-CCNC: 12 IU/L (ref 0–44)
AST SERPL-CCNC: 20 IU/L (ref 0–40)
BILIRUB SERPL-MCNC: 0.5 MG/DL (ref 0–1.2)
BUN SERPL-MCNC: 18 MG/DL (ref 10–36)
BUN/CREAT SERPL: 13 (ref 10–24)
CALCIUM SERPL-MCNC: 9.1 MG/DL (ref 8.6–10.2)
CHLORIDE SERPL-SCNC: 97 MMOL/L (ref 96–106)
CO2 SERPL-SCNC: 28 MMOL/L (ref 20–29)
CREAT SERPL-MCNC: 1.36 MG/DL (ref 0.76–1.27)
EGFR: 47 ML/MIN/1.73
EST. AVERAGE GLUCOSE BLD GHB EST-MCNC: 169 MG/DL
GLOBULIN SER CALC-MCNC: 2.8 G/DL (ref 1.5–4.5)
GLUCOSE SERPL-MCNC: 215 MG/DL (ref 65–99)
HBA1C MFR BLD: 7.5 % (ref 4.8–5.6)
POTASSIUM SERPL-SCNC: 4.4 MMOL/L (ref 3.5–5.2)
PROT SERPL-MCNC: 6.7 G/DL (ref 6–8.5)
SODIUM SERPL-SCNC: 139 MMOL/L (ref 134–144)

## 2022-04-28 NOTE — PROGRESS NOTES
A1c is 7.5% so just be mindful of sweets. Kidney function mildly elevated but at his baseline. We will see him back in 3 months.      *states he has difficulty hearing and likes paper copy if you want to send a letter*

## 2022-05-06 ENCOUNTER — TELEPHONE (OUTPATIENT)
Dept: PRIMARY CARE CLINIC | Age: 87
End: 2022-05-06

## 2022-07-27 ENCOUNTER — OFFICE VISIT (OUTPATIENT)
Dept: PRIMARY CARE CLINIC | Age: 87
End: 2022-07-27
Payer: MEDICARE

## 2022-07-27 VITALS
DIASTOLIC BLOOD PRESSURE: 64 MMHG | HEART RATE: 61 BPM | WEIGHT: 182 LBS | BODY MASS INDEX: 28.56 KG/M2 | SYSTOLIC BLOOD PRESSURE: 130 MMHG | OXYGEN SATURATION: 96 % | RESPIRATION RATE: 18 BRPM | HEIGHT: 67 IN | TEMPERATURE: 97.3 F

## 2022-07-27 DIAGNOSIS — R42 DIZZINESS: Primary | ICD-10-CM

## 2022-07-27 DIAGNOSIS — Z79.4 TYPE 2 DIABETES MELLITUS WITH DIABETIC POLYNEUROPATHY, WITH LONG-TERM CURRENT USE OF INSULIN (HCC): ICD-10-CM

## 2022-07-27 DIAGNOSIS — N18.31 STAGE 3A CHRONIC KIDNEY DISEASE (HCC): ICD-10-CM

## 2022-07-27 DIAGNOSIS — E11.42 TYPE 2 DIABETES MELLITUS WITH DIABETIC POLYNEUROPATHY, WITH LONG-TERM CURRENT USE OF INSULIN (HCC): ICD-10-CM

## 2022-07-27 DIAGNOSIS — H61.23 BILATERAL IMPACTED CERUMEN: ICD-10-CM

## 2022-07-27 DIAGNOSIS — Z00.00 MEDICARE ANNUAL WELLNESS VISIT, SUBSEQUENT: ICD-10-CM

## 2022-07-27 PROBLEM — N18.30 CHRONIC RENAL DISEASE, STAGE III (HCC): Status: ACTIVE | Noted: 2022-07-27

## 2022-07-27 PROCEDURE — G8536 NO DOC ELDER MAL SCRN: HCPCS | Performed by: NURSE PRACTITIONER

## 2022-07-27 PROCEDURE — 1123F ACP DISCUSS/DSCN MKR DOCD: CPT | Performed by: NURSE PRACTITIONER

## 2022-07-27 PROCEDURE — 1101F PT FALLS ASSESS-DOCD LE1/YR: CPT | Performed by: NURSE PRACTITIONER

## 2022-07-27 PROCEDURE — 3051F HG A1C>EQUAL 7.0%<8.0%: CPT | Performed by: NURSE PRACTITIONER

## 2022-07-27 PROCEDURE — G8419 CALC BMI OUT NRM PARAM NOF/U: HCPCS | Performed by: NURSE PRACTITIONER

## 2022-07-27 PROCEDURE — G0439 PPPS, SUBSEQ VISIT: HCPCS | Performed by: NURSE PRACTITIONER

## 2022-07-27 PROCEDURE — G8427 DOCREV CUR MEDS BY ELIG CLIN: HCPCS | Performed by: NURSE PRACTITIONER

## 2022-07-27 PROCEDURE — G8432 DEP SCR NOT DOC, RNG: HCPCS | Performed by: NURSE PRACTITIONER

## 2022-07-27 NOTE — PROGRESS NOTES
HISTORY OF PRESENT ILLNESS  Cody Michele is a 80 y.o. male presents for chronic office visit and dizziness. Dizziness: Patient reported that he has been feeling woozy intermittently. Patient notes that he will lay down for 30-60 minutes and will feel better. Keeps an eye on glucose with normal reading. Patient notes he feels off balance often, has been getting worse over the years. Uses a cart at the store. Patient has a rollator at home but prefers not use it. States he drinks dr. Linda Peterson and water, maybe 3-4 glasses of water a day. Diabetes: Last A1c was   Lab Results   Component Value Date/Time    Hemoglobin A1c 7.5 (H) 04/27/2022 03:51 PM    . Diabetes well controlled on januvia and lantus. Patient's last eye exam was unknown . Admits neuropathy, takes gabapentin for this. Patient reported that he uses gabapentin 600 mg at night and uses 100 mg during the day TID PRN. Hypertension: Patients hypertension is well controlled on regimen of metoprolol, indapamide. . Denies headaches, blurred vision or dizziness. Hyperlipidemia: Mixed hyperlipidemia well controlled on simvastatin. Denies any leg cramps or malaise from this medication. Reported compliance with taking medication daily. Patient reported he would like to call when he needs refills. Asking we don't send any in now.      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 ([facility] E11.43    Renal insufficiency N28.9    Type II diabetes mellitus (HCC) E11.9    Chronic renal disease, stage III N18.30     Patient Active Problem List    Diagnosis Date Noted    Chronic renal disease, stage III 07/27/2022    Hyperlipidemia 12/08/2020    Hypertensive disorder 12/08/2020 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 180 Tablet 1    simvastatin (ZOCOR) 20 mg tablet simvastatin 20 mg tablet  TAKE 1 TABLET DAILY IN THE EVENING 90 Tablet 1    metoprolol succinate (TOPROL-XL) 50 mg XL tablet metoprolol succinate ER 50 mg tablet,extended release 24 hr  TK 1 T PO QD 90 Tablet 1    insulin glargine (Lantus Solostar U-100 Insulin) 100 unit/mL (3 mL) inpn 10 units every morning 3 Pen 1    gabapentin (NEURONTIN) 300 mg capsule Take 2 Capsules by mouth nightly. Max Daily Amount: 600 mg. 180 Capsule 1    gabapentin (NEURONTIN) 100 mg capsule Take 1 Capsule by mouth three (3) times daily. Max Daily Amount: 300 mg. 270 Capsule 1    glucose blood VI test strips (FreeStyle Lite Strips) strip FreeStyle Lite Strips  USE AS DIRECTED 2 TIMES DAILY 200 Strip 3    Eliquis 5 mg tablet       Insulin Needles, Disposable, 32 gauge x 5/32\" ndle Use UTD  E11.40: Type 2 diabetes mellitus with diabetic neuropathy, unspecified 100 Pen Needle 4    triamcinolone acetonide (KENALOG) 0.1 % ointment Apply  to affected area two (2) times a day. use thin layer 30 g 0    mupirocin (BACTROBAN) 2 % ointment Apply  to affected area daily. 22 g 0    lancets 28 gauge [facility] 28 Lancet by Does Not Apply route two (2) times a day. Use as Directed; E11.40: Type 2 diabetes mellitus with diabetic neuropathy, unspecified 100 Lancet 4     Allergies   Allergen Reactions    Penicillins Unknown (comments)     Past Medical History:   Diagnosis Date    Diabetes ([facility]     Hypercholesterolemia     Hypertension      History reviewed. No pertinent surgical history.   Family History   Problem Relation Age of Onset    Diabetes Other Hypertension Other      Social History     Tobacco Use    Smoking status: Never    Smokeless tobacco: Never   Substance Use Topics    Alcohol use: Not Currently           Review of Systems   Constitutional:  Negative for malaise/fatigue and weight loss. Eyes:  Negative for blurred vision and double vision. Respiratory:  Negative for cough and shortness of breath. Cardiovascular:  Negative for chest pain, palpitations and leg swelling. Gastrointestinal:  Negative for heartburn and nausea. Musculoskeletal:  Negative for joint pain and myalgias. Skin:  Negative for itching and rash. Neurological:  Negative for dizziness, tingling, loss of consciousness, weakness and headaches. Endo/Heme/Allergies:  Does not bruise/bleed easily. Psychiatric/Behavioral:  Negative for depression. The patient is not nervous/anxious. Physical Exam  Constitutional:       Appearance: Normal appearance. HENT:      Head: Normocephalic and atraumatic. Right Ear: Decreased hearing noted. There is impacted cerumen. Left Ear: Decreased hearing noted. There is impacted cerumen. Eyes:      Extraocular Movements: Extraocular movements intact. Conjunctiva/sclera: Conjunctivae normal.      Pupils: Pupils are equal, round, and reactive to light. Cardiovascular:      Rate and Rhythm: Normal rate and regular rhythm. Pulses: Normal pulses. Pulmonary:      Effort: Pulmonary effort is normal.      Breath sounds: Normal breath sounds. Musculoskeletal:         General: Normal range of motion. Skin:     General: Skin is warm and dry. Neurological:      General: No focal deficit present. Mental Status: He is alert and oriented to person, place, and time. Psychiatric:         Mood and Affect: Mood normal.         Behavior: Behavior normal.         ASSESSMENT and PLAN  Diagnoses and all orders for this visit:    1. Dizziness    2.  Stage 3a chronic kidney disease (HCC)  -     CBC WITH AUTOMATED DIFF  - METABOLIC PANEL, COMPREHENSIVE    3. Type 2 diabetes mellitus with diabetic polyneuropathy, with long-term current use of insulin (HCC)  -     HEMOGLOBIN A1C WITH EAG    4.  Medicare annual wellness visit, subsequent     Last PDMP Val Randolph as Reviewed:  Review User Review Instant Review Result   Jose Thompson 4/27/2022  3:30 PM Reviewed PDMP [1]         Maurilio Lee NP

## 2022-07-27 NOTE — PROGRESS NOTES
Ami Case is a 80 y.o. male presents for Medicare wellness visit. This is a Subsequent Medicare Annual Wellness Visit (AWV), (Performed more than 12 months after effective date of Medicare Part B enrollment and 12 months after last preventive visit.)    I have reviewed the patient's medical history in detail and updated the computerized patient record. History obtained from: the patient. male  80 y.o. WHITE/NON-  Depression Risk Factor Screening:     3 most recent PHQ Screens 7/27/2022   Little interest or pleasure in doing things Not at all   Feeling down, depressed, irritable, or hopeless Not at all   Total Score PHQ 2 0          Fall Risk Factor Screening:     Fall Risk Assessment, last 12 mths 7/27/2022   Able to walk? Yes   Fall in past 12 months? 1   Do you feel unsteady? 1   Are you worried about falling 1   Is the gait abnormal? 1   Number of falls in past 12 months 2   Fall with injury? 0       Alcohol Risk Screen    Do you average more than 1 drink per night or more than 7 drinks a week: No    In the past three months have you have had more than 4 drinks containing alcohol on one occasion: No         Functional Ability and Level of Safety:    Hearing: The patient wears hearing aids. Activities of Daily Living: The home contains: handrails and grab bars  Patient does total self care      Ambulation: with mild difficulty      Abuse Screen:  Patient is not abused         History and Pertinent Exam   Patient is due for chronic medical conditions and/or has acute concerns in addition to the medicare wellness visit and agrees to do both, understanding there may be a copay for the additional services provided. Other Concerns today:     Health & Diet:   Please describe your diet habits: Regular diet  Do you get 5 servings of fruits or vegetables daily? no  Do you exercise regularly?  yes    ROS  Reviewed PmHx, FmHx, SocHx as well as meds and allergies, updated and dated in the chart. 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 No pertinent surgical history. Allergies   Allergen Reactions    Penicillins Unknown (comments)     Current Outpatient Medications   Medication Sig Dispense Refill    indapamide (LOZOL) 1.25 mg tablet TAKE 1 TABLET EVERY MORNING 90 Tablet 1    SITagliptin (Januvia) 50 mg tablet Take 1 Tablet by mouth two (2) times a day. 180 Tablet 1    simvastatin (ZOCOR) 20 mg tablet simvastatin 20 mg tablet  TAKE 1 TABLET DAILY IN THE EVENING 90 Tablet 1    metoprolol succinate (TOPROL-XL) 50 mg XL tablet metoprolol succinate ER 50 mg tablet,extended release 24 hr  TK 1 T PO QD 90 Tablet 1    insulin glargine (Lantus Solostar U-100 Insulin) 100 unit/mL (3 mL) inpn 10 units every morning 3 Pen 1    gabapentin (NEURONTIN) 300 mg capsule Take 2 Capsules by mouth nightly. Max Daily Amount: 600 mg. 180 Capsule 1    gabapentin (NEURONTIN) 100 mg capsule Take 1 Capsule by mouth three (3) times daily. Max Daily Amount: 300 mg. 270 Capsule 1    glucose blood VI test strips (FreeStyle Lite Strips) strip FreeStyle Lite Strips  USE AS DIRECTED 2 TIMES DAILY 200 Strip 3    Eliquis 5 mg tablet       Insulin Needles, Disposable, 32 gauge x 5/32\" ndle Use UTD  E11.40: Type 2 diabetes mellitus with diabetic neuropathy, unspecified 100 Pen Needle 4    triamcinolone acetonide (KENALOG) 0.1 % ointment Apply  to affected area two (2) times a day. use thin layer 30 g 0    mupirocin (BACTROBAN) 2 % ointment Apply  to affected area daily. 22 g 0    lancets 28 gauge misc 28 Lancet by Does Not Apply route two (2) times a day.  Use as Directed; E11.40: Type 2 diabetes mellitus with diabetic neuropathy, unspecified 100 Lancet 4     Family History   Problem Relation Age of Onset    Diabetes Other     Hypertension Other      Social History     Tobacco Use    Smoking status: Never    Smokeless tobacco: Never   Substance Use Topics    Alcohol use: Not Currently         BP Readings from Last 3 Encounters:   07/27/22 130/64   04/27/22 133/68   11/30/21 132/78      Wt Readings from Last 3 Encounters:   07/27/22 182 lb (82.6 kg)   04/27/22 183 lb (83 kg)   11/30/21 186 lb (84.4 kg)     Body mass index is 28.51 kg/m². No results found. Physical Exam  Was the patient's timed Up & Go test unsteady or longer than 30 seconds? no    Evaluation of Cognitive Function   Mood/affect:  neutral, happy  Orientation: Person, Place, Time, and Situation  Appearance: age appropriate and casually dressed  Family member/caregiver input: None  Normal Cognitive Function during exam  Specialists/Care Team   Nicholas Farooq has established care with the following healthcare providers:    Patient Care Team:  Tera Jose NP as PCP - General (Nurse Practitioner)  Tera Jose NP as PCP - [facility] [address] Maintenance Topics with due status: Overdue       Topic Date Due    MICROALBUMIN Q1 Never done    Eye Exam Retinal or Dilated Never done    Shingrix Vaccine Age 50> Never done    COVID-19 Vaccine 09/11/2021    Foot Exam Q1 02/08/2022     Health Maintenance Topics with due status: Not Due       Topic Last Completion Date    Lipid Screen 11/30/2021    Depression Screen 04/27/2022    Medicare Yearly Exam 07/27/2022    Flu Vaccine Not Due         Colon cancer:  Recommendation: Colonoscopy every 10y or annual FIT test from 50-75 or every 3 year stool DNA based test with consideration of ongoing screening from 76-85.  and Not Indicated    Lung cancer (LDCT): Recommendation: Yearly LDCT for pts 55-77 w 30-pack year hx and currently smoke or quit <15 yr ago. and Not Indicated    Hepatitis C:  Recommendation: One time screening for all patient's aged 18-79. and Up to date or Completed    Diabetes:   Recommendation: USPSTF recommends screening ages 38-67 y/o if overweight or obese. Medicare covers screening in those patients who are overweight, obese, have HTN or dyslipiemia, a personal history of gestational diabetes or prior elevated blood sugar, a family hx of DM, or any patient over age 72 and Due, ordered    Lipids:   Recommendation: screening for hyperlipidemia every 5 years after age 39 and Up to date or Completed        PREVENTIVE CARE - MALE SCREENINGS     Prostate cancer: Recommendation: Consider PSA screening every 2-4 yr from age 53-78 after review of pros and cons. Medicare covers annual PSA screening. and Not Indicated    AAA:   Recommendation: One-time screening if family history of AAA or smoking history of at least 100 cigarettes lifetime and Not Indicated      IMMUNIZATIONS     Immunization History   Administered Date(s) Administered    COVID-19, MODERNA BLUE border, Primary or Immunocompromised, (age 18y+), IM, 100 mcg/0.5mL 03/13/2021, 04/11/2021       Pneumovax:   Recommendation: PPSV23 once for all >65 and high risk <65  and Up to date or Completed    Prevnar:   Recommendation: PCV13 only if >65 and immunocompromised or residing in a nursing home, or in areas of low childhood Pneumococcal vaccination and Not Indicated    Influenza:   Recommendation: Vaccination annually, high dose if 65 or older and Up to date or Completed    Shingrix:  Recommendation: Vaccination 2 shots 2-6 months apart for all age >47 and Declined    TDaP:    Recommendation: Vaccination Booster with TDaP every 10 yr. and Declined    Discussion of Advance Directive   Discussed with Michael Farooq his ability to prepare and advance directive in the case that an injury or illness causes him to be unable to make health care decisions.        Date of ACP Conversation: 07/27/22  Persons included in Conversation:  patient  Length of ACP Conversation in minutes:  <16 minutes (Non-Billable)    Authorized Decision Maker (if patient is incapable of making informed decisions): This person is:   Named in Advance Directive or Healthcare Power of       Primary Decision Maker: Dedra Finch - Other Relative - [phone]        For Patients with Decision Making Capacity:   Values/Goals: Exploration of values, goals, and preferences if recovery is not expected, even with continued medical treatment in the event of:  Imminent death    Conversation Outcomes / Follow-Up Plan:   ACP in process - information provided, considering goals and options    Assessment/Plan   V70.0,     Diagnoses and all orders for this visit:    1. Dizziness  Comments:  related to hydration?  will check labs for underlying cause. 2. Stage 3a chronic kidney disease (HCC)  -     CBC WITH AUTOMATED DIFF  -     METABOLIC PANEL, COMPREHENSIVE    3. Type 2 diabetes mellitus with diabetic polyneuropathy, with long-term current use of insulin ([facility]  Comments:  checking lab. No hypoglycemic episodes  Orders:  -     HEMOGLOBIN A1C WITH EAG    4. Medicare annual wellness visit, subsequent    5. Bilateral impacted cerumen  Comments:  removed today by Kalie Reyes LPN.              Fox Jeter NP

## 2022-07-27 NOTE — PROGRESS NOTES
Chief Complaint   Patient presents with    Follow Up Chronic Condition     Pt is here for COV but doesn't want any refills sent yet because pharmacy delivery is sending him to much stuff. Pt states he will call when refills are needed    Dizziness     Pt states having dizzy spells and issues with balance          1. Have you been to the ER, urgent care clinic since your last visit? Hospitalized since your last visit?no    2. Have you seen or consulted any other health care providers outside of the [address] [place] [address] since your last visit? Include any pap smears or colon screening.  no    Visit Vitals  /64 (BP 1 Location: Right arm, BP Patient Position: At rest, BP Cuff Size: Adult)   Pulse 61   Temp 97.3 °F (36.3 °C) (Temporal)   Resp 18   Ht 5' 7\" (1.702 m)   Wt 182 lb (82.6 kg)   SpO2 96%   BMI 28.51 kg/m²

## 2022-07-28 LAB
ALBUMIN SERPL-MCNC: 3.7 G/DL (ref 3.5–4.6)
ALBUMIN/GLOB SERPL: 1.4 {RATIO} (ref 1.2–2.2)
ALP SERPL-CCNC: 86 IU/L (ref 44–121)
ALT SERPL-CCNC: 7 IU/L (ref 0–44)
AST SERPL-CCNC: 14 IU/L (ref 0–40)
BASOPHILS # BLD AUTO: 0 X10E3/UL (ref 0–0.2)
BASOPHILS NFR BLD AUTO: 0 %
BILIRUB SERPL-MCNC: 0.4 MG/DL (ref 0–1.2)
BUN SERPL-MCNC: 16 MG/DL (ref 10–36)
BUN/CREAT SERPL: 13 (ref 10–24)
CALCIUM SERPL-MCNC: 9.1 MG/DL (ref 8.6–10.2)
CHLORIDE SERPL-SCNC: 98 MMOL/L (ref 96–106)
CO2 SERPL-SCNC: 25 MMOL/L (ref 20–29)
CREAT SERPL-MCNC: 1.24 MG/DL (ref 0.76–1.27)
EGFR: 53 ML/MIN/1.73
EOSINOPHIL # BLD AUTO: 0.3 X10E3/UL (ref 0–0.4)
EOSINOPHIL NFR BLD AUTO: 4 %
ERYTHROCYTE [DISTWIDTH] IN BLOOD BY AUTOMATED COUNT: 13 % (ref 11.6–15.4)
EST. AVERAGE GLUCOSE BLD GHB EST-MCNC: 166 MG/DL
GLOBULIN SER CALC-MCNC: 2.7 G/DL (ref 1.5–4.5)
GLUCOSE SERPL-MCNC: 198 MG/DL (ref 65–99)
HBA1C MFR BLD: 7.4 % (ref 4.8–5.6)
HCT VFR BLD AUTO: 41.6 % (ref 37.5–51)
HGB BLD-MCNC: 13.6 G/DL (ref 13–17.7)
IMM GRANULOCYTES # BLD AUTO: 0.1 X10E3/UL (ref 0–0.1)
IMM GRANULOCYTES NFR BLD AUTO: 1 %
LYMPHOCYTES # BLD AUTO: 1.7 X10E3/UL (ref 0.7–3.1)
LYMPHOCYTES NFR BLD AUTO: 23 %
MCH RBC QN AUTO: 28.8 PG (ref 26.6–33)
MCHC RBC AUTO-ENTMCNC: 32.7 G/DL (ref 31.5–35.7)
MCV RBC AUTO: 88 FL (ref 79–97)
MONOCYTES # BLD AUTO: 0.6 X10E3/UL (ref 0.1–0.9)
MONOCYTES NFR BLD AUTO: 8 %
NEUTROPHILS # BLD AUTO: 4.8 X10E3/UL (ref 1.4–7)
NEUTROPHILS NFR BLD AUTO: 64 %
PLATELET # BLD AUTO: 154 X10E3/UL (ref 150–450)
POTASSIUM SERPL-SCNC: 4.2 MMOL/L (ref 3.5–5.2)
PROT SERPL-MCNC: 6.4 G/DL (ref 6–8.5)
RBC # BLD AUTO: 4.73 X10E6/UL (ref 4.14–5.8)
SODIUM SERPL-SCNC: 140 MMOL/L (ref 134–144)
WBC # BLD AUTO: 7.5 X10E3/UL (ref 3.4–10.8)

## 2022-07-28 NOTE — PROGRESS NOTES
Lab work is all normal.  A1c is 7.4%.     Blood counts, kidney function and electrolytes are normal.   Make sure to stay well hydrated

## 2022-10-11 DIAGNOSIS — E11.40 TYPE 2 DIABETES MELLITUS WITH DIABETIC NEUROPATHY, UNSPECIFIED WHETHER LONG TERM INSULIN USE (HCC): ICD-10-CM

## 2022-10-11 RX ORDER — GABAPENTIN 100 MG/1
CAPSULE ORAL
Qty: 270 CAPSULE | Refills: 1 | Status: SHIPPED | OUTPATIENT
Start: 2022-10-11

## 2022-10-19 ENCOUNTER — OFFICE VISIT (OUTPATIENT)
Dept: PRIMARY CARE CLINIC | Age: 87
End: 2022-10-19
Payer: MEDICARE

## 2022-10-19 ENCOUNTER — TELEPHONE (OUTPATIENT)
Dept: PRIMARY CARE CLINIC | Age: 87
End: 2022-10-19

## 2022-10-19 VITALS
HEIGHT: 67 IN | DIASTOLIC BLOOD PRESSURE: 80 MMHG | RESPIRATION RATE: 18 BRPM | OXYGEN SATURATION: 97 % | WEIGHT: 183 LBS | TEMPERATURE: 98.1 F | SYSTOLIC BLOOD PRESSURE: 115 MMHG | BODY MASS INDEX: 28.72 KG/M2 | HEART RATE: 72 BPM

## 2022-10-19 DIAGNOSIS — E11.40 TYPE 2 DIABETES MELLITUS WITH DIABETIC NEUROPATHY, UNSPECIFIED WHETHER LONG TERM INSULIN USE (HCC): ICD-10-CM

## 2022-10-19 DIAGNOSIS — I10 ESSENTIAL HYPERTENSION: ICD-10-CM

## 2022-10-19 DIAGNOSIS — E78.2 MIXED HYPERLIPIDEMIA: ICD-10-CM

## 2022-10-19 DIAGNOSIS — B35.1 FUNGAL NAIL INFECTION: Primary | ICD-10-CM

## 2022-10-19 PROCEDURE — G8536 NO DOC ELDER MAL SCRN: HCPCS | Performed by: NURSE PRACTITIONER

## 2022-10-19 PROCEDURE — 1123F ACP DISCUSS/DSCN MKR DOCD: CPT | Performed by: NURSE PRACTITIONER

## 2022-10-19 PROCEDURE — 1101F PT FALLS ASSESS-DOCD LE1/YR: CPT | Performed by: NURSE PRACTITIONER

## 2022-10-19 PROCEDURE — G8432 DEP SCR NOT DOC, RNG: HCPCS | Performed by: NURSE PRACTITIONER

## 2022-10-19 PROCEDURE — G8427 DOCREV CUR MEDS BY ELIG CLIN: HCPCS | Performed by: NURSE PRACTITIONER

## 2022-10-19 PROCEDURE — G8417 CALC BMI ABV UP PARAM F/U: HCPCS | Performed by: NURSE PRACTITIONER

## 2022-10-19 PROCEDURE — 99214 OFFICE O/P EST MOD 30 MIN: CPT | Performed by: NURSE PRACTITIONER

## 2022-10-19 PROCEDURE — 3051F HG A1C>EQUAL 7.0%<8.0%: CPT | Performed by: NURSE PRACTITIONER

## 2022-10-19 RX ORDER — METOPROLOL SUCCINATE 50 MG/1
TABLET, EXTENDED RELEASE ORAL
Qty: 90 TABLET | Refills: 1 | Status: SHIPPED | OUTPATIENT
Start: 2022-10-19

## 2022-10-19 RX ORDER — SIMVASTATIN 20 MG/1
TABLET, FILM COATED ORAL
Qty: 90 TABLET | Refills: 1 | Status: SHIPPED | OUTPATIENT
Start: 2022-10-19

## 2022-10-19 RX ORDER — GABAPENTIN 300 MG/1
600 CAPSULE ORAL
Qty: 180 CAPSULE | Refills: 1 | Status: SHIPPED | OUTPATIENT
Start: 2022-10-19

## 2022-10-19 RX ORDER — INDAPAMIDE 1.25 MG/1
TABLET, FILM COATED ORAL
Qty: 90 TABLET | Refills: 1 | Status: SHIPPED | OUTPATIENT
Start: 2022-10-19

## 2022-10-19 NOTE — PROGRESS NOTES
No chief complaint on file. Visit Vitals  /80 (BP 1 Location: Left upper arm, BP Patient Position: Sitting, BP Cuff Size: Small adult)   Pulse 72   Temp 98.1 °F (36.7 °C) (Oral)   Resp 18   Ht 5' 7\" (1.702 m)   Wt 183 lb (83 kg)   SpO2 97%   BMI 28.66 kg/m²       1. Have you been to the ER, urgent care clinic since your last visit? Hospitalized since your last visit? no    2. Have you seen or consulted any other health care providers outside of the [address] [place] [address] since your last visit? Include any pap smears or colon screening.   no

## 2022-10-19 NOTE — PROGRESS NOTES
HISTORY OF PRESENT ILLNESS  Griffin Palacios is a 80 y.o. male presents for toe concern. Patient reported that his great toe on left foot is having thickness and discoloration to toenail. Denies pain to area. Has been using OTC ointment to it. States he gets toenails trimmed at nail salon and last month they couldn't cut great toe due to thickness. Diabetes: Last A1c was   Lab Results   Component Value Date/Time    Hemoglobin A1c 7.4 (H) 07/27/2022 03:52 PM    Diabetes well controlled on januvia and lantus. Patient's last eye exam was unknown . Admits neuropathy, takes gabapentin for this. Patient reported that he uses gabapentin 600 mg at night and uses 100 mg during the day TID PRN. Hypertension: Patients hypertension is well controlled on regimen of metoprolol, indapamide. . Denies headaches, blurred vision or dizziness. Hyperlipidemia: Mixed hyperlipidemia well controlled on simvastatin. Denies any leg cramps or malaise from this medication. Reported compliance with taking medication daily. 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 ([facility] E11.43    Renal insufficiency N28.9    Type II diabetes mellitus (HCC) E11.9    Chronic renal disease, stage III N18.30     Patient Active Problem List    Diagnosis Date Noted    Chronic renal disease, stage III 07/27/2022    Hyperlipidemia 12/08/2020    Hypertensive disorder 12/08/2020    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  180 Tablet 1 simvastatin (ZOCOR) 20 mg tablet simvastatin 20 mg tablet  TAKE 1 TABLET DAILY IN THE EVENING 90 Tablet 1    indapamide (LOZOL) 1.25 mg tablet TAKE 1 TABLET EVERY MORNING 90 Tablet 1    metoprolol succinate (TOPROL-XL) 50 mg XL tablet metoprolol succinate ER 50 mg tablet,extended release 24 hr  TK 1 T PO QD 90 Tablet 1    gabapentin (NEURONTIN) 300 mg capsule Take 2 Capsules by mouth nightly. Max Daily Amount: 600 mg. 180 Capsule 1    gabapentin (NEURONTIN) 100 mg capsule TAKE 1 CAPSULE THREE TIMES A  Capsule 1    insulin glargine (Lantus Solostar U-100 Insulin) 100 unit/mL (3 mL) inpn 10 units every morning 3 Pen 1    glucose blood VI test strips (FreeStyle Lite Strips) strip FreeStyle Lite Strips  USE AS DIRECTED 2 TIMES DAILY 200 Strip 3    Eliquis 5 mg tablet       Insulin Needles, Disposable, 32 gauge x 5/32\" ndle Use UTD  E11.40: Type 2 diabetes mellitus with diabetic neuropathy, unspecified 100 Pen Needle 4    triamcinolone acetonide (KENALOG) 0.1 % ointment Apply  to affected area two (2) times a day. use thin layer 30 g 0    mupirocin (BACTROBAN) 2 % ointment Apply  to affected area daily. 22 g 0    lancets 28 gauge misc 28 Lancet by Does Not Apply route two (2) times a day. Use as Directed; E11.40: Type 2 diabetes mellitus with diabetic neuropathy, unspecified 100 Lancet 4     Allergies   Allergen Reactions    Penicillins Unknown (comments)     Past Medical History:   Diagnosis Date    Diabetes ([facility]     Hypercholesterolemia     Hypertension      History reviewed. No pertinent surgical history. Family History   Problem Relation Age of Onset    Diabetes Other     Hypertension Other      Social History     Tobacco Use    Smoking status: Never    Smokeless tobacco: Never   Substance Use Topics    Alcohol use: Not Currently           Review of Systems   Constitutional:  Negative for malaise/fatigue and weight loss. Eyes:  Negative for blurred vision and double vision.    Respiratory:  Negative for shortness of breath. Cardiovascular:  Negative for chest pain and palpitations. Skin:         Toenail thick   Neurological:  Negative for dizziness, tingling, tremors and headaches. Psychiatric/Behavioral:  The patient is not nervous/anxious and does not have insomnia. Physical Exam  Constitutional:       Appearance: Normal appearance. HENT:      Head: Normocephalic. Cardiovascular:      Rate and Rhythm: Normal rate and regular rhythm. Pulses: Normal pulses. Heart sounds: Normal heart sounds. Pulmonary:      Breath sounds: Normal breath sounds. Feet:      Left foot:      Toenail Condition: Left toenails are abnormally thick. Fungal disease present. Skin:     General: Skin is dry. Neurological:      Mental Status: He is alert. ASSESSMENT and PLAN  Diagnoses and all orders for this visit:    1. Fungal nail infection  -     REFERRAL TO PODIATRY    2. Type 2 diabetes mellitus with diabetic neuropathy, unspecified whether long term insulin use ([facility]  Comments:  recheck labs today. continue gabapentin for neuropathy  Orders:  -     SITagliptin (Januvia) 50 mg tablet; Take 1 Tablet by mouth two (2) times a day. -     gabapentin (NEURONTIN) 300 mg capsule; Take 2 Capsules by mouth nightly. Max Daily Amount: 600 mg.    3. Mixed hyperlipidemia  Comments:  stable. Orders:  -     simvastatin (ZOCOR) 20 mg tablet; simvastatin 20 mg tablet  TAKE 1 TABLET DAILY IN THE EVENING    4. Essential hypertension  Comments:  well controlled.    Orders:  -     indapamide (LOZOL) 1.25 mg tablet; TAKE 1 TABLET EVERY MORNING  -     metoprolol succinate (TOPROL-XL) 50 mg XL tablet; metoprolol succinate ER 50 mg tablet,extended release 24 hr  TK 1 T PO QD     Last PDMP Markus as Reviewed:  Review User Review Instant Review Result   Lorie West 10/19/2022  2:28 PM Reviewed PDMP [1]       Thai Kerr NP

## 2022-10-19 NOTE — TELEPHONE ENCOUNTER
Pt left office and returned with Senokot regular strength 50 capsules.  Said to give it to Ms Melissa Valentino

## 2022-12-15 ENCOUNTER — TELEPHONE (OUTPATIENT)
Dept: PRIMARY CARE CLINIC | Age: 87
End: 2022-12-15

## 2023-01-06 ENCOUNTER — OFFICE VISIT (OUTPATIENT)
Dept: PRIMARY CARE CLINIC | Age: 88
End: 2023-01-06
Payer: MEDICARE

## 2023-01-06 VITALS
RESPIRATION RATE: 20 BRPM | HEART RATE: 75 BPM | WEIGHT: 182.8 LBS | BODY MASS INDEX: 28.69 KG/M2 | DIASTOLIC BLOOD PRESSURE: 59 MMHG | OXYGEN SATURATION: 97 % | TEMPERATURE: 96.9 F | SYSTOLIC BLOOD PRESSURE: 119 MMHG | HEIGHT: 67 IN

## 2023-01-06 DIAGNOSIS — L98.9 SKIN LESION OF FACE: Primary | ICD-10-CM

## 2023-01-06 PROCEDURE — G8536 NO DOC ELDER MAL SCRN: HCPCS | Performed by: FAMILY MEDICINE

## 2023-01-06 PROCEDURE — G8417 CALC BMI ABV UP PARAM F/U: HCPCS | Performed by: FAMILY MEDICINE

## 2023-01-06 PROCEDURE — G8427 DOCREV CUR MEDS BY ELIG CLIN: HCPCS | Performed by: FAMILY MEDICINE

## 2023-01-06 PROCEDURE — 1123F ACP DISCUSS/DSCN MKR DOCD: CPT | Performed by: FAMILY MEDICINE

## 2023-01-06 PROCEDURE — 99213 OFFICE O/P EST LOW 20 MIN: CPT | Performed by: FAMILY MEDICINE

## 2023-01-06 PROCEDURE — 1101F PT FALLS ASSESS-DOCD LE1/YR: CPT | Performed by: FAMILY MEDICINE

## 2023-01-06 PROCEDURE — G8432 DEP SCR NOT DOC, RNG: HCPCS | Performed by: FAMILY MEDICINE

## 2023-01-06 NOTE — PROGRESS NOTES
HPI     Chief Complaint   Patient presents with    Skin Problem     Patient reports \"a mole on the right side of the forehead and the right side of the nose\". HPI:  Edita Art is a 80 y.o. male who has a history of DM and has noticed an enlarging lesion on the side of his right temple which is now uncomfortable to the touch. He's previously had skin cancer removed by Derm. No recent appts. Allergies   Allergen Reactions    Penicillins Unknown (comments)       Current Outpatient Medications   Medication Sig    SITagliptin (Januvia) 50 mg tablet Take 1 Tablet by mouth two (2) times a day.  simvastatin (ZOCOR) 20 mg tablet simvastatin 20 mg tablet  TAKE 1 TABLET DAILY IN THE EVENING    indapamide (LOZOL) 1.25 mg tablet TAKE 1 TABLET EVERY MORNING    metoprolol succinate (TOPROL-XL) 50 mg XL tablet metoprolol succinate ER 50 mg tablet,extended release 24 hr  TK 1 T PO QD    gabapentin (NEURONTIN) 300 mg capsule Take 2 Capsules by mouth nightly. Max Daily Amount: 600 mg.    gabapentin (NEURONTIN) 100 mg capsule TAKE 1 CAPSULE THREE TIMES A DAY    insulin glargine (Lantus Solostar U-100 Insulin) 100 unit/mL (3 mL) inpn 10 units every morning    glucose blood VI test strips (FreeStyle Lite Strips) strip FreeStyle Lite Strips  USE AS DIRECTED 2 TIMES DAILY    Eliquis 5 mg tablet     Insulin Needles, Disposable, 32 gauge x 5/32\" ndle Use UTD  E11.40: Type 2 diabetes mellitus with diabetic neuropathy, unspecified    lancets 28 gauge misc 28 Lancet by Does Not Apply route two (2) times a day. Use as Directed; E11.40: Type 2 diabetes mellitus with diabetic neuropathy, unspecified    triamcinolone acetonide (KENALOG) 0.1 % ointment Apply  to affected area two (2) times a day. use thin layer (Patient not taking: Reported on 1/6/2023)    mupirocin (BACTROBAN) 2 % ointment Apply  to affected area daily.  (Patient not taking: Reported on 1/6/2023)     No current facility-administered medications for this visit. Review of Systems   Skin:  Negative for itching and rash. Reviewed PmHx, FmHx, SocHx as well as meds and allergies, updated and dated in the chart. Objective     Visit Vitals  BP (!) 119/59 (BP 1 Location: Left upper arm, BP Patient Position: Sitting, BP Cuff Size: Adult)   Pulse 75   Temp 96.9 °F (36.1 °C) (Temporal)   Resp 20   Ht 5' 7\" (1.702 m)   Wt 182 lb 12.8 oz (82.9 kg)   SpO2 97%   BMI 28.63 kg/m²     Physical Exam  Vitals and nursing note reviewed. Constitutional:       Appearance: He is not toxic-appearing. Pulmonary:      Effort: Pulmonary effort is normal. No respiratory distress. Skin:     Comments: Keratitic lesion to right temple with pearly appearance. Neurological:      Mental Status: He is alert. Assessment and Plan     Diagnoses and all orders for this visit:    1. Skin lesion of face  Comments:  Concern for skin malignancy. Referring to Derm. Request placed to referral specialist.          As applicable:  Medication Side Effects and Warnings were discussed with patient. Patient Labs were reviewed and or requested. Patient Past Records were reviewed and or requested. I have discussed the diagnosis with the patient and the intended plan as seen in the above orders. The patient has received an after-visit summary and questions were answered concerning future plans. I have discussed medication side effects and warnings with the patient as well.       Anupam Mcgill MD  [address] [place] [address]

## 2023-01-06 NOTE — PROGRESS NOTES
Room 11     Identified pt with two pt identifiers(name and ). Reviewed record in preparation for visit and have obtained necessary documentation. All patient medications has been reviewed. Chief Complaint   Patient presents with    Skin Problem     Patient reports \"a mole on the right side of the forehead and the right side of the nose\". 3 most recent PHQ Screens 10/19/2022   Little interest or pleasure in doing things Not at all   Feeling down, depressed, irritable, or hopeless Not at all   Total Score PHQ 2 0     Abuse Screening Questionnaire 2021   Do you ever feel afraid of your partner? N   Are you in a relationship with someone who physically or mentally threatens you? N   Is it safe for you to go home? Y       Health Maintenance Due   Topic    Pneumococcal 65+ years (1 - PCV)    DTaP/Tdap/Td series (1 - Tdap)    Shingles Vaccine (1 of 2)    COVID-19 Vaccine (3 - Booster for Moderna series)    Flu Vaccine (1)    Lipid Screen      Health Maintenance Review: Patient reminded of \"due or due soon\" health maintenance. I have asked the patient to contact his/her primary care provider (PCP) for follow-up on his/her health maintenance. Vitals:    23 1159   Weight: 182 lb 12.8 oz (82.9 kg)   Height: 5' 7\" (1.702 m)   PainSc:   0 - No pain       Wt Readings from Last 3 Encounters:   23 182 lb 12.8 oz (82.9 kg)   10/19/22 183 lb (83 kg)   22 182 lb (82.6 kg)     Temp Readings from Last 3 Encounters:   10/19/22 98.1 °F (36.7 °C) (Oral)   22 97.3 °F (36.3 °C) (Temporal)   22 97.3 °F (36.3 °C) (Temporal)     BP Readings from Last 3 Encounters:   10/19/22 115/80   22 130/64   22 133/68     Pulse Readings from Last 3 Encounters:   10/19/22 72   22 61   22 77       1. \"Have you been to the ER, urgent care clinic since your last visit? Hospitalized since your last visit? \" No    2.  \"Have you seen or consulted any other health care providers outside of the [facility] since your last visit? \" No     3. For patients aged 39-70: Has the patient had a colonoscopy / FIT/ Cologuard? NA - based on age        Patient is accompanied by self I have received verbal consent from [address] [facility] [place], [place],[address] to discuss any/all medical information while they are present in the room.

## 2023-01-10 DIAGNOSIS — L98.9 SKIN LESION OF FACE: Primary | ICD-10-CM

## 2023-03-08 ENCOUNTER — OFFICE VISIT (OUTPATIENT)
Dept: PRIMARY CARE CLINIC | Age: 88
End: 2023-03-08

## 2023-03-08 VITALS
HEART RATE: 70 BPM | RESPIRATION RATE: 20 BRPM | DIASTOLIC BLOOD PRESSURE: 78 MMHG | WEIGHT: 182 LBS | SYSTOLIC BLOOD PRESSURE: 126 MMHG | TEMPERATURE: 97.4 F | HEIGHT: 67 IN | BODY MASS INDEX: 28.56 KG/M2 | OXYGEN SATURATION: 97 %

## 2023-03-08 DIAGNOSIS — E78.2 MIXED HYPERLIPIDEMIA: ICD-10-CM

## 2023-03-08 DIAGNOSIS — E11.40 TYPE 2 DIABETES MELLITUS WITH DIABETIC NEUROPATHY, UNSPECIFIED WHETHER LONG TERM INSULIN USE (HCC): ICD-10-CM

## 2023-03-08 DIAGNOSIS — I10 HYPERTENSION, UNSPECIFIED TYPE: ICD-10-CM

## 2023-03-08 DIAGNOSIS — B35.1 FUNGAL NAIL INFECTION: Primary | ICD-10-CM

## 2023-03-08 DIAGNOSIS — N18.31 STAGE 3A CHRONIC KIDNEY DISEASE (HCC): ICD-10-CM

## 2023-03-08 NOTE — PROGRESS NOTES
Clementina Hung is a 80 y.o. male presents for    Chief Complaint   Patient presents with    Toe Pain     Left foot and toes increase pain/discomfort, more at night     Penis Pain    . ASSESSMENT and PLAN  Diagnoses and all orders for this visit:    1. Fungal nail infection  Comments:  recommended to follow up with podiatrist.     2. Type 2 diabetes mellitus with diabetic neuropathy, unspecified whether long term insulin use ([facility]  Comments:  continue current regimen. recheck A1C today. Orders:  -     CBC WITH AUTOMATED DIFF  -     HEMOGLOBIN A1C WITH EAG    3. Stage 3a chronic kidney disease ([facility]  Comments:  stable  Assessment & Plan:         4. Mixed hyperlipidemia  Comments:  on statin. recheck lipids today. Orders:  -     METABOLIC PANEL, COMPREHENSIVE  -     LIPID PANEL    5. Hypertension, unspecified type  Comments:  stable on current therapy           HISTORY OF PRESENT ILLNESS  Clementina Hung is a 80 y.o. male presents for    Chief Complaint   Patient presents with    Toe Pain     Left foot and toes increase pain/discomfort, more at night     Penis Pain    . Patient reports left toe nail fungus x years. The toenail is thick and yellow. He saw a podiatrist and said he cut nail down but he \"wants to toe nail removed\". He states when the covers hit his toe at night that irritates the toe nail, denies pain. Type 2 DM: Currently on Januvia and lantus. Previous A1C 7.4. He sees podiatrist. Patient's last eye exam was unknown . Admits neuropathy, takes gabapentin for this. Patient reported that he uses gabapentin 600 mg at night and uses 100 mg during the day TID PRN. Hyperlipidemia: Compliant with statin therapy. Denies side effects from medication. Hypertension: Patients hypertension is well controlled on regimen of metoprolol, indapamide. . Denies headaches, blurred vision or dizziness.           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    Renal insufficiency N28.9    Type II diabetes mellitus ([facility] E11.9    Chronic renal disease, stage III N18.30     Patient Active Problem List    Diagnosis Date Noted    Chronic renal disease, stage III 07/27/2022    Hyperlipidemia 12/08/2020    Hypertensive disorder 12/08/2020    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 No pertinent surgical history. Family History   Problem Relation Age of Onset    Diabetes Other     Hypertension Other      Social History     Tobacco Use    Smoking status: Never    Smokeless tobacco: Never   Substance Use Topics    Alcohol use: Not Currently           Review of Systems   Constitutional: Negative. HENT: Negative. Eyes: Negative. Respiratory: Negative. Cardiovascular: Negative. Gastrointestinal: Negative. Genitourinary: Negative. Musculoskeletal: Negative. Skin:         Toenail thick   Neurological: Negative. Psychiatric/Behavioral: Negative. Physical Exam  HENT:      Head: Normocephalic. Nose: Nose normal.   Eyes:      Extraocular Movements: Extraocular movements intact. Conjunctiva/sclera: Conjunctivae normal.   Cardiovascular:      Rate and Rhythm: Normal rate and regular rhythm. Pulses: Normal pulses. Pulmonary:      Effort: Pulmonary effort is normal.   Musculoskeletal:         General: Normal range of motion. Cervical back: Normal range of motion.    Feet:      Left foot:      Toenail Condition: Left toenails are abnormally thick. Comments: abnormally thick. Fungal disease   Neurological:      Mental Status: He is alert and oriented to person, place, and time. Mental status is at baseline.              Royer Torres

## 2023-03-08 NOTE — PROGRESS NOTES
Identified Patient with two Patient identifiers(name and ). 1. \"Have you been to the ER, urgent care clinic since your last visit? Hospitalized since your last visit? \" No    2. \"Have you seen or consulted any other health care providers outside of the [address] [place] [address] since your last visit? \" No     3. For patients aged 39-70: Has the patient had a colonoscopy / FIT/ Cologuard? NA - based on age      If the patient is female:    4. For patients aged 41-77: Has the patient had a mammogram within the past 2 years? NA - based on age or sex      11. For patients aged 21-65: Has the patient had a pap smear?  NA - based on age or sex     Visit Vitals  /78 (BP 1 Location: Left upper arm, BP Patient Position: Sitting, BP Cuff Size: Large adult)   Pulse 70   Temp 97.4 °F (36.3 °C) (Temporal)   Resp 20   Ht 5' 7\" (1.702 m)   Wt 182 lb (82.6 kg)   SpO2 97%   BMI 28.51 kg/m²       Chief Complaint   Patient presents with    Toe Pain     Left foot and toes increase pain/discomfort, more at night     Penis Pain       Health Maintenance Due   Topic Date Due    Pneumococcal 65+ years (1 - PCV) Never done    DTaP/Tdap/Td series (1 - Tdap) Never done    Shingles Vaccine (1 of 2) Never done    COVID-19 Vaccine (3 - Booster for Moderna series) 2021    Flu Vaccine (1) Never done    Lipid Screen  2022

## 2023-03-09 LAB
ALBUMIN SERPL-MCNC: 4 G/DL (ref 3.5–4.6)
ALBUMIN/GLOB SERPL: 1.6 {RATIO} (ref 1.2–2.2)
ALP SERPL-CCNC: 76 IU/L (ref 44–121)
ALT SERPL-CCNC: 10 IU/L (ref 0–44)
AST SERPL-CCNC: 17 IU/L (ref 0–40)
BASOPHILS # BLD AUTO: 0 X10E3/UL (ref 0–0.2)
BASOPHILS NFR BLD AUTO: 0 %
BILIRUB SERPL-MCNC: 0.6 MG/DL (ref 0–1.2)
BUN SERPL-MCNC: 17 MG/DL (ref 10–36)
BUN/CREAT SERPL: 13 (ref 10–24)
CALCIUM SERPL-MCNC: 9.1 MG/DL (ref 8.6–10.2)
CHLORIDE SERPL-SCNC: 97 MMOL/L (ref 96–106)
CHOLEST SERPL-MCNC: 134 MG/DL (ref 100–199)
CO2 SERPL-SCNC: 29 MMOL/L (ref 20–29)
CREAT SERPL-MCNC: 1.34 MG/DL (ref 0.76–1.27)
EGFRCR SERPLBLD CKD-EPI 2021: 48 ML/MIN/1.73
EOSINOPHIL # BLD AUTO: 0.1 X10E3/UL (ref 0–0.4)
EOSINOPHIL NFR BLD AUTO: 2 %
ERYTHROCYTE [DISTWIDTH] IN BLOOD BY AUTOMATED COUNT: 12.3 % (ref 11.6–15.4)
EST. AVERAGE GLUCOSE BLD GHB EST-MCNC: 166 MG/DL
GLOBULIN SER CALC-MCNC: 2.5 G/DL (ref 1.5–4.5)
GLUCOSE SERPL-MCNC: 206 MG/DL (ref 70–99)
HBA1C MFR BLD: 7.4 % (ref 4.8–5.6)
HCT VFR BLD AUTO: 43 % (ref 37.5–51)
HDLC SERPL-MCNC: 47 MG/DL
HGB BLD-MCNC: 14.1 G/DL (ref 13–17.7)
IMM GRANULOCYTES # BLD AUTO: 0.1 X10E3/UL (ref 0–0.1)
IMM GRANULOCYTES NFR BLD AUTO: 1 %
LDLC SERPL CALC-MCNC: 59 MG/DL (ref 0–99)
LYMPHOCYTES # BLD AUTO: 1.3 X10E3/UL (ref 0.7–3.1)
LYMPHOCYTES NFR BLD AUTO: 19 %
MCH RBC QN AUTO: 29.2 PG (ref 26.6–33)
MCHC RBC AUTO-ENTMCNC: 32.8 G/DL (ref 31.5–35.7)
MCV RBC AUTO: 89 FL (ref 79–97)
MONOCYTES # BLD AUTO: 0.5 X10E3/UL (ref 0.1–0.9)
MONOCYTES NFR BLD AUTO: 7 %
NEUTROPHILS # BLD AUTO: 4.9 X10E3/UL (ref 1.4–7)
NEUTROPHILS NFR BLD AUTO: 71 %
PLATELET # BLD AUTO: 140 X10E3/UL (ref 150–450)
POTASSIUM SERPL-SCNC: 3.9 MMOL/L (ref 3.5–5.2)
PROT SERPL-MCNC: 6.5 G/DL (ref 6–8.5)
RBC # BLD AUTO: 4.83 X10E6/UL (ref 4.14–5.8)
SODIUM SERPL-SCNC: 137 MMOL/L (ref 134–144)
TRIGL SERPL-MCNC: 169 MG/DL (ref 0–149)
VLDLC SERPL CALC-MCNC: 28 MG/DL (ref 5–40)
WBC # BLD AUTO: 6.8 X10E3/UL (ref 3.4–10.8)

## 2023-03-29 ENCOUNTER — OFFICE VISIT (OUTPATIENT)
Dept: PRIMARY CARE CLINIC | Age: 88
End: 2023-03-29
Payer: MEDICARE

## 2023-03-29 VITALS
HEART RATE: 59 BPM | TEMPERATURE: 97.7 F | DIASTOLIC BLOOD PRESSURE: 56 MMHG | RESPIRATION RATE: 20 BRPM | BODY MASS INDEX: 27.91 KG/M2 | HEIGHT: 67 IN | OXYGEN SATURATION: 99 % | WEIGHT: 177.8 LBS | SYSTOLIC BLOOD PRESSURE: 100 MMHG

## 2023-03-29 DIAGNOSIS — B35.1 FUNGAL NAIL INFECTION: ICD-10-CM

## 2023-03-29 DIAGNOSIS — N48.89 PENIS PAIN: Primary | ICD-10-CM

## 2023-03-29 PROCEDURE — 1123F ACP DISCUSS/DSCN MKR DOCD: CPT | Performed by: NURSE PRACTITIONER

## 2023-03-29 PROCEDURE — 99213 OFFICE O/P EST LOW 20 MIN: CPT | Performed by: NURSE PRACTITIONER

## 2023-03-29 NOTE — PROGRESS NOTES
Room 8     Identified pt with two pt identifiers(name and ). Reviewed record in preparation for visit and have obtained necessary documentation. All patient medications has been reviewed. Chief Complaint   Patient presents with    Other     Patient c/o skin around his penis feeling tight and painful    Nail Problem     Patient c/o toe nail on the big toe /left foot \"rotting\"       3 most recent PHQ Screens 3/29/2023   Little interest or pleasure in doing things Not at all   Feeling down, depressed, irritable, or hopeless Not at all   Total Score PHQ 2 0     Abuse Screening Questionnaire 2021   Do you ever feel afraid of your partner? N   Are you in a relationship with someone who physically or mentally threatens you? N   Is it safe for you to go home? Y       Health Maintenance Due   Topic    Pneumococcal 65+ years (1 - PCV)    DTaP/Tdap/Td series (1 - Tdap)    Shingles Vaccine (1 of 2)    COVID-19 Vaccine (3 - Booster for Moderna series)    Flu Vaccine (1)     Health Maintenance Review: Patient reminded of \"due or due soon\" health maintenance. I have asked the patient to contact his/her primary care provider (PCP) for follow-up on his/her health maintenance. Vitals:    23 1506   BP: (!) 100/56   Pulse: (!) 59   Resp: 20   Temp: 97.7 °F (36.5 °C)   TempSrc: Oral   SpO2: 99%   Weight: 177 lb 12.8 oz (80.6 kg)   Height: 5' 7\" (1.702 m)   PainSc:   0 - No pain       Wt Readings from Last 3 Encounters:   23 177 lb 12.8 oz (80.6 kg)   23 182 lb (82.6 kg)   23 182 lb 12.8 oz (82.9 kg)     Temp Readings from Last 3 Encounters:   23 97.7 °F (36.5 °C) (Oral)   23 97.4 °F (36.3 °C) (Temporal)   23 96.9 °F (36.1 °C) (Temporal)     BP Readings from Last 3 Encounters:   23 (!) 100/56   23 126/78   23 (!) 119/59     Pulse Readings from Last 3 Encounters:   23 (!) 59   23 70   23 75       1.  \"Have you been to the ER, urgent care clinic since your last visit? Hospitalized since your last visit? \" No    2. \"Have you seen or consulted any other health care providers outside of the [address] [place] [address] since your last visit? \" No     3. For patients aged 39-70: Has the patient had a colonoscopy / FIT/ Cologuard? NA - based on age        Patient is accompanied by self I have received verbal consent from [address] [facility] to discuss any/all medical information while they are present in the room.

## 2023-03-29 NOTE — PROGRESS NOTES
Geronimo Guerrero is a 80 y.o. male presents for    Chief Complaint   Patient presents with    Other     Patient c/o skin around his penis feeling tight and painful    Nail Problem     Patient c/o toe nail on the big toe /left foot \"rotting\"    . ASSESSMENT and PLAN  Diagnoses and all orders for this visit:    1. Penis pain  Comments:  referral to urology  Orders:  -     REFERRAL TO UROLOGY    2. Fungal nail infection  Comments:  ongoing issue. follow up with podiatry. HISTORY OF PRESENT ILLNESS  Geronimo Guerrero is a 80 y.o. male presents for    Chief Complaint   Patient presents with    Other     Patient c/o skin around his penis feeling tight and painful    Nail Problem     Patient c/o toe nail on the big toe /left foot \"rotting\"    . Patient states the skin around his penis feels tight and he feels like his penis is \"shrinking up\". He states when he urinates the urine \"shoots out everywhere\". Denies burning or blood in his urine. When he pulls the skin of his penis back it burns and is red and painful. He states one day he noticed a lot of white around his penis. This has been an ongoing issue for >6 months. Patient reports fungus to left big toe x years. Denies pain. Has a podiatrist, but does not see him.      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    Renal insufficiency N28.9    Type II diabetes mellitus (HCC) E11.9    Chronic renal disease, stage III N18.30     Patient Active Problem List    Diagnosis Date Noted    Chronic renal disease, stage III 07/27/2022    Hyperlipidemia 12/08/2020    Hypertensive disorder 12/08/2020    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 180 Tablet 1    simvastatin (ZOCOR) 20 mg tablet simvastatin 20 mg tablet  TAKE 1 TABLET DAILY IN THE EVENING 90 Tablet 1    indapamide (LOZOL) 1.25 mg tablet TAKE 1 TABLET EVERY MORNING 90 Tablet 1    metoprolol succinate (TOPROL-XL) 50 mg XL tablet metoprolol succinate ER 50 mg tablet,extended release 24 hr  TK 1 T PO QD 90 Tablet 1    gabapentin (NEURONTIN) 300 mg capsule Take 2 Capsules by mouth nightly. Max Daily Amount: 600 mg. 180 Capsule 1    gabapentin (NEURONTIN) 100 mg capsule TAKE 1 CAPSULE THREE TIMES A  Capsule 1    insulin glargine (Lantus Solostar U-100 Insulin) 100 unit/mL (3 mL) inpn 10 units every morning 3 Pen 1    glucose blood VI test strips (FreeStyle Lite Strips) strip FreeStyle Lite Strips  USE AS DIRECTED 2 TIMES DAILY 200 Strip 3    Eliquis 5 mg tablet       lancets 28 gauge misc 28 Lancet by Does Not Apply route two (2) times a day. Use as Directed; E11.40: Type 2 diabetes mellitus with diabetic neuropathy, unspecified 100 Lancet 4      Allergies   Allergen Reactions    Penicillins Unknown (comments)     Past Medical History:   Diagnosis Date    Diabetes ([facility]     Hypercholesterolemia     Hypertension      History reviewed. No pertinent surgical history.   Family History   Problem Relation Age of Onset    Diabetes Other     Hypertension Other      Social History     Tobacco Use    Smoking status: Never    Smokeless tobacco: Never   Substance Use Topics    Alcohol use: Not Currently           Review of Systems Constitutional: Negative. HENT: Negative. Eyes: Negative. Respiratory: Negative. Cardiovascular: Negative. Gastrointestinal: Negative. Genitourinary: Negative. Musculoskeletal: Negative. Skin: Negative. Neurological: Negative. Psychiatric/Behavioral: Negative. Physical Exam  Exam conducted with a chaperone present. Constitutional:       Appearance: Normal appearance. HENT:      Head: Normocephalic. Cardiovascular:      Rate and Rhythm: Normal rate and regular rhythm. Pulmonary:      Effort: Pulmonary effort is normal.      Breath sounds: Normal breath sounds. Abdominal:      General: Bowel sounds are normal.   Genitourinary:     Penis: Erythema and tenderness present. Comments: When foreskin pulled back, redness and irritation noted. Feet:      Right foot:      Toenail Condition: Right toenails are abnormally thick. Fungal disease present. Neurological:      Mental Status: He is alert and oriented to person, place, and time.    Psychiatric:         Mood and Affect: Mood normal.         Behavior: Behavior normal.             JACOBO Day

## 2023-04-19 ENCOUNTER — OFFICE VISIT (OUTPATIENT)
Dept: UROLOGY | Age: 88
End: 2023-04-19
Payer: MEDICARE

## 2023-04-19 VITALS
HEART RATE: 69 BPM | HEIGHT: 67 IN | DIASTOLIC BLOOD PRESSURE: 77 MMHG | BODY MASS INDEX: 26.84 KG/M2 | SYSTOLIC BLOOD PRESSURE: 136 MMHG | TEMPERATURE: 97.8 F | WEIGHT: 171 LBS | OXYGEN SATURATION: 100 %

## 2023-04-19 DIAGNOSIS — N50.89 PAIN OF MALE GENITALIA: Primary | ICD-10-CM

## 2023-04-19 DIAGNOSIS — N47.1 PHIMOSIS OF PENIS: ICD-10-CM

## 2023-04-19 DIAGNOSIS — L60.8 ACQUIRED DEFORMITY OF TOENAIL: ICD-10-CM

## 2023-04-19 DIAGNOSIS — N48.83 ACQUIRED BURIED PENIS: ICD-10-CM

## 2023-04-19 PROCEDURE — 1123F ACP DISCUSS/DSCN MKR DOCD: CPT | Performed by: UROLOGY

## 2023-04-19 PROCEDURE — G8427 DOCREV CUR MEDS BY ELIG CLIN: HCPCS | Performed by: UROLOGY

## 2023-04-19 PROCEDURE — G8536 NO DOC ELDER MAL SCRN: HCPCS | Performed by: UROLOGY

## 2023-04-19 PROCEDURE — 1101F PT FALLS ASSESS-DOCD LE1/YR: CPT | Performed by: UROLOGY

## 2023-04-19 PROCEDURE — G8417 CALC BMI ABV UP PARAM F/U: HCPCS | Performed by: UROLOGY

## 2023-04-19 PROCEDURE — G8432 DEP SCR NOT DOC, RNG: HCPCS | Performed by: UROLOGY

## 2023-04-19 PROCEDURE — 99214 OFFICE O/P EST MOD 30 MIN: CPT | Performed by: UROLOGY

## 2023-04-19 NOTE — PROGRESS NOTES
HISTORY OF PRESENT ILLNESS  Magdiel Perez is a 80 y.o. male. has a past medical history of Diabetes ([address] Hypercholesterolemia, and Hypertension. has no past surgical history on file. Chief Complaint   Patient presents with    New Patient    Penis Pain     The patient is a 27-year-old man referred by Renetta Hayes NP for genital pain. This past medical includes diabetes with neuropathy, and hypertension. He finds his penis is shrinking. It started 3-4 months ago. He has a narrow opening. His stream sprays. He cannot pull his foreskin back without pain. He has a \"decayed toe\" on his left foot. It is the big toe. He had a visit with a doctor who trimmed the nail. IPSS 7    Acute Diagnoses Addressed Today       Pain of male genitalia    -  Primary        Relevant Orders        AMB POC URINALYSIS DIP STICK AUTO W/O MICRO    Phimosis of penis        Acquired buried penis        Acquired deformity of toenail            Relevant Orders        REFERRAL TO PODIATRY            Past Medical History:    PMHx (including negatives):  has a past medical history of Diabetes ([facility] [address] Hypercholesterolemia, and Hypertension. PSurgHx:  has no past surgical history on file. PSocHx:  reports that he has never smoked. He has never used smokeless tobacco. He reports that he does not currently use alcohol. He reports that he does not use drugs. FamilyHX:   Family History   Problem Relation Age of Onset    Diabetes Other     Hypertension Other      Review of Systems   Respiratory:  Positive for shortness of breath (he hurt his ribs after a fall). All other systems reviewed and are negative. Physical Exam  Constitutional:       General: He is not in acute distress. Appearance: Normal appearance. HENT:      Head: Normocephalic and atraumatic. Eyes:      Extraocular Movements: Extraocular movements intact. Pupils: Pupils are equal, round, and reactive to light.    Cardiovascular:      Rate and Rhythm: Normal rate and regular rhythm. Pulmonary:      Effort: Pulmonary effort is normal. No respiratory distress. Breath sounds: Normal breath sounds. No wheezing or rhonchi. Abdominal:      Tenderness: There is no abdominal tenderness. There is no right CVA tenderness, left CVA tenderness, guarding or rebound. Hernia: No hernia is present. Genitourinary:     Penis: Phimosis (dime sized stiff opening. buried phallus) present. No hypospadias or tenderness. Testes: Normal.         Right: Mass, tenderness or swelling not present. Left: Mass, tenderness or swelling not present. Epididymis:      Right: Normal. No mass or tenderness. Left: Normal. No mass or tenderness. Comments: Buried phallus due to redundant suprapubic fat pad. Musculoskeletal:         General: No swelling or deformity. Normal range of motion. Comments: Left great toes mild erythema with hypertrophic nail    Lymphadenopathy:      Cervical: No cervical adenopathy. Skin:     General: Skin is warm and dry. Neurological:      General: No focal deficit present. Mental Status: He is alert and oriented to person, place, and time.    Psychiatric:         Mood and Affect: Mood normal.         Behavior: Behavior normal.     Allergies   Allergen Reactions    Penicillins Unknown (comments)     Current Outpatient Medications   Medication Sig Dispense Refill    gabapentin (NEURONTIN) 100 mg capsule TAKE 1 CAPSULE THREE TIMES A  Capsule 1    gabapentin (NEURONTIN) 300 mg capsule TAKE 2 CAPSULES NIGHTLY 180 Capsule 1    indapamide (LOZOL) 1.25 mg tablet TAKE 1 TABLET EVERY MORNING 90 Tablet 3    metoprolol succinate (TOPROL-XL) 50 mg XL tablet TAKE 1 TABLET DAILY 90 Tablet 3    Januvia 50 mg tablet TAKE 1 TABLET TWICE A  Tablet 3    Insulin Needles, Disposable, (Sure Comfort Pen Needle) 32 gauge x 5/32\" ndle USE AS DIRECTED FOR TYPE 2 DIABETES MELLITUS WITH DIABETIC NEUROPATHY, 100 Pen Needle 1    simvastatin (ZOCOR) 20 mg tablet simvastatin 20 mg tablet  TAKE 1 TABLET DAILY IN THE EVENING 90 Tablet 1    insulin glargine (Lantus Solostar U-100 Insulin) 100 unit/mL (3 mL) inpn 10 units every morning 3 Pen 1    glucose blood VI test strips (FreeStyle Lite Strips) strip FreeStyle Lite Strips  USE AS DIRECTED 2 TIMES DAILY 200 Strip 3    Eliquis 5 mg tablet       lancets 28 gauge misc 28 Lancet by Does Not Apply route two (2) times a day. Use as Directed; E11.40: Type 2 diabetes mellitus with diabetic neuropathy, unspecified 100 Lancet 4    mupirocin (BACTROBAN) 2 % ointment Apply  to affected area daily. (Patient not taking: Reported on 1/6/2023) 22 g 0      Results for orders placed or performed in visit on 03/08/23   CBC WITH AUTOMATED DIFF   Result Value Ref Range    WBC 6.8 3.4 - 10.8 x10E3/uL    RBC 4.83 4.14 - 5.80 x10E6/uL    HGB 14.1 13.0 - 17.7 g/dL    HCT 43.0 37.5 - 51.0 %    MCV 89 79 - 97 fL    MCH 29.2 26.6 - 33.0 pg    MCHC 32.8 31.5 - 35.7 g/dL    RDW 12.3 11.6 - 15.4 %    PLATELET 532 (L) 720 - 450 x10E3/uL    NEUTROPHILS 71 Not Estab. %    Lymphocytes 19 Not Estab. %    MONOCYTES 7 Not Estab. %    EOSINOPHILS 2 Not Estab. %    BASOPHILS 0 Not Estab. %    ABS. NEUTROPHILS 4.9 1.4 - 7.0 x10E3/uL    Abs Lymphocytes 1.3 0.7 - 3.1 x10E3/uL    ABS. MONOCYTES 0.5 0.1 - 0.9 x10E3/uL    ABS. EOSINOPHILS 0.1 0.0 - 0.4 x10E3/uL    ABS. BASOPHILS 0.0 0.0 - 0.2 x10E3/uL    IMMATURE GRANULOCYTES 1 Not Estab. %    ABS. IMM.  GRANS. 0.1 0.0 - 0.1 Q32Z3/   METABOLIC PANEL, COMPREHENSIVE   Result Value Ref Range    Glucose 206 (H) 70 - 99 mg/dL    BUN 17 10 - 36 mg/dL    Creatinine 1.34 (H) 0.76 - 1.27 mg/dL    eGFR 48 (L) >59 mL/min/1.73    BUN/Creatinine ratio 13 10 - 24    Sodium 137 134 - 144 mmol/L    Potassium 3.9 3.5 - 5.2 mmol/L    Chloride 97 96 - 106 mmol/L    CO2 29 20 - 29 mmol/L    Calcium 9.1 8.6 - 10.2 mg/dL    Protein, total 6.5 6.0 - 8.5 g/dL    Albumin 4.0 3.5 - 4.6 g/dL    GLOBULIN, TOTAL 2.5 1.5 - 4.5 g/dL    A-G Ratio 1.6 1.2 - 2.2    Bilirubin, total 0.6 0.0 - 1.2 mg/dL    Alk. phosphatase 76 44 - 121 IU/L    AST (SGOT) 17 0 - 40 IU/L    ALT (SGPT) 10 0 - 44 IU/L   LIPID PANEL   Result Value Ref Range    Cholesterol, total 134 100 - 199 mg/dL    Triglyceride 169 (H) 0 - 149 mg/dL    HDL Cholesterol 47 >39 mg/dL    VLDL, calculated 28 5 - 40 mg/dL    LDL, calculated 59 0 - 99 mg/dL   HEMOGLOBIN A1C WITH EAG   Result Value Ref Range    Hemoglobin A1c 7.4 (H) 4.8 - 5.6 %    Estimated average glucose 166 mg/dL       ASSESSMENT and PLAN  Diagnoses and all orders for this visit:    1. Pain of male genitalia  -     AMB POC URINALYSIS DIP STICK AUTO W/O MICRO    2. Phimosis of penis    3. Acquired buried penis    4. Acquired deformity of toenail  -     REFERRAL TO PODIATRY     Phimosis. I recommend a dorsal slit. Circumcision more likely to fail due to buried phallus. Postop care with topical ointment is important. DM and glucosuria increases risks of recurrent scarring. The patient was counseled on the risks, benefits and expected course of the procedure. The patient wished to proceed. Follow-up and Dispositions    Return for Surgery to be scheduled. Nakia Trevino MD       Please note that portions of this note was potentially completed with Dragon dictation, the computer voice recognition software. Quite often unanticipated grammatical, syntax, homophones, and other interpretive errors are inadvertently transcribed by the computer software. Please disregard these errors. Please excuse any errors that have escaped final proofreading. Thank you.

## 2023-04-19 NOTE — PROGRESS NOTES
Chief Complaint   Patient presents with    New Patient    Penis Pain     1. Have you been to the ER, urgent care clinic since your last visit? Hospitalized since your last visit? No    2. Have you seen or consulted any other health care providers outside of the [address] [place] [address] since your last visit? Include any pap smears or colon screening.  No  Visit Vitals  /77 (BP 1 Location: Right upper arm, BP Patient Position: Sitting, BP Cuff Size: Adult)   Pulse 69   Temp 97.8 °F (36.6 °C) (Temporal)   Ht 5' 7\" (1.702 m)   Wt 171 lb (77.6 kg)   SpO2 100%   BMI 26.78 kg/m²

## 2023-05-12 ENCOUNTER — TELEPHONE (OUTPATIENT)
Age: 88
End: 2023-05-12

## 2023-05-12 NOTE — TELEPHONE ENCOUNTER
Daughter stated patient feels things have gotten better & would like to have another office visit with Dr. Tino Zaidi prior to scheduling the surgery. The daughter is going to call back on Monday to schedule.

## 2023-05-24 RX ORDER — GABAPENTIN 300 MG/1
CAPSULE ORAL
COMMUNITY
Start: 2023-04-11

## 2023-05-24 RX ORDER — METOPROLOL SUCCINATE 50 MG/1
1 TABLET, EXTENDED RELEASE ORAL DAILY
COMMUNITY
Start: 2023-04-11

## 2023-05-24 RX ORDER — GABAPENTIN 100 MG/1
CAPSULE ORAL
COMMUNITY
Start: 2023-04-11

## 2023-05-24 RX ORDER — INDAPAMIDE 1.25 MG/1
1 TABLET, FILM COATED ORAL EVERY MORNING
COMMUNITY
Start: 2023-04-11

## 2023-05-24 RX ORDER — INSULIN GLARGINE 100 [IU]/ML
INJECTION, SOLUTION SUBCUTANEOUS
COMMUNITY
Start: 2022-04-27

## 2023-05-24 RX ORDER — SIMVASTATIN 20 MG
TABLET ORAL
COMMUNITY
Start: 2022-10-19

## 2023-05-30 RX ORDER — BLOOD-GLUCOSE METER
KIT MISCELLANEOUS
Qty: 200 STRIP | Refills: 3 | Status: SHIPPED | OUTPATIENT
Start: 2023-05-30

## 2023-08-17 RX ORDER — INSULIN GLARGINE 100 [IU]/ML
INJECTION, SOLUTION SUBCUTANEOUS
Qty: 45 ML | Refills: 2 | Status: SHIPPED | OUTPATIENT
Start: 2023-08-17

## 2023-08-28 ENCOUNTER — OFFICE VISIT (OUTPATIENT)
Dept: PRIMARY CARE CLINIC | Facility: CLINIC | Age: 88
End: 2023-08-28
Payer: MEDICARE

## 2023-08-28 VITALS
HEART RATE: 59 BPM | HEIGHT: 67 IN | DIASTOLIC BLOOD PRESSURE: 68 MMHG | SYSTOLIC BLOOD PRESSURE: 110 MMHG | BODY MASS INDEX: 28.03 KG/M2 | WEIGHT: 178.6 LBS | TEMPERATURE: 97.7 F

## 2023-08-28 DIAGNOSIS — E78.2 MIXED HYPERLIPIDEMIA: ICD-10-CM

## 2023-08-28 DIAGNOSIS — Z00.00 MEDICARE ANNUAL WELLNESS VISIT, SUBSEQUENT: Primary | ICD-10-CM

## 2023-08-28 DIAGNOSIS — I10 ESSENTIAL (PRIMARY) HYPERTENSION: ICD-10-CM

## 2023-08-28 DIAGNOSIS — E11.40 TYPE 2 DIABETES MELLITUS WITH DIABETIC NEUROPATHY, WITHOUT LONG-TERM CURRENT USE OF INSULIN (HCC): Chronic | ICD-10-CM

## 2023-08-28 DIAGNOSIS — I48.0 PAROXYSMAL ATRIAL FIBRILLATION (HCC): ICD-10-CM

## 2023-08-28 PROCEDURE — G0439 PPPS, SUBSEQ VISIT: HCPCS | Performed by: NURSE PRACTITIONER

## 2023-08-28 PROCEDURE — G8419 CALC BMI OUT NRM PARAM NOF/U: HCPCS | Performed by: NURSE PRACTITIONER

## 2023-08-28 PROCEDURE — 1036F TOBACCO NON-USER: CPT | Performed by: NURSE PRACTITIONER

## 2023-08-28 PROCEDURE — 3051F HG A1C>EQUAL 7.0%<8.0%: CPT | Performed by: NURSE PRACTITIONER

## 2023-08-28 PROCEDURE — G8427 DOCREV CUR MEDS BY ELIG CLIN: HCPCS | Performed by: NURSE PRACTITIONER

## 2023-08-28 PROCEDURE — 1123F ACP DISCUSS/DSCN MKR DOCD: CPT | Performed by: NURSE PRACTITIONER

## 2023-08-28 PROCEDURE — 99214 OFFICE O/P EST MOD 30 MIN: CPT | Performed by: NURSE PRACTITIONER

## 2023-08-28 RX ORDER — INDAPAMIDE 1.25 MG/1
1.25 TABLET ORAL EVERY MORNING
Qty: 90 TABLET | Refills: 1 | Status: SHIPPED | OUTPATIENT
Start: 2023-08-28

## 2023-08-28 SDOH — ECONOMIC STABILITY: FOOD INSECURITY: WITHIN THE PAST 12 MONTHS, THE FOOD YOU BOUGHT JUST DIDN'T LAST AND YOU DIDN'T HAVE MONEY TO GET MORE.: NEVER TRUE

## 2023-08-28 SDOH — ECONOMIC STABILITY: HOUSING INSECURITY
IN THE LAST 12 MONTHS, WAS THERE A TIME WHEN YOU DID NOT HAVE A STEADY PLACE TO SLEEP OR SLEPT IN A SHELTER (INCLUDING NOW)?: NO

## 2023-08-28 SDOH — ECONOMIC STABILITY: FOOD INSECURITY: WITHIN THE PAST 12 MONTHS, YOU WORRIED THAT YOUR FOOD WOULD RUN OUT BEFORE YOU GOT MONEY TO BUY MORE.: NEVER TRUE

## 2023-08-28 SDOH — ECONOMIC STABILITY: INCOME INSECURITY: HOW HARD IS IT FOR YOU TO PAY FOR THE VERY BASICS LIKE FOOD, HOUSING, MEDICAL CARE, AND HEATING?: NOT HARD AT ALL

## 2023-08-28 ASSESSMENT — LIFESTYLE VARIABLES
HOW MANY STANDARD DRINKS CONTAINING ALCOHOL DO YOU HAVE ON A TYPICAL DAY: PATIENT DOES NOT DRINK
HOW OFTEN DO YOU HAVE A DRINK CONTAINING ALCOHOL: NEVER

## 2023-08-28 ASSESSMENT — ENCOUNTER SYMPTOMS: SHORTNESS OF BREATH: 0

## 2023-08-28 ASSESSMENT — PATIENT HEALTH QUESTIONNAIRE - PHQ9
SUM OF ALL RESPONSES TO PHQ QUESTIONS 1-9: 2
SUM OF ALL RESPONSES TO PHQ9 QUESTIONS 1 & 2: 2
2. FEELING DOWN, DEPRESSED OR HOPELESS: 1
1. LITTLE INTEREST OR PLEASURE IN DOING THINGS: 1

## 2023-08-29 LAB
ALBUMIN SERPL-MCNC: 3.6 G/DL (ref 3.6–4.6)
ALBUMIN/GLOB SERPL: 1.4 {RATIO} (ref 1.2–2.2)
ALP SERPL-CCNC: 74 IU/L (ref 44–121)
ALT SERPL-CCNC: 10 IU/L (ref 0–44)
AST SERPL-CCNC: 21 IU/L (ref 0–40)
BILIRUB SERPL-MCNC: 0.6 MG/DL (ref 0–1.2)
BUN SERPL-MCNC: 13 MG/DL (ref 10–36)
BUN/CREAT SERPL: 11 (ref 10–24)
CALCIUM SERPL-MCNC: 8.7 MG/DL (ref 8.6–10.2)
CHLORIDE SERPL-SCNC: 98 MMOL/L (ref 96–106)
CHOLEST SERPL-MCNC: 144 MG/DL (ref 100–199)
CO2 SERPL-SCNC: 25 MMOL/L (ref 20–29)
CREAT SERPL-MCNC: 1.14 MG/DL (ref 0.76–1.27)
EGFRCR SERPLBLD CKD-EPI 2021: 58 ML/MIN/1.73
ERYTHROCYTE [DISTWIDTH] IN BLOOD BY AUTOMATED COUNT: 12.8 % (ref 11.6–15.4)
GLOBULIN SER CALC-MCNC: 2.5 G/DL (ref 1.5–4.5)
GLUCOSE SERPL-MCNC: 155 MG/DL (ref 70–99)
HBA1C MFR BLD: 7 % (ref 4.8–5.6)
HCT VFR BLD AUTO: 42.4 % (ref 37.5–51)
HDLC SERPL-MCNC: 53 MG/DL
HGB BLD-MCNC: 13.8 G/DL (ref 13–17.7)
LDLC SERPL CALC-MCNC: 57 MG/DL (ref 0–99)
MCH RBC QN AUTO: 28.9 PG (ref 26.6–33)
MCHC RBC AUTO-ENTMCNC: 32.5 G/DL (ref 31.5–35.7)
MCV RBC AUTO: 89 FL (ref 79–97)
PLATELET # BLD AUTO: 143 X10E3/UL (ref 150–450)
POTASSIUM SERPL-SCNC: 4.1 MMOL/L (ref 3.5–5.2)
PROT SERPL-MCNC: 6.1 G/DL (ref 6–8.5)
RBC # BLD AUTO: 4.77 X10E6/UL (ref 4.14–5.8)
SODIUM SERPL-SCNC: 140 MMOL/L (ref 134–144)
TRIGL SERPL-MCNC: 211 MG/DL (ref 0–149)
VLDLC SERPL CALC-MCNC: 34 MG/DL (ref 5–40)
WBC # BLD AUTO: 6.6 X10E3/UL (ref 3.4–10.8)

## 2023-09-05 RX ORDER — SIMVASTATIN 20 MG
TABLET ORAL
Qty: 90 TABLET | Refills: 3 | Status: SHIPPED | OUTPATIENT
Start: 2023-09-05

## 2023-11-13 DIAGNOSIS — E11.40 TYPE 2 DIABETES MELLITUS WITH DIABETIC NEUROPATHY, WITHOUT LONG-TERM CURRENT USE OF INSULIN (HCC): Primary | ICD-10-CM

## 2023-11-13 RX ORDER — GABAPENTIN 100 MG/1
CAPSULE ORAL
Qty: 270 CAPSULE | Refills: 1 | Status: SHIPPED | OUTPATIENT
Start: 2023-11-13 | End: 2024-05-11

## 2023-11-20 RX ORDER — PEN NEEDLE, DIABETIC 32GX 5/32"
NEEDLE, DISPOSABLE MISCELLANEOUS
Qty: 100 EACH | Refills: 11 | Status: SHIPPED | OUTPATIENT
Start: 2023-11-20

## 2024-01-01 ENCOUNTER — HOSPITAL ENCOUNTER (INPATIENT)
Facility: HOSPITAL | Age: 89
LOS: 2 days | Discharge: HOSPICE | End: 2024-07-15
Attending: FAMILY MEDICINE | Admitting: FAMILY MEDICINE

## 2024-01-01 ENCOUNTER — HOSPICE ADMISSION (OUTPATIENT)
Age: 89
End: 2024-01-01
Payer: MEDICARE

## 2024-01-01 VITALS
DIASTOLIC BLOOD PRESSURE: 53 MMHG | TEMPERATURE: 97.5 F | RESPIRATION RATE: 16 BRPM | OXYGEN SATURATION: 98 % | HEART RATE: 50 BPM | SYSTOLIC BLOOD PRESSURE: 127 MMHG

## 2024-01-01 PROCEDURE — 2500000003 HC RX 250 WO HCPCS

## 2024-01-01 PROCEDURE — 0656 HSPC GENERAL INPATIENT

## 2024-01-01 PROCEDURE — 94761 N-INVAS EAR/PLS OXIMETRY MLT: CPT

## 2024-01-01 PROCEDURE — 1100000000 HC RM PRIVATE

## 2024-01-01 PROCEDURE — 0651 HSPC ROUTINE HOME CARE

## 2024-01-01 PROCEDURE — 6360000002 HC RX W HCPCS

## 2024-01-01 PROCEDURE — 99223 1ST HOSP IP/OBS HIGH 75: CPT | Performed by: PHYSICAL MEDICINE & REHABILITATION

## 2024-01-01 PROCEDURE — 2500000003 HC RX 250 WO HCPCS: Performed by: PHYSICAL MEDICINE & REHABILITATION

## 2024-01-01 RX ORDER — DIAZEPAM 5 MG/ML
5 INJECTION, SOLUTION INTRAMUSCULAR; INTRAVENOUS EVERY 4 HOURS
Status: DISCONTINUED | OUTPATIENT
Start: 2024-01-01 | End: 2024-01-01

## 2024-01-01 RX ORDER — HYDROMORPHONE HYDROCHLORIDE 1 MG/ML
1 INJECTION, SOLUTION INTRAMUSCULAR; INTRAVENOUS; SUBCUTANEOUS EVERY 4 HOURS
Status: DISCONTINUED | OUTPATIENT
Start: 2024-01-01 | End: 2024-01-01

## 2024-01-01 RX ORDER — MIDAZOLAM HYDROCHLORIDE 1 MG/ML
2 INJECTION INTRAMUSCULAR; INTRAVENOUS
Status: DISCONTINUED | OUTPATIENT
Start: 2024-01-01 | End: 2024-01-01 | Stop reason: HOSPADM

## 2024-01-01 RX ORDER — BISACODYL 10 MG
10 SUPPOSITORY, RECTAL RECTAL DAILY PRN
Status: DISCONTINUED | OUTPATIENT
Start: 2024-01-01 | End: 2024-01-01 | Stop reason: HOSPADM

## 2024-01-01 RX ORDER — HYDROMORPHONE HYDROCHLORIDE 1 MG/ML
1 INJECTION, SOLUTION INTRAMUSCULAR; INTRAVENOUS; SUBCUTANEOUS EVERY 4 HOURS
Status: DISCONTINUED | OUTPATIENT
Start: 2024-01-01 | End: 2024-01-01 | Stop reason: HOSPADM

## 2024-01-01 RX ORDER — SODIUM CHLORIDE 0.9 % (FLUSH) 0.9 %
5 SYRINGE (ML) INJECTION PRN
Status: DISCONTINUED | OUTPATIENT
Start: 2024-01-01 | End: 2024-01-01 | Stop reason: HOSPADM

## 2024-01-01 RX ORDER — SODIUM CHLORIDE 9 MG/ML
INJECTION, SOLUTION INTRAVENOUS PRN
Status: DISCONTINUED | OUTPATIENT
Start: 2024-01-01 | End: 2024-01-01

## 2024-01-01 RX ORDER — KETOROLAC TROMETHAMINE 30 MG/ML
15 INJECTION, SOLUTION INTRAMUSCULAR; INTRAVENOUS EVERY 6 HOURS PRN
Status: DISCONTINUED | OUTPATIENT
Start: 2024-01-01 | End: 2024-01-01 | Stop reason: HOSPADM

## 2024-01-01 RX ORDER — DIAZEPAM 5 MG/ML
5 INJECTION, SOLUTION INTRAMUSCULAR; INTRAVENOUS EVERY 4 HOURS
Status: DISCONTINUED | OUTPATIENT
Start: 2024-01-01 | End: 2024-01-01 | Stop reason: HOSPADM

## 2024-01-01 RX ORDER — ACETAMINOPHEN 650 MG/1
650 SUPPOSITORY RECTAL EVERY 4 HOURS PRN
Status: DISCONTINUED | OUTPATIENT
Start: 2024-01-01 | End: 2024-01-01 | Stop reason: HOSPADM

## 2024-01-01 RX ORDER — HYDROMORPHONE HYDROCHLORIDE 1 MG/ML
1 INJECTION, SOLUTION INTRAMUSCULAR; INTRAVENOUS; SUBCUTANEOUS
Status: DISCONTINUED | OUTPATIENT
Start: 2024-01-01 | End: 2024-01-01 | Stop reason: HOSPADM

## 2024-01-01 RX ORDER — GLYCOPYRROLATE 0.2 MG/ML
0.2 INJECTION INTRAMUSCULAR; INTRAVENOUS EVERY 4 HOURS PRN
Status: DISCONTINUED | OUTPATIENT
Start: 2024-01-01 | End: 2024-01-01 | Stop reason: HOSPADM

## 2024-01-01 RX ADMIN — HYDROMORPHONE HYDROCHLORIDE 1 MG: 1 INJECTION, SOLUTION INTRAMUSCULAR; INTRAVENOUS; SUBCUTANEOUS at 06:26

## 2024-01-01 RX ADMIN — DIAZEPAM 5 MG: 5 INJECTION, SOLUTION INTRAMUSCULAR; INTRAVENOUS at 10:12

## 2024-01-01 RX ADMIN — DIAZEPAM 5 MG: 5 INJECTION, SOLUTION INTRAMUSCULAR; INTRAVENOUS at 06:13

## 2024-01-01 RX ADMIN — DIAZEPAM 5 MG: 5 INJECTION, SOLUTION INTRAMUSCULAR; INTRAVENOUS at 17:52

## 2024-01-01 RX ADMIN — HYDROMORPHONE HYDROCHLORIDE 1 MG: 1 INJECTION, SOLUTION INTRAMUSCULAR; INTRAVENOUS; SUBCUTANEOUS at 16:11

## 2024-01-01 RX ADMIN — HYDROMORPHONE HYDROCHLORIDE 1 MG: 1 INJECTION, SOLUTION INTRAMUSCULAR; INTRAVENOUS; SUBCUTANEOUS at 02:32

## 2024-01-01 RX ADMIN — DIAZEPAM 5 MG: 5 INJECTION, SOLUTION INTRAMUSCULAR; INTRAVENOUS at 22:21

## 2024-01-01 RX ADMIN — HYDROMORPHONE HYDROCHLORIDE 1 MG: 1 INJECTION, SOLUTION INTRAMUSCULAR; INTRAVENOUS; SUBCUTANEOUS at 20:59

## 2024-01-01 RX ADMIN — HYDROMORPHONE HYDROCHLORIDE 1 MG: 1 INJECTION, SOLUTION INTRAMUSCULAR; INTRAVENOUS; SUBCUTANEOUS at 09:29

## 2024-01-01 RX ADMIN — DIAZEPAM 5 MG: 5 INJECTION, SOLUTION INTRAMUSCULAR; INTRAVENOUS at 09:30

## 2024-01-01 RX ADMIN — HYDROMORPHONE HYDROCHLORIDE 1 MG: 1 INJECTION, SOLUTION INTRAMUSCULAR; INTRAVENOUS; SUBCUTANEOUS at 02:26

## 2024-01-01 RX ADMIN — HYDROMORPHONE HYDROCHLORIDE 1 MG: 1 INJECTION, SOLUTION INTRAMUSCULAR; INTRAVENOUS; SUBCUTANEOUS at 10:11

## 2024-01-01 RX ADMIN — HYDROMORPHONE HYDROCHLORIDE 1 MG: 1 INJECTION, SOLUTION INTRAMUSCULAR; INTRAVENOUS; SUBCUTANEOUS at 14:37

## 2024-01-01 RX ADMIN — DIAZEPAM 5 MG: 5 INJECTION, SOLUTION INTRAMUSCULAR; INTRAVENOUS at 02:26

## 2024-01-01 RX ADMIN — DIAZEPAM 5 MG: 5 INJECTION, SOLUTION INTRAMUSCULAR; INTRAVENOUS at 16:11

## 2024-01-01 RX ADMIN — HYDROMORPHONE HYDROCHLORIDE 1 MG: 1 INJECTION, SOLUTION INTRAMUSCULAR; INTRAVENOUS; SUBCUTANEOUS at 06:13

## 2024-01-01 RX ADMIN — HYDROMORPHONE HYDROCHLORIDE 1 MG: 1 INJECTION, SOLUTION INTRAMUSCULAR; INTRAVENOUS; SUBCUTANEOUS at 22:20

## 2024-01-01 RX ADMIN — DIAZEPAM 5 MG: 5 INJECTION, SOLUTION INTRAMUSCULAR; INTRAVENOUS at 01:21

## 2024-01-01 RX ADMIN — DIAZEPAM 5 MG: 5 INJECTION, SOLUTION INTRAMUSCULAR; INTRAVENOUS at 14:38

## 2024-01-01 RX ADMIN — DIAZEPAM 5 MG: 5 INJECTION, SOLUTION INTRAMUSCULAR; INTRAVENOUS at 06:26

## 2024-01-01 RX ADMIN — DIAZEPAM 5 MG: 5 INJECTION, SOLUTION INTRAMUSCULAR; INTRAVENOUS at 21:00

## 2024-01-01 RX ADMIN — HYDROMORPHONE HYDROCHLORIDE 1 MG: 1 INJECTION, SOLUTION INTRAMUSCULAR; INTRAVENOUS; SUBCUTANEOUS at 17:51

## 2024-01-01 RX ADMIN — HYDROMORPHONE HYDROCHLORIDE 1 MG: 1 INJECTION, SOLUTION INTRAMUSCULAR; INTRAVENOUS; SUBCUTANEOUS at 01:23

## 2024-01-01 ASSESSMENT — PAIN SCALES - PAIN ASSESSMENT IN ADVANCED DEMENTIA (PAINAD)
CONSOLABILITY: NO NEED TO CONSOLE
BODYLANGUAGE: RELAXED
CONSOLABILITY: NO NEED TO CONSOLE
BREATHING: NORMAL
TOTALSCORE: 0
TOTALSCORE: 0
BREATHING: NORMAL
BREATHING: NORMAL
TOTALSCORE: 0
FACIALEXPRESSION: SMILING OR INEXPRESSIVE
BODYLANGUAGE: RELAXED
CONSOLABILITY: NO NEED TO CONSOLE
BODYLANGUAGE: RELAXED

## 2024-01-01 ASSESSMENT — PAIN SCALES - GENERAL
PAINLEVEL_OUTOF10: 7
PAINLEVEL_OUTOF10: 0

## 2024-01-01 ASSESSMENT — PAIN DESCRIPTION - DESCRIPTORS: DESCRIPTORS: PATIENT UNABLE TO DESCRIBE

## 2024-01-01 ASSESSMENT — PAIN DESCRIPTION - LOCATION: LOCATION: GENERALIZED

## 2024-01-01 ASSESSMENT — PAIN DESCRIPTION - ORIENTATION: ORIENTATION: POSTERIOR;ANTERIOR

## 2024-01-02 ENCOUNTER — TELEPHONE (OUTPATIENT)
Dept: PRIMARY CARE CLINIC | Facility: CLINIC | Age: 89
End: 2024-01-02

## 2024-01-02 DIAGNOSIS — E11.40 TYPE 2 DIABETES MELLITUS WITH DIABETIC NEUROPATHY, WITHOUT LONG-TERM CURRENT USE OF INSULIN (HCC): Primary | ICD-10-CM

## 2024-01-02 RX ORDER — GABAPENTIN 300 MG/1
600 CAPSULE ORAL NIGHTLY
Qty: 180 CAPSULE | Refills: 0 | Status: SHIPPED | OUTPATIENT
Start: 2024-01-02 | End: 2024-04-01

## 2024-01-02 NOTE — TELEPHONE ENCOUNTER
Ruperto Pantoja is requesting a refill on gabapentin.   Patient's last appointment was 8/28/2023  Next visit is scheduled for 4/8/2024   Please send medication to:   [facility] - [place], [place] - [address] [place] [address] - P [phone] - F [phone]  [address] [place] [address]  Phone: [phone] Fax: [phone]

## 2024-03-21 DIAGNOSIS — E11.40 TYPE 2 DIABETES MELLITUS WITH DIABETIC NEUROPATHY, WITHOUT LONG-TERM CURRENT USE OF INSULIN (HCC): ICD-10-CM

## 2024-03-22 RX ORDER — GABAPENTIN 300 MG/1
CAPSULE ORAL
Qty: 180 CAPSULE | Refills: 0 | Status: SHIPPED | OUTPATIENT
Start: 2024-03-22 | End: 2024-06-20

## 2024-04-08 ENCOUNTER — OFFICE VISIT (OUTPATIENT)
Dept: PRIMARY CARE CLINIC | Facility: CLINIC | Age: 89
End: 2024-04-08
Payer: MEDICARE

## 2024-04-08 VITALS
OXYGEN SATURATION: 99 % | SYSTOLIC BLOOD PRESSURE: 108 MMHG | TEMPERATURE: 98 F | BODY MASS INDEX: 27.78 KG/M2 | DIASTOLIC BLOOD PRESSURE: 82 MMHG | RESPIRATION RATE: 16 BRPM | HEART RATE: 65 BPM | WEIGHT: 177 LBS | HEIGHT: 67 IN

## 2024-04-08 DIAGNOSIS — M79.672 FOOT PAIN, LEFT: Primary | ICD-10-CM

## 2024-04-08 DIAGNOSIS — I10 ESSENTIAL (PRIMARY) HYPERTENSION: ICD-10-CM

## 2024-04-08 DIAGNOSIS — N18.31 CHRONIC KIDNEY DISEASE, STAGE 3A (HCC): ICD-10-CM

## 2024-04-08 DIAGNOSIS — E11.40 TYPE 2 DIABETES MELLITUS WITH DIABETIC NEUROPATHY, WITHOUT LONG-TERM CURRENT USE OF INSULIN (HCC): ICD-10-CM

## 2024-04-08 DIAGNOSIS — Z91.81 AT HIGH RISK FOR FALLS: ICD-10-CM

## 2024-04-08 DIAGNOSIS — I48.0 PAROXYSMAL ATRIAL FIBRILLATION (HCC): ICD-10-CM

## 2024-04-08 PROCEDURE — 1123F ACP DISCUSS/DSCN MKR DOCD: CPT | Performed by: NURSE PRACTITIONER

## 2024-04-08 PROCEDURE — 99214 OFFICE O/P EST MOD 30 MIN: CPT | Performed by: NURSE PRACTITIONER

## 2024-04-08 PROCEDURE — 1036F TOBACCO NON-USER: CPT | Performed by: NURSE PRACTITIONER

## 2024-04-08 PROCEDURE — G8427 DOCREV CUR MEDS BY ELIG CLIN: HCPCS | Performed by: NURSE PRACTITIONER

## 2024-04-08 PROCEDURE — G8419 CALC BMI OUT NRM PARAM NOF/U: HCPCS | Performed by: NURSE PRACTITIONER

## 2024-04-08 RX ORDER — GABAPENTIN 300 MG/1
CAPSULE ORAL
Qty: 180 CAPSULE | Refills: 1 | Status: SHIPPED | OUTPATIENT
Start: 2024-04-08 | End: 2024-10-08

## 2024-04-08 RX ORDER — INDAPAMIDE 1.25 MG/1
1.25 TABLET ORAL EVERY MORNING
Qty: 90 TABLET | Refills: 1 | Status: SHIPPED | OUTPATIENT
Start: 2024-04-08

## 2024-04-08 RX ORDER — GABAPENTIN 100 MG/1
CAPSULE ORAL
Qty: 270 CAPSULE | Refills: 1 | Status: SHIPPED | OUTPATIENT
Start: 2024-04-08 | End: 2024-10-08

## 2024-04-08 RX ORDER — INSULIN GLARGINE 100 [IU]/ML
INJECTION, SOLUTION SUBCUTANEOUS
Qty: 45 ML | Refills: 2 | Status: SHIPPED | OUTPATIENT
Start: 2024-04-08

## 2024-04-08 RX ORDER — SIMVASTATIN 20 MG
TABLET ORAL
Qty: 90 TABLET | Refills: 3 | Status: SHIPPED | OUTPATIENT
Start: 2024-04-08

## 2024-04-08 SDOH — ECONOMIC STABILITY: FOOD INSECURITY: WITHIN THE PAST 12 MONTHS, THE FOOD YOU BOUGHT JUST DIDN'T LAST AND YOU DIDN'T HAVE MONEY TO GET MORE.: NEVER TRUE

## 2024-04-08 SDOH — ECONOMIC STABILITY: FOOD INSECURITY: WITHIN THE PAST 12 MONTHS, YOU WORRIED THAT YOUR FOOD WOULD RUN OUT BEFORE YOU GOT MONEY TO BUY MORE.: NEVER TRUE

## 2024-04-08 SDOH — ECONOMIC STABILITY: INCOME INSECURITY: HOW HARD IS IT FOR YOU TO PAY FOR THE VERY BASICS LIKE FOOD, HOUSING, MEDICAL CARE, AND HEATING?: NOT HARD AT ALL

## 2024-04-08 ASSESSMENT — ENCOUNTER SYMPTOMS: SHORTNESS OF BREATH: 0

## 2024-04-08 ASSESSMENT — PATIENT HEALTH QUESTIONNAIRE - PHQ9
SUM OF ALL RESPONSES TO PHQ QUESTIONS 1-9: 0
SUM OF ALL RESPONSES TO PHQ QUESTIONS 1-9: 0
SUM OF ALL RESPONSES TO PHQ9 QUESTIONS 1 & 2: 0
SUM OF ALL RESPONSES TO PHQ QUESTIONS 1-9: 0
1. LITTLE INTEREST OR PLEASURE IN DOING THINGS: NOT AT ALL
SUM OF ALL RESPONSES TO PHQ QUESTIONS 1-9: 0
2. FEELING DOWN, DEPRESSED OR HOPELESS: NOT AT ALL

## 2024-04-09 LAB
ALBUMIN SERPL-MCNC: 3.8 G/DL (ref 3.6–4.6)
ALBUMIN/GLOB SERPL: 1.5 {RATIO} (ref 1.2–2.2)
ALP SERPL-CCNC: 83 IU/L (ref 44–121)
ALT SERPL-CCNC: 11 IU/L (ref 0–44)
AST SERPL-CCNC: 15 IU/L (ref 0–40)
BILIRUB SERPL-MCNC: 0.4 MG/DL (ref 0–1.2)
BUN SERPL-MCNC: 20 MG/DL (ref 10–36)
BUN/CREAT SERPL: 14 (ref 10–24)
CALCIUM SERPL-MCNC: 9.2 MG/DL (ref 8.6–10.2)
CHLORIDE SERPL-SCNC: 97 MMOL/L (ref 96–106)
CHOLEST SERPL-MCNC: 131 MG/DL (ref 100–199)
CO2 SERPL-SCNC: 26 MMOL/L (ref 20–29)
CREAT SERPL-MCNC: 1.43 MG/DL (ref 0.76–1.27)
EGFRCR SERPLBLD CKD-EPI 2021: 44 ML/MIN/1.73
ERYTHROCYTE [DISTWIDTH] IN BLOOD BY AUTOMATED COUNT: 12.9 % (ref 11.6–15.4)
GLOBULIN SER CALC-MCNC: 2.5 G/DL (ref 1.5–4.5)
GLUCOSE SERPL-MCNC: 174 MG/DL (ref 70–99)
HBA1C MFR BLD: 7.3 % (ref 4.8–5.6)
HCT VFR BLD AUTO: 41 % (ref 37.5–51)
HDLC SERPL-MCNC: 51 MG/DL
HGB BLD-MCNC: 13.5 G/DL (ref 13–17.7)
LDLC SERPL CALC-MCNC: 52 MG/DL (ref 0–99)
MCH RBC QN AUTO: 29.4 PG (ref 26.6–33)
MCHC RBC AUTO-ENTMCNC: 32.9 G/DL (ref 31.5–35.7)
MCV RBC AUTO: 89 FL (ref 79–97)
PLATELET # BLD AUTO: 161 X10E3/UL (ref 150–450)
POTASSIUM SERPL-SCNC: 4.5 MMOL/L (ref 3.5–5.2)
PROT SERPL-MCNC: 6.3 G/DL (ref 6–8.5)
RBC # BLD AUTO: 4.59 X10E6/UL (ref 4.14–5.8)
SODIUM SERPL-SCNC: 141 MMOL/L (ref 134–144)
TRIGL SERPL-MCNC: 168 MG/DL (ref 0–149)
VLDLC SERPL CALC-MCNC: 28 MG/DL (ref 5–40)
WBC # BLD AUTO: 7.2 X10E3/UL (ref 3.4–10.8)

## 2024-05-08 RX ORDER — METOPROLOL SUCCINATE 50 MG/1
50 TABLET, EXTENDED RELEASE ORAL DAILY
Qty: 90 TABLET | Refills: 3 | Status: SHIPPED | OUTPATIENT
Start: 2024-05-08

## 2024-06-22 ENCOUNTER — HOSPITAL ENCOUNTER (INPATIENT)
Facility: HOSPITAL | Age: 89
LOS: 5 days | Discharge: NURSING, REHAB OR LONG-TERM CARE | End: 2024-06-27
Attending: EMERGENCY MEDICINE | Admitting: STUDENT IN AN ORGANIZED HEALTH CARE EDUCATION/TRAINING PROGRAM
Payer: MEDICARE

## 2024-06-22 ENCOUNTER — APPOINTMENT (OUTPATIENT)
Facility: HOSPITAL | Age: 89
End: 2024-06-22
Payer: MEDICARE

## 2024-06-22 DIAGNOSIS — E11.40 TYPE 2 DIABETES MELLITUS WITH DIABETIC NEUROPATHY, WITHOUT LONG-TERM CURRENT USE OF INSULIN (HCC): ICD-10-CM

## 2024-06-22 DIAGNOSIS — T67.5XXA HEAT EXHAUSTION, INITIAL ENCOUNTER: ICD-10-CM

## 2024-06-22 DIAGNOSIS — R55 SYNCOPE AND COLLAPSE: Primary | ICD-10-CM

## 2024-06-22 DIAGNOSIS — A41.9 SEPTICEMIA (HCC): ICD-10-CM

## 2024-06-22 LAB
ALBUMIN SERPL-MCNC: 3.5 G/DL (ref 3.5–5.2)
ALBUMIN/GLOB SERPL: 1.1 (ref 1.1–2.2)
ALP SERPL-CCNC: 79 U/L (ref 40–129)
ALT SERPL-CCNC: <5 U/L (ref 10–50)
AMORPH CRY URNS QL MICRO: ABNORMAL
ANION GAP SERPL CALC-SCNC: 16 MMOL/L (ref 5–15)
APPEARANCE UR: CLEAR
AST SERPL-CCNC: 19 U/L (ref 10–50)
BACTERIA URNS QL MICRO: NEGATIVE /HPF
BASOPHILS # BLD: 0 K/UL (ref 0–1)
BASOPHILS NFR BLD: 0 % (ref 0–1)
BILIRUB SERPL-MCNC: 0.5 MG/DL (ref 0.2–1)
BILIRUB UR QL: NEGATIVE
BUN SERPL-MCNC: 23 MG/DL (ref 8–23)
BUN/CREAT SERPL: 15 (ref 12–20)
CALCIUM SERPL-MCNC: 9.1 MG/DL (ref 8.2–9.6)
CHLORIDE SERPL-SCNC: 101 MMOL/L (ref 98–107)
CK SERPL-CCNC: 53 U/L (ref 20–200)
CO2 SERPL-SCNC: 23 MMOL/L (ref 22–29)
COLOR UR: ABNORMAL
COMMENT:: NORMAL
CREAT SERPL-MCNC: 1.53 MG/DL (ref 0.7–1.2)
DIFFERENTIAL METHOD BLD: ABNORMAL
EOSINOPHIL # BLD: 0.1 K/UL (ref 0–0.4)
EOSINOPHIL NFR BLD: 1 % (ref 0–7)
EPITH CASTS URNS QL MICRO: ABNORMAL /LPF
ERYTHROCYTE [DISTWIDTH] IN BLOOD BY AUTOMATED COUNT: 13.2 % (ref 11.5–14.5)
FLUAV RNA SPEC QL NAA+PROBE: NOT DETECTED
FLUBV RNA SPEC QL NAA+PROBE: NOT DETECTED
GLOBULIN SER CALC-MCNC: 3.1 G/DL (ref 2–4)
GLUCOSE BLD STRIP.AUTO-MCNC: 181 MG/DL (ref 65–117)
GLUCOSE SERPL-MCNC: 178 MG/DL (ref 65–100)
GLUCOSE UR STRIP.AUTO-MCNC: NEGATIVE MG/DL
HCT VFR BLD AUTO: 41.3 % (ref 36.6–50.3)
HGB BLD-MCNC: 13.5 G/DL (ref 12.1–17)
HGB UR QL STRIP: ABNORMAL
HYALINE CASTS URNS QL MICRO: ABNORMAL /LPF
IMM GRANULOCYTES # BLD AUTO: 0.1 K/UL (ref 0–0.04)
IMM GRANULOCYTES NFR BLD AUTO: 1 % (ref 0–0.5)
KETONES UR QL STRIP.AUTO: NEGATIVE MG/DL
LACTATE SERPL-SCNC: 3.2 MMOL/L (ref 0.4–2)
LACTATE SERPL-SCNC: 4.8 MMOL/L (ref 0.4–2)
LEUKOCYTE ESTERASE UR QL STRIP.AUTO: NEGATIVE
LYMPHOCYTES # BLD: 1.2 K/UL (ref 0.8–3.5)
LYMPHOCYTES NFR BLD: 11 % (ref 12–49)
MCH RBC QN AUTO: 29 PG (ref 26–34)
MCHC RBC AUTO-ENTMCNC: 32.7 G/DL (ref 30–36.5)
MCV RBC AUTO: 88.6 FL (ref 80–99)
MONOCYTES # BLD: 0.5 K/UL (ref 0–1)
MONOCYTES NFR BLD: 5 % (ref 5–13)
MUCOUS THREADS URNS QL MICRO: ABNORMAL /LPF
NEUTS SEG # BLD: 9 K/UL (ref 1.8–8)
NEUTS SEG NFR BLD: 82 % (ref 32–75)
NITRITE UR QL STRIP.AUTO: NEGATIVE
NRBC # BLD: 0 K/UL (ref 0–0.01)
NRBC BLD-RTO: 0 PER 100 WBC
PH UR STRIP: 6.5 (ref 5–8)
PLATELET # BLD AUTO: 193 K/UL (ref 150–400)
PMV BLD AUTO: 11.7 FL (ref 8.9–12.9)
POTASSIUM SERPL-SCNC: 4.2 MMOL/L (ref 3.5–5.1)
PROT SERPL-MCNC: 6.6 G/DL (ref 6.4–8.3)
PROT UR STRIP-MCNC: NEGATIVE MG/DL
RBC # BLD AUTO: 4.66 M/UL (ref 4.1–5.7)
RBC #/AREA URNS HPF: ABNORMAL /HPF (ref 0–5)
SARS-COV-2 RNA RESP QL NAA+PROBE: NOT DETECTED
SERVICE CMNT-IMP: ABNORMAL
SODIUM SERPL-SCNC: 140 MMOL/L (ref 136–145)
SP GR UR REFRACTOMETRY: 1.02 (ref 1–1.03)
SPECIMEN HOLD: NORMAL
TROPONIN T SERPL HS-MCNC: 29.6 NG/L (ref 0–22)
TROPONIN T SERPL HS-MCNC: 31.6 NG/L (ref 0–22)
URINE CULTURE IF INDICATED: ABNORMAL
UROBILINOGEN UR QL STRIP.AUTO: 0.2 EU/DL (ref 0.2–1)
WBC # BLD AUTO: 10.9 K/UL (ref 4.1–11.1)
WBC URNS QL MICRO: ABNORMAL /HPF (ref 0–4)

## 2024-06-22 PROCEDURE — 87636 SARSCOV2 & INF A&B AMP PRB: CPT

## 2024-06-22 PROCEDURE — 93005 ELECTROCARDIOGRAM TRACING: CPT | Performed by: EMERGENCY MEDICINE

## 2024-06-22 PROCEDURE — 6360000002 HC RX W HCPCS: Performed by: EMERGENCY MEDICINE

## 2024-06-22 PROCEDURE — 6360000004 HC RX CONTRAST MEDICATION: Performed by: EMERGENCY MEDICINE

## 2024-06-22 PROCEDURE — 94761 N-INVAS EAR/PLS OXIMETRY MLT: CPT

## 2024-06-22 PROCEDURE — 99285 EMERGENCY DEPT VISIT HI MDM: CPT

## 2024-06-22 PROCEDURE — 85025 COMPLETE CBC W/AUTO DIFF WBC: CPT

## 2024-06-22 PROCEDURE — 2580000003 HC RX 258: Performed by: EMERGENCY MEDICINE

## 2024-06-22 PROCEDURE — 70450 CT HEAD/BRAIN W/O DYE: CPT

## 2024-06-22 PROCEDURE — 96374 THER/PROPH/DIAG INJ IV PUSH: CPT

## 2024-06-22 PROCEDURE — 81001 URINALYSIS AUTO W/SCOPE: CPT

## 2024-06-22 PROCEDURE — 2580000003 HC RX 258: Performed by: STUDENT IN AN ORGANIZED HEALTH CARE EDUCATION/TRAINING PROGRAM

## 2024-06-22 PROCEDURE — 72125 CT NECK SPINE W/O DYE: CPT

## 2024-06-22 PROCEDURE — 96360 HYDRATION IV INFUSION INIT: CPT

## 2024-06-22 PROCEDURE — 80053 COMPREHEN METABOLIC PANEL: CPT

## 2024-06-22 PROCEDURE — 87040 BLOOD CULTURE FOR BACTERIA: CPT

## 2024-06-22 PROCEDURE — 6370000000 HC RX 637 (ALT 250 FOR IP): Performed by: STUDENT IN AN ORGANIZED HEALTH CARE EDUCATION/TRAINING PROGRAM

## 2024-06-22 PROCEDURE — 2060000000 HC ICU INTERMEDIATE R&B

## 2024-06-22 PROCEDURE — 84484 ASSAY OF TROPONIN QUANT: CPT

## 2024-06-22 PROCEDURE — 6370000000 HC RX 637 (ALT 250 FOR IP): Performed by: EMERGENCY MEDICINE

## 2024-06-22 PROCEDURE — 83605 ASSAY OF LACTIC ACID: CPT

## 2024-06-22 PROCEDURE — 71260 CT THORAX DX C+: CPT

## 2024-06-22 PROCEDURE — 82962 GLUCOSE BLOOD TEST: CPT

## 2024-06-22 PROCEDURE — 6360000002 HC RX W HCPCS: Performed by: STUDENT IN AN ORGANIZED HEALTH CARE EDUCATION/TRAINING PROGRAM

## 2024-06-22 PROCEDURE — 82550 ASSAY OF CK (CPK): CPT

## 2024-06-22 PROCEDURE — 36415 COLL VENOUS BLD VENIPUNCTURE: CPT

## 2024-06-22 PROCEDURE — 73030 X-RAY EXAM OF SHOULDER: CPT

## 2024-06-22 RX ORDER — SODIUM CHLORIDE 9 MG/ML
INJECTION, SOLUTION INTRAVENOUS PRN
Status: DISCONTINUED | OUTPATIENT
Start: 2024-06-22 | End: 2024-06-27 | Stop reason: HOSPADM

## 2024-06-22 RX ORDER — INSULIN LISPRO 100 [IU]/ML
0-4 INJECTION, SOLUTION INTRAVENOUS; SUBCUTANEOUS NIGHTLY
Status: DISCONTINUED | OUTPATIENT
Start: 2024-06-22 | End: 2024-06-27 | Stop reason: HOSPADM

## 2024-06-22 RX ORDER — ACETAMINOPHEN 650 MG/1
650 SUPPOSITORY RECTAL EVERY 6 HOURS PRN
Status: DISCONTINUED | OUTPATIENT
Start: 2024-06-22 | End: 2024-06-27 | Stop reason: HOSPADM

## 2024-06-22 RX ORDER — ATORVASTATIN CALCIUM 10 MG/1
10 TABLET, FILM COATED ORAL NIGHTLY
Status: DISCONTINUED | OUTPATIENT
Start: 2024-06-22 | End: 2024-06-23

## 2024-06-22 RX ORDER — METRONIDAZOLE 500 MG/100ML
500 INJECTION, SOLUTION INTRAVENOUS EVERY 8 HOURS
Status: DISCONTINUED | OUTPATIENT
Start: 2024-06-22 | End: 2024-06-27 | Stop reason: HOSPADM

## 2024-06-22 RX ORDER — ENOXAPARIN SODIUM 100 MG/ML
30 INJECTION SUBCUTANEOUS DAILY
Status: DISCONTINUED | OUTPATIENT
Start: 2024-06-22 | End: 2024-06-22

## 2024-06-22 RX ORDER — ONDANSETRON 4 MG/1
4 TABLET, ORALLY DISINTEGRATING ORAL EVERY 8 HOURS PRN
Status: DISCONTINUED | OUTPATIENT
Start: 2024-06-22 | End: 2024-06-27 | Stop reason: HOSPADM

## 2024-06-22 RX ORDER — SODIUM CHLORIDE 0.9 % (FLUSH) 0.9 %
5-40 SYRINGE (ML) INJECTION EVERY 12 HOURS SCHEDULED
Status: DISCONTINUED | OUTPATIENT
Start: 2024-06-22 | End: 2024-06-27 | Stop reason: HOSPADM

## 2024-06-22 RX ORDER — 0.9 % SODIUM CHLORIDE 0.9 %
500 INTRAVENOUS SOLUTION INTRAVENOUS ONCE
Status: DISCONTINUED | OUTPATIENT
Start: 2024-06-22 | End: 2024-06-22

## 2024-06-22 RX ORDER — INSULIN LISPRO 100 [IU]/ML
0-8 INJECTION, SOLUTION INTRAVENOUS; SUBCUTANEOUS
Status: DISCONTINUED | OUTPATIENT
Start: 2024-06-22 | End: 2024-06-27 | Stop reason: HOSPADM

## 2024-06-22 RX ORDER — INSULIN GLARGINE 100 [IU]/ML
10 INJECTION, SOLUTION SUBCUTANEOUS
Status: DISCONTINUED | OUTPATIENT
Start: 2024-06-23 | End: 2024-06-23

## 2024-06-22 RX ORDER — 0.9 % SODIUM CHLORIDE 0.9 %
1000 INTRAVENOUS SOLUTION INTRAVENOUS ONCE
Status: COMPLETED | OUTPATIENT
Start: 2024-06-22 | End: 2024-06-22

## 2024-06-22 RX ORDER — SODIUM CHLORIDE 0.9 % (FLUSH) 0.9 %
5-40 SYRINGE (ML) INJECTION PRN
Status: DISCONTINUED | OUTPATIENT
Start: 2024-06-22 | End: 2024-06-27 | Stop reason: HOSPADM

## 2024-06-22 RX ORDER — METOPROLOL SUCCINATE 50 MG/1
50 TABLET, EXTENDED RELEASE ORAL DAILY
Status: DISCONTINUED | OUTPATIENT
Start: 2024-06-23 | End: 2024-06-23

## 2024-06-22 RX ORDER — ACETAMINOPHEN 500 MG
1000 TABLET ORAL
Status: COMPLETED | OUTPATIENT
Start: 2024-06-22 | End: 2024-06-22

## 2024-06-22 RX ORDER — POLYETHYLENE GLYCOL 3350 17 G/17G
17 POWDER, FOR SOLUTION ORAL DAILY PRN
Status: DISCONTINUED | OUTPATIENT
Start: 2024-06-22 | End: 2024-06-26

## 2024-06-22 RX ORDER — ACETAMINOPHEN 325 MG/1
650 TABLET ORAL EVERY 6 HOURS PRN
Status: DISCONTINUED | OUTPATIENT
Start: 2024-06-22 | End: 2024-06-27 | Stop reason: HOSPADM

## 2024-06-22 RX ORDER — SODIUM CHLORIDE, SODIUM LACTATE, POTASSIUM CHLORIDE, CALCIUM CHLORIDE 600; 310; 30; 20 MG/100ML; MG/100ML; MG/100ML; MG/100ML
INJECTION, SOLUTION INTRAVENOUS CONTINUOUS
Status: DISCONTINUED | OUTPATIENT
Start: 2024-06-22 | End: 2024-06-27 | Stop reason: HOSPADM

## 2024-06-22 RX ORDER — DEXTROSE MONOHYDRATE 100 MG/ML
INJECTION, SOLUTION INTRAVENOUS CONTINUOUS PRN
Status: DISCONTINUED | OUTPATIENT
Start: 2024-06-22 | End: 2024-06-27 | Stop reason: HOSPADM

## 2024-06-22 RX ORDER — ONDANSETRON 2 MG/ML
4 INJECTION INTRAMUSCULAR; INTRAVENOUS EVERY 6 HOURS PRN
Status: DISCONTINUED | OUTPATIENT
Start: 2024-06-22 | End: 2024-06-27 | Stop reason: HOSPADM

## 2024-06-22 RX ADMIN — SODIUM CHLORIDE 1000 ML: 9 INJECTION, SOLUTION INTRAVENOUS at 15:41

## 2024-06-22 RX ADMIN — CEFTRIAXONE 1000 MG: 1 INJECTION, POWDER, FOR SOLUTION INTRAMUSCULAR; INTRAVENOUS at 16:03

## 2024-06-22 RX ADMIN — SODIUM CHLORIDE 1000 ML: 9 INJECTION, SOLUTION INTRAVENOUS at 17:06

## 2024-06-22 RX ADMIN — SODIUM CHLORIDE, POTASSIUM CHLORIDE, SODIUM LACTATE AND CALCIUM CHLORIDE: 600; 310; 30; 20 INJECTION, SOLUTION INTRAVENOUS at 19:22

## 2024-06-22 RX ADMIN — CEFTRIAXONE 1000 MG: 1 INJECTION, POWDER, FOR SOLUTION INTRAMUSCULAR; INTRAVENOUS at 18:55

## 2024-06-22 RX ADMIN — SODIUM CHLORIDE, POTASSIUM CHLORIDE, SODIUM LACTATE AND CALCIUM CHLORIDE: 600; 310; 30; 20 INJECTION, SOLUTION INTRAVENOUS at 21:52

## 2024-06-22 RX ADMIN — ATORVASTATIN CALCIUM 10 MG: 10 TABLET, FILM COATED ORAL at 21:51

## 2024-06-22 RX ADMIN — ACETAMINOPHEN 1000 MG: 500 TABLET ORAL at 16:03

## 2024-06-22 RX ADMIN — SODIUM CHLORIDE, PRESERVATIVE FREE 10 ML: 5 INJECTION INTRAVENOUS at 21:51

## 2024-06-22 RX ADMIN — APIXABAN 2.5 MG: 2.5 TABLET, FILM COATED ORAL at 21:51

## 2024-06-22 RX ADMIN — IOPAMIDOL 100 ML: 755 INJECTION, SOLUTION INTRAVENOUS at 16:28

## 2024-06-22 RX ADMIN — METRONIDAZOLE 500 MG: 500 INJECTION, SOLUTION INTRAVENOUS at 18:59

## 2024-06-22 ASSESSMENT — PAIN SCALES - GENERAL: PAINLEVEL_OUTOF10: 0

## 2024-06-22 NOTE — PROGRESS NOTES
Is this patient to be included in the SEP-1 core measure? Yes SEP-1 CORE MEASURE DATA      Sepsis Criteria   Severe Sepsis Criteria   Septic Shock Criteria       Must meet 2:    [x]Temp >100.9 F (38.3 C) or < 96.8 F (36 C)  []HR > 90  []RR > 20  [x]WBC > 12 or < 4 or 10% bands    AND:    [] Infection Confirmed or Suspected.     Must meet 1:    [x]Lactate > 2       or   []Signs of Organ Dysfunction:    - SBP < 90 or MAP < 65  -Creatinine > 2 or increased from baseline  -Urine Output < 0.5 ml/kg/hr  -Bilirubin > 2  -INR > 1.5 (not anticoagulated)  -Platelets < 100,000  -Acute Respiratory Failure as evidenced by new need for NIPPV or mechanical ventilation   Must meet 1:    [x]Lactate > 4        or   []SBP < 90 or MAP < 65 for at least two readings in the first hour after fluid bolus administration    []Vasopressors initiated (if hypotension persists after fluid resuscitation)   Patient Vitals for the past 6 hrs:   BP Temp Pulse Resp SpO2 Height Weight Percent Weight Change   06/22/24 1459 130/61 (!) 103.1 °F (39.5 °C) 86 18 92 % 1.702 m (5' 7\") 77.1 kg (170 lb) 0   06/22/24 1545 -- 98.2 °F (36.8 °C) -- -- -- -- -- --      Recent Labs     06/22/24  1510   WBC 10.9   CREATININE 1.53*   BILITOT 0.5           Severe sepsis identified date: 6/22/24 time: 5pm    Fluid Resuscitation Rationale: at least 30mL/kg based on entered actual weight at time of triage    Repeat lactate level: ordered and pending at this time    Reassessment Exam: I have reassessed tissue perfusion and hemodynamic status after fluid bolus at this date/time: ER doc has already reassessed. I will reassess once more when patient arrives to ED

## 2024-06-22 NOTE — ED PROVIDER NOTES
[facility] INTERMEDIATE CARE UNIT  EMERGENCY DEPARTMENT ENCOUNTER      Pt Name: Ruperto Pantoja  MRN: [id number]  Birthdate 9/22/1924  Date of evaluation: 6/22/2024  Provider: Carmine Rivers MD      HISTORY OF PRESENT ILLNESS      89-year-old male with a history of hypertension, diabetes, hyperlipidemia, paroxysmal A-fib on Eliquis presenting to the ER for altered mental status.  Patient was evidently found down in his yard today.  He has multiple abrasions on his extremities.  EMS reports he has been a little bit lethargic.  He is febrile in the ER on arrival at 103 °F.  He is a slow historian, oriented to person only.  Is unclear whether he had a syncopal event or had a trip and fall.              Nursing Notes were reviewed.    REVIEW OF SYSTEMS         Review of Systems   Unable to perform ROS: Mental status change           PAST MEDICAL HISTORY     Past Medical History:   Diagnosis Date    Diabetes (HCC)     Hypercholesterolemia     Hypertension          SURGICAL HISTORY     No past surgical history on file.      CURRENT MEDICATIONS       Current Discharge Medication List        CONTINUE these medications which have NOT CHANGED    Details   metoprolol succinate (TOPROL XL) 50 MG extended release tablet TAKE 1 TABLET DAILY  Qty: 90 tablet, Refills: 3      apixaban (ELIQUIS) 5 MG TABS tablet Take 1 tablet by mouth 2 times daily ceived the following from [facility]: Outside name: Eliquis 5 mg tablet  Qty: 180 tablet, Refills: 1    Associated Diagnoses: Paroxysmal atrial fibrillation (HCC)      !! gabapentin (NEURONTIN) 100 MG capsule TAKE 1 CAPSULE THREE TIMES A DAY  Qty: 270 capsule, Refills: 1    Associated Diagnoses: Type 2 diabetes mellitus with diabetic neuropathy, without long-term current use of insulin (HCC)      !! gabapentin (NEURONTIN) 300 MG capsule TAKE 2 CAPSULES AT BEDTIME  Qty: 180 capsule, Refills: 1    Associated Diagnoses: Type 2 diabetes mellitus with diabetic neuropathy,  without long-term current use of insulin ([facility])      indapamide (LOZOL) 1.25 MG tablet Take 1 tablet by mouth every morning  Qty: 90 tablet, Refills: 1    Associated Diagnoses: Essential (primary) hypertension      insulin glargine (LANTUS SOLOSTAR) 100 UNIT/ML injection pen INJECT 10 UNITS EVERY MORNING  Qty: 45 mL, Refills: 2    Associated Diagnoses: Type 2 diabetes mellitus with diabetic neuropathy, without long-term current use of insulin ([facility])      simvastatin (ZOCOR) 20 MG tablet TAKE 1 TABLET DAILY IN THE EVENING  Qty: 90 tablet, Refills: 3    Associated Diagnoses: Type 2 diabetes mellitus with diabetic neuropathy, without long-term current use of insulin ([facility])      SITagliptin (JANUVIA) 50 MG tablet Take 1 tablet by mouth 2 times daily  Qty: 180 tablet, Refills: 1    Associated Diagnoses: Type 2 diabetes mellitus with diabetic neuropathy, without long-term current use of insulin ([facility])      SURE COMFORT PEN NEEDLES 32G X 4 MM MISC USE AS DIRECTED FOR TYPE 2 DIABETES MELLITUS WITH DIABETIC NEUROPATHY  Qty: 100 each, Refills: 11      FREESTYLE LITE strip USE AS DIRECTED TWICE A DAY  Qty: 200 strip, Refills: 3      mupirocin (BACTROBAN) 2 % ointment Apply topically daily       !! - Potential duplicate medications found. Please discuss with provider.          ALLERGIES     Penicillins    FAMILY HISTORY       Family History   Problem Relation Age of Onset    Hypertension Other     Diabetes Other           SOCIAL HISTORY       Social History     Socioeconomic History    Marital status: Single   Tobacco Use    Smoking status: Never    Smokeless tobacco: Never   Substance and Sexual Activity    Alcohol use: Not Currently    Drug use: Never     Social Determinants of Health     Financial Resource Strain: Low Risk  (4/8/2024)    Overall Financial Resource Strain (CARDIA)     Difficulty of Paying Living Expenses: Not hard at all   Food Insecurity: No Food Insecurity (4/8/2024)    Hunger Vital Sign     Worried About

## 2024-06-22 NOTE — H&P
Hospitalist Admission Note    NAME:  Ruperto Pantoja   :  1924   MRN:  [id number]     Date/Time:  2024 6:16 PM    Patient PCP: Brooks Goldman APRN - NP  ________________________________________________________________________    Given the patient's current clinical presentation, I have a high level of concern for decompensation if discharged from the emergency department.  Complex decision making was performed, which includes reviewing the patient's available past medical records, laboratory results, and x-ray films.       My assessment of this patient's clinical condition and my plan of care is as follows.    Assessment / Plan:    Syncope and collapse: POA. Acute. Likely dehydration and heat exhaustion based on temp POA, temp outside and GOGO POA. Ddx also includes cardiac causes such as arrythmia and MI in this 89 year old afib patient.Unclear how long he was down. Pan scan was w/oinfection, UA neg and COVID neg BUT he is febrile with LA>4 POA.  - admit to tele  - VS per unit  - tylenol PRN for fevers  - Doxy and flagyl for empiric broad-doni coverage (penicillin allergy and aortic aneurism)  - S/p 30cc/kg - give MIVF and watch for fluid overload  - fall precautions  - orthostatics  - neuro checks  - cards, tele, echo  - Address the below    Septic shock of unknown origin: POA. Acute. SIRS 2/4 with fever, bands. Could be explained by heat exhaustion - and most likely is. LA>4. No source = Pan scan was w/o infection, UA neg and COVID neg.  - CTX and flagyl for empiric broad-doni coverage  - Trend lactic to resolution  - Follow up on BCx  - Procal in the am  - MIVF    Elev trop: POA. Likely demand ischemia in s/o the above.  - cards  - trend trops  - tele  - echo    GOGO: POA. Acute. Very likely IVVD/prerenal from working outside in heat. S/p 30cc/kg in ED  - MIVF  - trend  - Obtain urine Na    Acute distal clavicle fracture: POA. Acute from fall most likely.  - Ortho consult  - Tylenol for pain

## 2024-06-23 LAB
ANION GAP SERPL CALC-SCNC: 12 MMOL/L (ref 5–15)
BASOPHILS # BLD: 0 K/UL (ref 0–0.1)
BASOPHILS NFR BLD: 0 % (ref 0–1)
BUN SERPL-MCNC: 22 MG/DL (ref 6–20)
BUN/CREAT SERPL: 19 (ref 12–20)
CALCIUM SERPL-MCNC: 7.6 MG/DL (ref 8.5–10.1)
CHLORIDE SERPL-SCNC: 107 MMOL/L (ref 97–108)
CO2 SERPL-SCNC: 19 MMOL/L (ref 21–32)
CREAT SERPL-MCNC: 1.13 MG/DL (ref 0.7–1.3)
DIFFERENTIAL METHOD BLD: ABNORMAL
EOSINOPHIL # BLD: 0 K/UL (ref 0–0.4)
EOSINOPHIL NFR BLD: 0 % (ref 0–7)
ERYTHROCYTE [DISTWIDTH] IN BLOOD BY AUTOMATED COUNT: 13.2 % (ref 11.5–14.5)
EST. AVERAGE GLUCOSE BLD GHB EST-MCNC: 140 MG/DL
GLUCOSE BLD STRIP.AUTO-MCNC: 150 MG/DL (ref 65–117)
GLUCOSE BLD STRIP.AUTO-MCNC: 176 MG/DL (ref 65–117)
GLUCOSE BLD STRIP.AUTO-MCNC: 198 MG/DL (ref 65–117)
GLUCOSE BLD STRIP.AUTO-MCNC: 209 MG/DL (ref 65–117)
GLUCOSE SERPL-MCNC: 174 MG/DL (ref 65–100)
HBA1C MFR BLD: 6.5 % (ref 4–5.6)
HCT VFR BLD AUTO: 32.5 % (ref 36.6–50.3)
HGB BLD-MCNC: 10.4 G/DL (ref 12.1–17)
IMM GRANULOCYTES # BLD AUTO: 0.1 K/UL (ref 0–0.04)
IMM GRANULOCYTES NFR BLD AUTO: 1 % (ref 0–0.5)
LACTATE SERPL-SCNC: 1.8 MMOL/L (ref 0.4–2)
LYMPHOCYTES # BLD: 0.8 K/UL (ref 0.8–3.5)
LYMPHOCYTES NFR BLD: 7 % (ref 12–49)
MCH RBC QN AUTO: 29.1 PG (ref 26–34)
MCHC RBC AUTO-ENTMCNC: 32 G/DL (ref 30–36.5)
MCV RBC AUTO: 91 FL (ref 80–99)
MONOCYTES # BLD: 0.7 K/UL (ref 0–1)
MONOCYTES NFR BLD: 6 % (ref 5–13)
NEUTS SEG # BLD: 9.6 K/UL (ref 1.8–8)
NEUTS SEG NFR BLD: 86 % (ref 32–75)
NRBC # BLD: 0 K/UL (ref 0–0.01)
NRBC BLD-RTO: 0 PER 100 WBC
PLATELET # BLD AUTO: 114 K/UL (ref 150–400)
PMV BLD AUTO: 11.8 FL (ref 8.9–12.9)
POTASSIUM SERPL-SCNC: 3.8 MMOL/L (ref 3.5–5.1)
PROCALCITONIN SERPL-MCNC: 0.76 NG/ML
RBC # BLD AUTO: 3.57 M/UL (ref 4.1–5.7)
RBC MORPH BLD: ABNORMAL
SERVICE CMNT-IMP: ABNORMAL
SODIUM SERPL-SCNC: 138 MMOL/L (ref 136–145)
WBC # BLD AUTO: 11.2 K/UL (ref 4.1–11.1)

## 2024-06-23 PROCEDURE — 83605 ASSAY OF LACTIC ACID: CPT

## 2024-06-23 PROCEDURE — 6360000002 HC RX W HCPCS: Performed by: STUDENT IN AN ORGANIZED HEALTH CARE EDUCATION/TRAINING PROGRAM

## 2024-06-23 PROCEDURE — 97165 OT EVAL LOW COMPLEX 30 MIN: CPT | Performed by: OCCUPATIONAL THERAPIST

## 2024-06-23 PROCEDURE — 80048 BASIC METABOLIC PNL TOTAL CA: CPT

## 2024-06-23 PROCEDURE — 99223 1ST HOSP IP/OBS HIGH 75: CPT | Performed by: STUDENT IN AN ORGANIZED HEALTH CARE EDUCATION/TRAINING PROGRAM

## 2024-06-23 PROCEDURE — 82962 GLUCOSE BLOOD TEST: CPT

## 2024-06-23 PROCEDURE — 6370000000 HC RX 637 (ALT 250 FOR IP): Performed by: INTERNAL MEDICINE

## 2024-06-23 PROCEDURE — 83036 HEMOGLOBIN GLYCOSYLATED A1C: CPT

## 2024-06-23 PROCEDURE — 2060000000 HC ICU INTERMEDIATE R&B

## 2024-06-23 PROCEDURE — 84145 PROCALCITONIN (PCT): CPT

## 2024-06-23 PROCEDURE — 97530 THERAPEUTIC ACTIVITIES: CPT

## 2024-06-23 PROCEDURE — APPSS30 APP SPLIT SHARED TIME 16-30 MINUTES: Performed by: NURSE PRACTITIONER

## 2024-06-23 PROCEDURE — 97535 SELF CARE MNGMENT TRAINING: CPT | Performed by: OCCUPATIONAL THERAPIST

## 2024-06-23 PROCEDURE — 97161 PT EVAL LOW COMPLEX 20 MIN: CPT

## 2024-06-23 PROCEDURE — 2580000003 HC RX 258: Performed by: STUDENT IN AN ORGANIZED HEALTH CARE EDUCATION/TRAINING PROGRAM

## 2024-06-23 PROCEDURE — 6370000000 HC RX 637 (ALT 250 FOR IP): Performed by: STUDENT IN AN ORGANIZED HEALTH CARE EDUCATION/TRAINING PROGRAM

## 2024-06-23 PROCEDURE — 97116 GAIT TRAINING THERAPY: CPT

## 2024-06-23 PROCEDURE — 36415 COLL VENOUS BLD VENIPUNCTURE: CPT

## 2024-06-23 PROCEDURE — 85025 COMPLETE CBC W/AUTO DIFF WBC: CPT

## 2024-06-23 PROCEDURE — 94761 N-INVAS EAR/PLS OXIMETRY MLT: CPT

## 2024-06-23 RX ORDER — METOPROLOL SUCCINATE 25 MG/1
25 TABLET, EXTENDED RELEASE ORAL DAILY
Status: DISCONTINUED | OUTPATIENT
Start: 2024-06-23 | End: 2024-06-23

## 2024-06-23 RX ORDER — METOPROLOL SUCCINATE 25 MG/1
25 TABLET, EXTENDED RELEASE ORAL DAILY
Status: DISCONTINUED | OUTPATIENT
Start: 2024-06-23 | End: 2024-06-27 | Stop reason: HOSPADM

## 2024-06-23 RX ORDER — ALOGLIPTIN 6.25 MG/1
12.5 TABLET, FILM COATED ORAL DAILY
Status: DISCONTINUED | OUTPATIENT
Start: 2024-06-23 | End: 2024-06-27 | Stop reason: HOSPADM

## 2024-06-23 RX ORDER — INSULIN GLARGINE 100 [IU]/ML
10 INJECTION, SOLUTION SUBCUTANEOUS EVERY MORNING
Status: DISCONTINUED | OUTPATIENT
Start: 2024-06-23 | End: 2024-06-27 | Stop reason: HOSPADM

## 2024-06-23 RX ORDER — HYDROCHLOROTHIAZIDE 25 MG/1
25 TABLET ORAL DAILY
Status: DISCONTINUED | OUTPATIENT
Start: 2024-06-23 | End: 2024-06-27 | Stop reason: HOSPADM

## 2024-06-23 RX ORDER — GABAPENTIN 100 MG/1
100 CAPSULE ORAL 2 TIMES DAILY
Status: DISCONTINUED | OUTPATIENT
Start: 2024-06-23 | End: 2024-06-27 | Stop reason: HOSPADM

## 2024-06-23 RX ORDER — ATORVASTATIN CALCIUM 20 MG/1
20 TABLET, FILM COATED ORAL DAILY
Status: DISCONTINUED | OUTPATIENT
Start: 2024-06-23 | End: 2024-06-27 | Stop reason: HOSPADM

## 2024-06-23 RX ADMIN — WATER 2000 MG: 1 INJECTION INTRAMUSCULAR; INTRAVENOUS; SUBCUTANEOUS at 17:22

## 2024-06-23 RX ADMIN — METRONIDAZOLE 500 MG: 500 INJECTION, SOLUTION INTRAVENOUS at 09:34

## 2024-06-23 RX ADMIN — GABAPENTIN 100 MG: 100 CAPSULE ORAL at 09:28

## 2024-06-23 RX ADMIN — METRONIDAZOLE 500 MG: 500 INJECTION, SOLUTION INTRAVENOUS at 17:24

## 2024-06-23 RX ADMIN — ATORVASTATIN CALCIUM 20 MG: 20 TABLET, FILM COATED ORAL at 09:28

## 2024-06-23 RX ADMIN — METRONIDAZOLE 500 MG: 500 INJECTION, SOLUTION INTRAVENOUS at 01:16

## 2024-06-23 RX ADMIN — INSULIN LISPRO 2 UNITS: 100 INJECTION, SOLUTION INTRAVENOUS; SUBCUTANEOUS at 12:31

## 2024-06-23 RX ADMIN — GABAPENTIN 100 MG: 100 CAPSULE ORAL at 20:12

## 2024-06-23 RX ADMIN — INSULIN GLARGINE 10 UNITS: 100 INJECTION, SOLUTION SUBCUTANEOUS at 09:28

## 2024-06-23 RX ADMIN — APIXABAN 5 MG: 5 TABLET, FILM COATED ORAL at 20:12

## 2024-06-23 RX ADMIN — SODIUM CHLORIDE, PRESERVATIVE FREE 10 ML: 5 INJECTION INTRAVENOUS at 20:12

## 2024-06-23 RX ADMIN — APIXABAN 2.5 MG: 2.5 TABLET, FILM COATED ORAL at 09:40

## 2024-06-23 RX ADMIN — METOPROLOL SUCCINATE 25 MG: 25 TABLET, EXTENDED RELEASE ORAL at 09:28

## 2024-06-23 ASSESSMENT — PAIN SCALES - GENERAL: PAINLEVEL_OUTOF10: 0

## 2024-06-23 NOTE — CONSULTS
ORTHOPAEDIC CONSULT NOTE    Subjective:     Date of Consultation:  June 23, 2024      Ruperto Pantoja is a 89 y.o. male who is being seen for hypertension, diabetes, hyperlipidemia, paroxysmal A-fib on Eliquis that was brought to the ED after a fall yesterday, pt states he fell outside of his car. He has multiple abrasions on his extremities. Work up reveled a L distal clavicle fracture. Pt states he lives at home alone.    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   Prior to Admission medications    Medication Sig Start Date End Date Taking? Authorizing Provider   metoprolol succinate (TOPROL XL) 50 MG extended release tablet TAKE 1 TABLET DAILY 5/8/24   Kenyatta Duran, CED - CNP   apixaban (ELIQUIS) 5 MG TABS tablet Take 1 tablet by mouth 2 times daily ceived the following from [facility]: Outside name: Eliquis 5 mg tablet 4/8/24   Brooks Goldman APRN - NP   gabapentin (NEURONTIN) 100 MG capsule TAKE 1 CAPSULE THREE TIMES A DAY 4/8/24 10/8/24  Brooks Goldman APRN - NP   gabapentin (NEURONTIN) 300 MG capsule TAKE 2 CAPSULES AT BEDTIME 4/8/24 10/8/24  Brooks Goldman APRN - NP   indapamide (LOZOL) 1.25 MG tablet Take 1 tablet by mouth every morning 4/8/24   Brooks Goldman APRN - NP   insulin glargine (LANTUS SOLOSTAR) 100  Urine 0.2 0.2 - 1.0 EU/dL    Nitrite, Urine Negative NEG      Leukocyte Esterase, Urine Negative NEG      WBC, UA 0-4 0 - 4 /hpf    RBC, UA 0-5 0 - 5 /hpf    Epithelial Cells, UA FEW FEW /lpf    BACTERIA, URINE Negative NEG /hpf    Urine Culture if Indicated CULTURE NOT INDICATED BY UA RESULT      Mucus, UA TRACE (A) NEG /lpf    Amorphous Crystal FEW (A) NEG      Hyaline Casts, UA 0-2 (A) NEG /lpf   Troponin T - [facility]    Collection Time: 06/22/24  5:36 PM   Result Value Ref Range    Troponin T 29.6 (H) 0 - 22 ng/L   Lactic Acid    Collection Time: 06/22/24  5:36 PM   Result Value Ref Range    Lactic Acid, Plasma 3.2 (HH) 0.4 - 2.0 MMOL/L   POCT Glucose    Collection Time: 06/22/24  9:10 PM   Result Value Ref Range    POC Glucose 181 (H) 65 - 117 mg/dL    Performed by: [facility] Jen    Basic Metabolic Panel w/ Reflex to MG    Collection Time: 06/23/24  1:27 AM   Result Value Ref Range    Sodium 138 136 - 145 mmol/L    Potassium 3.8 3.5 - 5.1 mmol/L    Chloride 107 97 - 108 mmol/L    CO2 19 (L) 21 - 32 mmol/L    Anion Gap 12 5 - 15 mmol/L    Glucose 174 (H) 65 - 100 mg/dL    BUN 22 (H) 6 - 20 MG/DL    Creatinine 1.13 0.70 - 1.30 MG/DL    BUN/Creatinine Ratio 19 12 - 20      Est, Glom Filt Rate 58 (L) >60 ml/min/1.73m2    Calcium 7.6 (L) 8.5 - 10.1 MG/DL   CBC with Auto Differential    Collection Time: 06/23/24  1:27 AM   Result Value Ref Range    WBC 11.2 (H) 4.1 - 11.1 K/uL    RBC 3.57 (L) 4.10 - 5.70 M/uL    Hemoglobin 10.4 (L) 12.1 - 17.0 g/dL    Hematocrit 32.5 (L) 36.6 - 50.3 %    MCV 91.0 80.0 - 99.0 FL    MCH 29.1 26.0 - 34.0 PG    MCHC 32.0 30.0 - 36.5 g/dL    RDW 13.2 11.5 - 14.5 %    Platelets 114 (L) 150 - 400 K/uL    MPV 11.8 8.9 - 12.9 FL    Nucleated RBCs 0.0 0  WBC    nRBC 0.00 0.00 - 0.01 K/uL    Neutrophils % 86 (H) 32 - 75 %    Lymphocytes % 7 (L) 12 - 49 %    Monocytes % 6 5 - 13 %    Eosinophils % 0 0 - 7 %    Basophils % 0 0 - 1 %    Immature Granulocytes % 1 (H) 0.0 -

## 2024-06-23 NOTE — PLAN OF CARE
Problem: Physical Therapy - Adult  Goal: By Discharge: Performs mobility at highest level of function for planned discharge setting.  See evaluation for individualized goals.  Description: FUNCTIONAL STATUS PRIOR TO ADMISSION: Patient reports being Mod I with RW or walls inside the home and single (?) axillary crutch while outside in the yard. He reports only 1 fall about a year ago.    HOME SUPPORT PRIOR TO ADMISSION: Patient lives alone with his cat. He has a local daughter and a \"cleaning lady\" that provides assistance with grocery shopping and other needs if they arise.    Physical Therapy Goals  Initiated 6/23/2024  1.  Patient will move from supine to sit and sit to supine, scoot up and down, and roll side to side in bed with contact guard assist within 7 day(s).    2.  Patient will perform sit to stand with contact guard assist within 7 day(s).  3.  Patient will transfer from bed to chair and chair to bed with contact guard assist using the least restrictive device within 7 day(s).  4.  Patient will ambulate with contact guard assist for 50 feet with the least restrictive device within 7 day(s).   5.  Patient will ascend/descend 3 stairs with 0 handrail(s) with minimal assistance within 7 day(s).   Outcome: Progressing     PHYSICAL THERAPY EVALUATION    Patient: Ruperto Pantoja (89 y.o. male)  Date: 6/23/2024  Primary Diagnosis: Syncope and collapse [R55]       Precautions: Restrictions/Precautions: Weight Bearing, Skin, Fall Risk  Required Braces or Orthoses?: Yes Required Braces or Orthoses?: Yes     Left Upper Extremity Weight Bearing: Non Weight Bearing       Required Braces or Orthoses  Left Upper Extremity Brace/Splint: Sling      ASSESSMENT :   DEFICITS/IMPAIRMENTS:   The patient is limited by decreased functional mobility, independence in ADLs, ROM, strength, body mechanics, activity tolerance, safety awareness, balance, posture, increased pain levels s/p admission for syncopal event. Mechanism of  Mobility Short Forms. Physical Therapy Mar 2014, 94 ) [phone]; DOI: 10.2522/ptj.37023638  2. Luis Eduardo HERNANDEZ, Aashish J, Sandra J, Bailee BLAIR. Association of AM-PAC \"6-Clicks\" Basic Mobility and Daily Activity Scores With Discharge Destination. Phys Ther. 2021 Apr 4;101(4):rxyf680. doi: 10.1093/ptj/kaks496. PMID: 45619150.  3. Keaton BLAIR, Zaid MARISCAL,  S, Manfred K, Elizabeth ALMANZA. Activity Measure for Post-Acute Care \"6-Clicks\" Basic Mobility Scores Predict Discharge Destination After Acute Care Hospitalization in Select Patient Groups: A Retrospective, Observational Study. Arch Rehabil Res Clin Transl. 2022 Jul 16;4(3):585113. doi: 10.1016/j.arrct.2022.630048. PMID: 52480031; PMCID: [id number].  4. Lexie DOMIINQUE, Farheen S, Yaquelin W, Linda P. AM-PAC Short Forms Manual 4.0. Revised 2/2020.                                                                                                                                                                                                                               Pain Rating:  Endorses mild to moderate neck pain - neck in flexed position   Pain Intervention(s):   nursing notified    Activity Tolerance:   Fair  and requires rest breaks    After treatment:   Patient left in no apparent distress sitting up in chair, Call bell within reach, and Bed/ chair alarm activated  Towel supporting neck      COMMUNICATION/EDUCATION:   The patient's plan of care was discussed with: occupational therapist and registered nurse    Patient Education  Education Given To: Patient  Education Provided: Role of Therapy;Plan of Care;Precautions;Equipment  Education Method: Verbal  Barriers to Learning: Hearing  Education Outcome: Verbalized understanding;Continued education needed    Thank you for this referral.  Hiram Boudreaux, PT  Minutes: 41      Physical Therapy Evaluation Charge Determination   History Examination Presentation Decision-Making   LOW Complexity : Zero comorbidities / personal factors that will

## 2024-06-23 NOTE — CONSULTS
[facility] CARDIOLOGY                    Cardiology Care Note     [x]Initial Encounter     []Follow-up    Patient Name: Ruperto Pantoja - :1924 - MRN:[id number]  Primary Cardiologist: no longer follows w/ cards, prev VCS   Consulting Cardiologist: Roman Berger DO     Reason for encounter: Syncope    HPI:       Ruperto Pantoja is a 89 y.o. male with PMH significant for PAF on Eliquis, s/p Medtronic pacer, HTN, HLD, and DM.     Pt presented to the ED  after he was found down in the yard, unclear how long he was down. Pt had altered mental status on arrival with temp of 103.1, which has since resolved. He reports he went outside to find his cat, was wearing black, and think he became overheated and passed out. Has multiple bruises and abrasions. Prior to this episode he was in his usual state of health. Symptoms have since resolved. No CP, SOB, palpitations or edema. He hasn't seen a cardiologist in last couple of years.     Subjective:      Ruperto Pantoja reports his legs itch.      Assessment and Plan     Syncope  - in setting of overheating, dehydration   - BP still soft at times, cont IVF  - decrease metoprolol dose   - Trop T mildly elevated, unremarkable  - less likely this is cardiac in nature    2. Hx of PAF on Eliquis, s/p Medtronic pacer  - decreased metoprolol dose as above  - last EF in  50%, do not think repeat echo is needed     3. HTN  - BP labile, monitor and avoid aggressive management of hypertension     Would cont hydration, given his advanced age would not pursue any invasive testing. May d/c home whenever otherwise medically stable        ____________________________________________________________    Cardiac testing  TTE :   EF of 50%, borderline enlarged aortic root. No significant valvular heart disease    Most recent HS troponins:  No results for input(s): \"TROPHS\", \"CKMB\" in the last 72 hours.    ECG:   Encounter Date: 24   EKG 12 Lead   Result Value     No further evaluation is  indicated  STOMACH: Unremarkable.  SMALL BOWEL: No dilatation or wall thickening.  COLON: No dilatation or wall thickening.  APPENDIX: Unremarkable.  PERITONEUM: No ascites or pneumoperitoneum.  RETROPERITONEUM: Small saccular aneurysm off the posterior distal aorta just  above the bifurcation measuring 3.2 cm. No adenopathy extensive vascular  calcifications  REPRODUCTIVE ORGANS: Brachytherapy seeds within the prostate  URINARY BLADDER: No mass or calculus.  BONES: No destructive bone lesion. Chronic right-sided rib fracture  ADDITIONAL COMMENTS: Large bilateral inguinal hernias containing fat    Impression  1. Small left pleural effusion otherwise no acute abnormality in the chest    2. 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 otherwise no acute  abnormality of the abdomen or pelvis    Electronically signed by Araceli Horvath      Lab Results   Component Value Date/Time     06/23/2024 01:27 AM    K 3.8 06/23/2024 01:27 AM     06/23/2024 01:27 AM    CO2 19 06/23/2024 01:27 AM    BUN 22 06/23/2024 01:27 AM    CREATININE 1.13 06/23/2024 01:27 AM    GLUCOSE 174 06/23/2024 01:27 AM    GLUCOSE 174 04/08/2024 11:41 AM    CALCIUM 7.6 06/23/2024 01:27 AM    LABGLOM 58 06/23/2024 01:27 AM    LABGLOM 44 04/08/2024 11:41 AM      No results found for: \"BNP\"  Lab Results   Component Value Date    WBC 11.2 (H) 06/23/2024    HGB 10.4 (L) 06/23/2024    HCT 32.5 (L) 06/23/2024    MCV 91.0 06/23/2024     (L) 06/23/2024     No results for input(s): \"CHOL\", \"HDLC\", \"HBA1C\" in the last 72 hours.    Invalid input(s): \"TGL\", \"LDLC\"    Current meds:    Current Facility-Administered Medications:     metoprolol succinate (TOPROL XL) extended release tablet 25 mg, 25 mg, Oral, Daily, Meera Mckeon APRN - NP    sodium chloride flush 0.9 % injection 5-40 mL, 5-40 mL, IntraVENous, 2 times per day, Jyoti Roberson MD, 10 mL at 06/22/24 8321    sodium chloride flush 0.9 % injection 5-40

## 2024-06-23 NOTE — PROGRESS NOTES
2134:Pt's BP cuff appropriate size for Pt and BP checked 3x. Highest reading on both arms was 74/33. MD notified of all BP readings. BP checked on leg with same size cuff and /65. MD aware.     2154: Dressings changed and applied to Pt's wounds. Pt has extensive blistering on lower abd and half of body. Pictures in this comment. Wound care orders placed.

## 2024-06-23 NOTE — ED NOTES
TRANSFER - OUT REPORT:    Verbal report given by day shift RNSusan to JOSE MIGUEL Li  on Ruperto Pantoja  being transferred to [facility] for routine progression of patient care

## 2024-06-23 NOTE — PLAN OF CARE
Problem: Occupational Therapy - Adult  Goal: By Discharge: Performs self-care activities at highest level of function for planned discharge setting.  See evaluation for individualized goals.  Description: FUNCTIONAL STATUS PRIOR TO ADMISSION:  Patient was ambulatory using a rolling walker in the house and crutch on right outside of house  Receives Help From: Family, Friend(s) (daughter and cleaning lady help him), ADL Assistance: Independent,  ,  ,  ,  ,  , Homemaking Assistance: Needs assistance,  , Transfer Assistance: Independent (uses walker in the house and crutch outside of the house), Active : Yes     HOME SUPPORT: Patient lived alone with daughter and cleaning lady to provide assistance.    Occupational Therapy Goals:  Initiated 6/23/2024  1.  Patient will perform static standing without UE support > or = 3 minutes with Contact Guard Assist within 7 day(s).  2.  Patient will perform upper body dressing with Minimal Assist including sling management within 7 day(s).  3.  Patient will perform lower body dressing with Minimal Assist and best technique/adaptive equipment within 7 day(s).  4.  Patient will perform toilet transfers with Minimal Assist into and out of bathroom with recommended adaptive equipment within 7 day(s).  5.  Patient will perform all aspects of toileting with Moderate Assist within 7 day(s).  6.  Patient will adhere to NWB left UE during functional transfers without cues within 7 day(s).   Outcome: Progressing    OCCUPATIONAL THERAPY EVALUATION    Patient: Ruperto Pantoja (89 y.o. male)  Date: 6/23/2024  Primary Diagnosis: Syncope and collapse [R55]         Precautions: Weight Bearing, Skin, Fall Risk       Left Upper Extremity Weight Bearing: Non Weight Bearing          ASSESSMENT :  The patient is limited by decreased functional mobility, independence in ADLs, high-level IADLs, ROM, strength, activity tolerance, safety awareness, attention/concentration, balance, posture,  increased pain levels.    Based on the impairments listed above presents without complaint of left UE pain and max cues to prevent weight bearing through left UE for transfers, however seated edge of bed and in standing noted to have increased cervical flexion and complaint of pain. Noted cervical CT completed and negative for acute changes. Patient has his way of doing things and safety awareness impaired, reported when using walker at home he would carry his coffee in his left hand and push the walker with his right so he feels he  could continue to do that at home. Patient with significant skin tears bilateral thighs and nursing dressing them. Per his report due to crawling in his driveway. Hopeful he could make progress in this setting and go home with increased support.        PLAN :  Recommendations and Planned Interventions:   self care training, therapeutic activities, functional mobility training, balance training, therapeutic exercise, endurance activities, patient education, home safety training, and family training/education    Frequency/Duration:      Recommendation for discharge: (in order for the patient to meet his/her long term goals): Therapy 2x a week in the home, however, may require supervision, cognitive and/or physical assistance due to the following concerns listed below:    Other factors to consider for discharge: lives alone, high risk for falls, not safe to be alone, concern for safely navigating or managing the home environment, and new weight bearing restrictions limiting activity or patient is unable to maintain    IF patient discharges home will need the following DME: continuing to assess with progress       SUBJECTIVE:   Patient stated, “I need to get on the floor to get back to bed.”  OBJECTIVE DATA SUMMARY:     Past Medical History:   Diagnosis Date    Diabetes (HCC)     Hypercholesterolemia     Hypertension    No past surgical history on file.       Expanded or extensive

## 2024-06-23 NOTE — PROGRESS NOTES
Rapid Called at 2115    Responded to RRT at 2115 for Hypotension    Provider at bedside: YES  Interventions ordered: reassessment  Sepsis Suspected: No  Transfer to Higher Level of Care: na  Blood Glucose: na     Vitals:    06/22/24 2106   BP: (!) 74/33   Pulse: 58   Resp: 16   Temp: 97.3 °F (36.3 °C)   SpO2: 99%     After changing the blood pressure cuff, BP is much improved.     Rapid Ended at 2123  RRT RN assisted with transport to accepting unit [facility].    Clyde Foss RN

## 2024-06-23 NOTE — PROGRESS NOTES
mg  25 mg Oral Daily    insulin glargine (LANTUS;BASAGLAR) injection pen 10 Units  10 Units SubCUTAneous QAM    metoprolol succinate (TOPROL XL) extended release tablet 25 mg  25 mg Oral Daily    atorvastatin (LIPITOR) tablet 20 mg  20 mg Oral Daily    [Held by provider] alogliptin (NESINA) tablet 12.5 mg  12.5 mg Oral Daily    apixaban (ELIQUIS) tablet 2.5 mg  2.5 mg Oral BID    sodium chloride flush 0.9 % injection 5-40 mL  5-40 mL IntraVENous 2 times per day    sodium chloride flush 0.9 % injection 5-40 mL  5-40 mL IntraVENous PRN    0.9 % sodium chloride infusion   IntraVENous PRN    ondansetron (ZOFRAN-ODT) disintegrating tablet 4 mg  4 mg Oral Q8H PRN    Or    ondansetron (ZOFRAN) injection 4 mg  4 mg IntraVENous Q6H PRN    polyethylene glycol (GLYCOLAX) packet 17 g  17 g Oral Daily PRN    acetaminophen (TYLENOL) tablet 650 mg  650 mg Oral Q6H PRN    Or    acetaminophen (TYLENOL) suppository 650 mg  650 mg Rectal Q6H PRN    lactated ringers IV soln infusion   IntraVENous Continuous    metroNIDAZOLE (FLAGYL) 500 mg in 0.9% NaCl 100 mL IVPB premix  500 mg IntraVENous Q8H    cefTRIAXone (ROCEPHIN) 2,000 mg in sterile water 20 mL IV syringe  2,000 mg IntraVENous Q24H    glucose chewable tablet 16 g  4 tablet Oral PRN    dextrose bolus 10% 125 mL  125 mL IntraVENous PRN    Or    dextrose bolus 10% 250 mL  250 mL IntraVENous PRN    glucagon injection 1 mg  1 mg SubCUTAneous PRN    dextrose 10 % infusion   IntraVENous Continuous PRN    insulin lispro (HUMALOG,ADMELOG) injection vial 0-8 Units  0-8 Units SubCUTAneous TID WC    insulin lispro (HUMALOG,ADMELOG) injection vial 0-4 Units  0-4 Units SubCUTAneous Nightly          Relevant other informations:     Other medical conditions listed in [facility] problem list section; all of these and other pertinent data were taken into consideration when treatment plan is developed and customized to this patient's unique overall circumstances and needs. We have reviewed  available old medical records within the constraints of this admission process.        Data Review:   Recent Days:    Results       Procedure Component Value Units Date/Time    Blood Culture 2 [[id number]] Collected: 06/22/24 1539    Order Status: Completed Specimen: Blood Updated: 06/22/24 2149     Special Requests --        NO SPECIAL REQUESTS  LEFT  Antecubital       Culture NO GROWTH <24 HRS       COVID-19 & Influenza Combo [[id number]] Collected: 06/22/24 1539    Order Status: Completed Specimen: Nasopharyngeal Updated: 06/22/24 1603     SARS-CoV-2, PCR Not detected        Comment: Not Detected results do not preclude SARS-CoV-2 infection and should not be used as the sole basis for patient management decisions.Results must be combined with clinical observations, patient history, and epidemiological information.        Rapid Influenza A By PCR Not detected        Rapid Influenza B By PCR Not detected        Comment: Testing was performed using ny Meghan SARS-CoV-2 and Influenza A/B nucleic acid assay.  This test is a multiplex Real-Time Reverse Transcriptase Polymerase Chain Reaction (RT-PCR) based in vitro diagnostic test intended for the qualitative detection of nucleic acids from SARS-CoV-2, Influenza A, and Influenza B in nasopharyngeal and nasal swab specimens for use under the FDA's Emergency Use Authorization (EUA) only.     Fact sheet for Patients: https://www.fda.gov/media/583171/download  Fact sheet for Healthcare Providers: https://www.fda.fov/media/865604/download         Blood Culture 1 [[id number]] Collected: 06/22/24 1525    Order Status: Completed Specimen: Blood Updated: 06/22/24 2150     Special Requests --        NO SPECIAL REQUESTS  RIGHT  Antecubital       Culture NO GROWTH <24 HRS                Recent Labs     06/22/24  1510 06/23/24  0127   WBC 10.9 11.2*   HGB 13.5 10.4*   HCT 41.3 32.5*    114*     Recent Labs     06/22/24  1510 06/23/24  0127    138   K 4.2 3.8

## 2024-06-23 NOTE — ED NOTES
TRANSFER - OUT REPORT:    Verbal report given to [facility] on Ruperto Pantoja  being transferred to [facility] for routine progression of patient care       Report consisted of patient's Situation, Background, Assessment and   Recommendations(SBAR).     Information from the following report(s) Nurse Handoff Report, Index, ED Encounter Summary, ED SBAR, Adult Overview, MAR, Recent Results, Cardiac Rhythm NSR, Quality Measures, Neuro Assessment, and Event Log was reviewed with the receiving nurse.    [place] Fall Assessment:    Presents to emergency department  because of falls (Syncope, seizure, or loss of consciousness): No  Age > 70: No  Altered Mental Status, Intoxication with alcohol or substance confusion (Disorientation, impaired judgment, poor safety awaremess, or inability to follow instructions): No  Impaired Mobility: Ambulates or transfers with assistive devices or assistance; Unable to ambulate or transer.: No             Lines:   Peripheral IV 06/22/24 Right Antecubital (Active)       Peripheral IV 06/22/24 Left;Proximal Forearm (Active)        Opportunity for questions and clarification was provided.      Patient transported with:  Monitor

## 2024-06-23 NOTE — PROGRESS NOTES
Pharmacy Dosing Services: 06/23/24  Eliquis dose change per renal protocol  Physician Dr Latif  Indication: Afib    Serum Creatinine Lab Results   Component Value Date/Time    CREATININE 1.13 06/23/2024 01:27 AM      Creatinine Clearance Estimated Creatinine Clearance: 33 mL/min (based on SCr of 1.13 mg/dL).   BUN Lab Results   Component Value Date/Time    BUN 22 06/23/2024 01:27 AM         Current regimen: Eliquis 2.5mg BID  New regimen: Eliquis 5mg BID    Pharmacist  Jenny Barton, WinstonD   [phone]

## 2024-06-24 ENCOUNTER — APPOINTMENT (OUTPATIENT)
Facility: HOSPITAL | Age: 89
End: 2024-06-24
Attending: STUDENT IN AN ORGANIZED HEALTH CARE EDUCATION/TRAINING PROGRAM
Payer: MEDICARE

## 2024-06-24 LAB
ANION GAP SERPL CALC-SCNC: 9 MMOL/L (ref 5–15)
B PERT DNA SPEC QL NAA+PROBE: NOT DETECTED
BASOPHILS # BLD: 0.1 K/UL (ref 0–0.1)
BASOPHILS NFR BLD: 0 % (ref 0–1)
BORDETELLA PARAPERTUSSIS BY PCR: NOT DETECTED
BUN SERPL-MCNC: 33 MG/DL (ref 6–20)
BUN/CREAT SERPL: 20 (ref 12–20)
C PNEUM DNA SPEC QL NAA+PROBE: NOT DETECTED
CALCIUM SERPL-MCNC: 8.2 MG/DL (ref 8.5–10.1)
CHLORIDE SERPL-SCNC: 106 MMOL/L (ref 97–108)
CO2 SERPL-SCNC: 22 MMOL/L (ref 21–32)
CREAT SERPL-MCNC: 1.65 MG/DL (ref 0.7–1.3)
DIFFERENTIAL METHOD BLD: ABNORMAL
ECHO AO ROOT DIAM: 2.7 CM
ECHO AO ROOT INDEX: 1.43 CM/M2
ECHO AV AREA PEAK VELOCITY: 2.1 CM2
ECHO AV AREA VTI: 2 CM2
ECHO AV AREA/BSA PEAK VELOCITY: 1.1 CM2/M2
ECHO AV AREA/BSA VTI: 1.1 CM2/M2
ECHO AV MEAN GRADIENT: 5 MMHG
ECHO AV MEAN VELOCITY: 1.1 M/S
ECHO AV PEAK GRADIENT: 10 MMHG
ECHO AV PEAK VELOCITY: 1.6 M/S
ECHO AV VELOCITY RATIO: 0.63
ECHO AV VTI: 30.7 CM
ECHO BSA: 1.91 M2
ECHO LA DIAMETER INDEX: 1.38 CM/M2
ECHO LA DIAMETER: 2.6 CM
ECHO LA TO AORTIC ROOT RATIO: 0.96
ECHO LV E' LATERAL VELOCITY: 4 CM/S
ECHO LV E' SEPTAL VELOCITY: 8 CM/S
ECHO LV EDV A2C: 81 ML
ECHO LV EDV A4C: 61 ML
ECHO LV EDV BP: 73 ML (ref 67–155)
ECHO LV EDV INDEX A4C: 32 ML/M2
ECHO LV EDV INDEX BP: 39 ML/M2
ECHO LV EDV NDEX A2C: 43 ML/M2
ECHO LV EJECTION FRACTION A2C: 47 %
ECHO LV EJECTION FRACTION A4C: 56 %
ECHO LV EJECTION FRACTION BIPLANE: 52 % (ref 55–100)
ECHO LV ESV A2C: 43 ML
ECHO LV ESV A4C: 27 ML
ECHO LV ESV BP: 35 ML (ref 22–58)
ECHO LV ESV INDEX A2C: 23 ML/M2
ECHO LV ESV INDEX A4C: 14 ML/M2
ECHO LV ESV INDEX BP: 19 ML/M2
ECHO LV FRACTIONAL SHORTENING: 11 % (ref 28–44)
ECHO LV INTERNAL DIMENSION DIASTOLE INDEX: 2.01 CM/M2
ECHO LV INTERNAL DIMENSION DIASTOLIC: 3.8 CM (ref 4.2–5.9)
ECHO LV INTERNAL DIMENSION SYSTOLIC INDEX: 1.8 CM/M2
ECHO LV INTERNAL DIMENSION SYSTOLIC: 3.4 CM
ECHO LV IVSD: 0.9 CM (ref 0.6–1)
ECHO LV MASS 2D: 143.8 G (ref 88–224)
ECHO LV MASS INDEX 2D: 76.1 G/M2 (ref 49–115)
ECHO LV POSTERIOR WALL DIASTOLIC: 1.4 CM (ref 0.6–1)
ECHO LV RELATIVE WALL THICKNESS RATIO: 0.74
ECHO LVOT AREA: 3.5 CM2
ECHO LVOT AV VTI INDEX: 0.6
ECHO LVOT DIAM: 2.1 CM
ECHO LVOT MEAN GRADIENT: 2 MMHG
ECHO LVOT PEAK GRADIENT: 4 MMHG
ECHO LVOT PEAK VELOCITY: 1 M/S
ECHO LVOT STROKE VOLUME INDEX: 33.5 ML/M2
ECHO LVOT SV: 63.4 ML
ECHO LVOT VTI: 18.3 CM
ECHO MV A VELOCITY: 0.75 M/S
ECHO MV AREA VTI: 2.4 CM2
ECHO MV E DECELERATION TIME (DT): 300.3 MS
ECHO MV E VELOCITY: 0.66 M/S
ECHO MV E/A RATIO: 0.88
ECHO MV E/E' LATERAL: 16.5
ECHO MV E/E' RATIO (AVERAGED): 12.38
ECHO MV E/E' SEPTAL: 8.25
ECHO MV LVOT VTI INDEX: 1.43
ECHO MV MAX VELOCITY: 0.9 M/S
ECHO MV MEAN GRADIENT: 1 MMHG
ECHO MV MEAN VELOCITY: 0.6 M/S
ECHO MV PEAK GRADIENT: 3 MMHG
ECHO MV VTI: 26.2 CM
EKG ATRIAL RATE: 79 BPM
EKG DIAGNOSIS: NORMAL
EKG P AXIS: 74 DEGREES
EKG P-R INTERVAL: 264 MS
EKG Q-T INTERVAL: 458 MS
EKG QRS DURATION: 182 MS
EKG QTC CALCULATION (BAZETT): 525 MS
EKG R AXIS: -74 DEGREES
EKG T AXIS: 84 DEGREES
EKG VENTRICULAR RATE: 79 BPM
EOSINOPHIL # BLD: 0.1 K/UL (ref 0–0.4)
EOSINOPHIL NFR BLD: 1 % (ref 0–7)
ERYTHROCYTE [DISTWIDTH] IN BLOOD BY AUTOMATED COUNT: 13.8 % (ref 11.5–14.5)
FLUAV SUBTYP SPEC NAA+PROBE: NOT DETECTED
FLUBV RNA SPEC QL NAA+PROBE: NOT DETECTED
GLUCOSE BLD STRIP.AUTO-MCNC: 135 MG/DL (ref 65–117)
GLUCOSE BLD STRIP.AUTO-MCNC: 150 MG/DL (ref 65–117)
GLUCOSE BLD STRIP.AUTO-MCNC: 154 MG/DL (ref 65–117)
GLUCOSE BLD STRIP.AUTO-MCNC: 169 MG/DL (ref 65–117)
GLUCOSE SERPL-MCNC: 155 MG/DL (ref 65–100)
HADV DNA SPEC QL NAA+PROBE: NOT DETECTED
HCOV 229E RNA SPEC QL NAA+PROBE: NOT DETECTED
HCOV HKU1 RNA SPEC QL NAA+PROBE: NOT DETECTED
HCOV NL63 RNA SPEC QL NAA+PROBE: NOT DETECTED
HCOV OC43 RNA SPEC QL NAA+PROBE: NOT DETECTED
HCT VFR BLD AUTO: 40.4 % (ref 36.6–50.3)
HGB BLD-MCNC: 13.4 G/DL (ref 12.1–17)
HMPV RNA SPEC QL NAA+PROBE: NOT DETECTED
HPIV1 RNA SPEC QL NAA+PROBE: NOT DETECTED
HPIV2 RNA SPEC QL NAA+PROBE: NOT DETECTED
HPIV3 RNA SPEC QL NAA+PROBE: NOT DETECTED
HPIV4 RNA SPEC QL NAA+PROBE: NOT DETECTED
IMM GRANULOCYTES # BLD AUTO: 0.2 K/UL (ref 0–0.04)
IMM GRANULOCYTES NFR BLD AUTO: 1 % (ref 0–0.5)
LYMPHOCYTES # BLD: 1.6 K/UL (ref 0.8–3.5)
LYMPHOCYTES NFR BLD: 11 % (ref 12–49)
M PNEUMO DNA SPEC QL NAA+PROBE: NOT DETECTED
MCH RBC QN AUTO: 29 PG (ref 26–34)
MCHC RBC AUTO-ENTMCNC: 33.2 G/DL (ref 30–36.5)
MCV RBC AUTO: 87.4 FL (ref 80–99)
MONOCYTES # BLD: 1.4 K/UL (ref 0–1)
MONOCYTES NFR BLD: 9 % (ref 5–13)
NEUTS SEG # BLD: 11.5 K/UL (ref 1.8–8)
NEUTS SEG NFR BLD: 78 % (ref 32–75)
NRBC # BLD: 0 K/UL (ref 0–0.01)
NRBC BLD-RTO: 0 PER 100 WBC
PLATELET # BLD AUTO: 131 K/UL (ref 150–400)
PMV BLD AUTO: 11.6 FL (ref 8.9–12.9)
POTASSIUM SERPL-SCNC: 3.8 MMOL/L (ref 3.5–5.1)
RBC # BLD AUTO: 4.62 M/UL (ref 4.1–5.7)
RSV RNA SPEC QL NAA+PROBE: NOT DETECTED
RV+EV RNA SPEC QL NAA+PROBE: NOT DETECTED
SARS-COV-2 RNA RESP QL NAA+PROBE: NOT DETECTED
SERVICE CMNT-IMP: ABNORMAL
SODIUM SERPL-SCNC: 137 MMOL/L (ref 136–145)
WBC # BLD AUTO: 14.9 K/UL (ref 4.1–11.1)

## 2024-06-24 PROCEDURE — 97530 THERAPEUTIC ACTIVITIES: CPT

## 2024-06-24 PROCEDURE — 6360000004 HC RX CONTRAST MEDICATION: Performed by: INTERNAL MEDICINE

## 2024-06-24 PROCEDURE — 93306 TTE W/DOPPLER COMPLETE: CPT | Performed by: SPECIALIST

## 2024-06-24 PROCEDURE — 82962 GLUCOSE BLOOD TEST: CPT

## 2024-06-24 PROCEDURE — 94761 N-INVAS EAR/PLS OXIMETRY MLT: CPT

## 2024-06-24 PROCEDURE — C8929 TTE W OR WO FOL WCON,DOPPLER: HCPCS

## 2024-06-24 PROCEDURE — 6370000000 HC RX 637 (ALT 250 FOR IP): Performed by: INTERNAL MEDICINE

## 2024-06-24 PROCEDURE — 6360000002 HC RX W HCPCS: Performed by: STUDENT IN AN ORGANIZED HEALTH CARE EDUCATION/TRAINING PROGRAM

## 2024-06-24 PROCEDURE — 0202U NFCT DS 22 TRGT SARS-COV-2: CPT

## 2024-06-24 PROCEDURE — 97535 SELF CARE MNGMENT TRAINING: CPT

## 2024-06-24 PROCEDURE — 6370000000 HC RX 637 (ALT 250 FOR IP): Performed by: STUDENT IN AN ORGANIZED HEALTH CARE EDUCATION/TRAINING PROGRAM

## 2024-06-24 PROCEDURE — 2580000003 HC RX 258: Performed by: STUDENT IN AN ORGANIZED HEALTH CARE EDUCATION/TRAINING PROGRAM

## 2024-06-24 PROCEDURE — 2060000000 HC ICU INTERMEDIATE R&B

## 2024-06-24 PROCEDURE — 80048 BASIC METABOLIC PNL TOTAL CA: CPT

## 2024-06-24 PROCEDURE — 93010 ELECTROCARDIOGRAM REPORT: CPT | Performed by: SPECIALIST

## 2024-06-24 PROCEDURE — 85025 COMPLETE CBC W/AUTO DIFF WBC: CPT

## 2024-06-24 PROCEDURE — 36415 COLL VENOUS BLD VENIPUNCTURE: CPT

## 2024-06-24 PROCEDURE — 51798 US URINE CAPACITY MEASURE: CPT

## 2024-06-24 RX ADMIN — SODIUM CHLORIDE, PRESERVATIVE FREE 10 ML: 5 INJECTION INTRAVENOUS at 20:53

## 2024-06-24 RX ADMIN — METRONIDAZOLE 500 MG: 500 INJECTION, SOLUTION INTRAVENOUS at 08:36

## 2024-06-24 RX ADMIN — POLYETHYLENE GLYCOL 3350 17 G: 17 POWDER, FOR SOLUTION ORAL at 23:02

## 2024-06-24 RX ADMIN — APIXABAN 2.5 MG: 2.5 TABLET, FILM COATED ORAL at 20:53

## 2024-06-24 RX ADMIN — INSULIN GLARGINE 10 UNITS: 100 INJECTION, SOLUTION SUBCUTANEOUS at 08:33

## 2024-06-24 RX ADMIN — METRONIDAZOLE 500 MG: 500 INJECTION, SOLUTION INTRAVENOUS at 18:06

## 2024-06-24 RX ADMIN — APIXABAN 5 MG: 5 TABLET, FILM COATED ORAL at 08:34

## 2024-06-24 RX ADMIN — METRONIDAZOLE 500 MG: 500 INJECTION, SOLUTION INTRAVENOUS at 02:47

## 2024-06-24 RX ADMIN — PERFLUTREN 1.5 ML: 6.52 INJECTION, SUSPENSION INTRAVENOUS at 14:43

## 2024-06-24 RX ADMIN — GABAPENTIN 100 MG: 100 CAPSULE ORAL at 20:53

## 2024-06-24 RX ADMIN — GABAPENTIN 100 MG: 100 CAPSULE ORAL at 08:33

## 2024-06-24 RX ADMIN — SODIUM CHLORIDE, POTASSIUM CHLORIDE, SODIUM LACTATE AND CALCIUM CHLORIDE: 600; 310; 30; 20 INJECTION, SOLUTION INTRAVENOUS at 09:54

## 2024-06-24 RX ADMIN — METOPROLOL SUCCINATE 25 MG: 25 TABLET, EXTENDED RELEASE ORAL at 08:33

## 2024-06-24 RX ADMIN — ATORVASTATIN CALCIUM 20 MG: 20 TABLET, FILM COATED ORAL at 08:34

## 2024-06-24 RX ADMIN — SODIUM CHLORIDE, PRESERVATIVE FREE 10 ML: 5 INJECTION INTRAVENOUS at 08:34

## 2024-06-24 RX ADMIN — WATER 2000 MG: 1 INJECTION INTRAMUSCULAR; INTRAVENOUS; SUBCUTANEOUS at 16:01

## 2024-06-24 ASSESSMENT — PAIN SCALES - GENERAL: PAINLEVEL_OUTOF10: 0

## 2024-06-24 NOTE — PROGRESS NOTES
Pharmacy Dosing Services: 6/24/24    Pharmacist Renal Dosing  Note for apixaban   Physician Dr. Paula    Indication: A fib  Previous Regimen 5mg bid   Serum Creatinine Lab Results   Component Value Date/Time    CREATININE 1.65 06/24/2024 04:01 AM      Creatinine Clearance Estimated Creatinine Clearance: 23 mL/min (A) (based on SCr of 1.65 mg/dL (H)).   BUN Lab Results   Component Value Date/Time    BUN 33 06/24/2024 04:01 AM         Plan:  Age 89, Scr 1.65 = Eliquis 2.5mg BID (will change back if Scr improves)    Syl Hopkins, PharmD, BCPS

## 2024-06-24 NOTE — PROGRESS NOTES
Hospitalist Progress Note    NAME: Ruperto Pantoja   :  1924   MRN:  [id number]     Date/Time:  2024 10:26 AM    Patient PCP: Brooks Goldman APRN - NAVEED       Subjective:   CHIEF COMPLAINT: Sepsis       Patient with sepsis, likely secondary to leg infection.  Also has a pleural effusion on imaging.  Positive lower extremity edema.  Echo pending.  RVP ordered.        Objective:   VITALS:    Vitals:    24 0914   BP: 103/66   Pulse:    Resp:    Temp:    SpO2:        PHYSICAL EXAM:      General:   No acute distress, resting comfortably in bed.  Heart:  RRR, No MRG  Lungs:  CTAB.  Non-labored breathing.  Abdomen:  Soft .  Nondistended.  NTTP.  BS present.  Extremities: 1+ bilateral lower extremity edema, wounds present  Skin:  No rash  Neuro:  Alert and interactive.  No focal deficits.  Generalized weakness  Psych: Mood stable.        LAB DATA REVIEWED:    Recent Results (from the past 24 hour(s))   POCT Glucose    Collection Time: 24 11:52 AM   Result Value Ref Range    POC Glucose 209 (H) 65 - 117 mg/dL    Performed by: Finesse Longo    POCT Glucose    Collection Time: 24  3:46 PM   Result Value Ref Range    POC Glucose 198 (H) 65 - 117 mg/dL    Performed by: Finesse Longo    POCT Glucose    Collection Time: 24  9:56 PM   Result Value Ref Range    POC Glucose 150 (H) 65 - 117 mg/dL    Performed by: Jose Martin bowie    CBC with Auto Differential    Collection Time: 24  4:01 AM   Result Value Ref Range    WBC 14.9 (H) 4.1 - 11.1 K/uL    RBC 4.62 4.10 - 5.70 M/uL    Hemoglobin 13.4 12.1 - 17.0 g/dL    Hematocrit 40.4 36.6 - 50.3 %    MCV 87.4 80.0 - 99.0 FL    MCH 29.0 26.0 - 34.0 PG    MCHC 33.2 30.0 - 36.5 g/dL    RDW 13.8 11.5 - 14.5 %    Platelets 131 (L) 150 - 400 K/uL    MPV 11.6 8.9 - 12.9 FL    Nucleated RBCs 0.0 0  WBC    nRBC 0.00 0.00 - 0.01 K/uL    Neutrophils % 78 (H) 32 - 75 %    Lymphocytes % 11 (L) 12 - 49 %    Monocytes % 9 5 - 13 %     Eosinophils % 1 0 - 7 %    Basophils % 0 0 - 1 %    Immature Granulocytes % 1 (H) 0.0 - 0.5 %    Neutrophils Absolute 11.5 (H) 1.8 - 8.0 K/UL    Lymphocytes Absolute 1.6 0.8 - 3.5 K/UL    Monocytes Absolute 1.4 (H) 0.0 - 1.0 K/UL    Eosinophils Absolute 0.1 0.0 - 0.4 K/UL    Basophils Absolute 0.1 0.0 - 0.1 K/UL    Immature Granulocytes Absolute 0.2 (H) 0.00 - 0.04 K/UL    Differential Type AUTOMATED     Basic Metabolic Panel    Collection Time: 06/24/24  4:01 AM   Result Value Ref Range    Sodium 137 136 - 145 mmol/L    Potassium 3.8 3.5 - 5.1 mmol/L    Chloride 106 97 - 108 mmol/L    CO2 22 21 - 32 mmol/L    Anion Gap 9 5 - 15 mmol/L    Glucose 155 (H) 65 - 100 mg/dL    BUN 33 (H) 6 - 20 MG/DL    Creatinine 1.65 (H) 0.70 - 1.30 MG/DL    BUN/Creatinine Ratio 20 12 - 20      Est, Glom Filt Rate 37 (L) >60 ml/min/1.73m2    Calcium 8.2 (L) 8.5 - 10.1 MG/DL   POCT Glucose    Collection Time: 06/24/24  8:13 AM   Result Value Ref Range    POC Glucose 135 (H) 65 - 117 mg/dL    Performed by: Finesse Longo          IMAGING DATA REVIEWED:    XR SHOULDER LEFT (MIN 2 VIEWS)   Final Result      Acute distal clavicle fracture.      Electronically signed by Faraday      CT CHEST ABDOMEN PELVIS W CONTRAST Additional Contrast? None   Final Result         1. Small left pleural effusion otherwise no acute abnormality in the chest      2. 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 No acute bony abnormality      2. Left pleural effusion         Electronically signed by Faraday      CT Head W/O Contrast   Final Result         No acute abnormality      Electronically signed by Faraday                CURRENT MEDICATION ORDERS:  Current Facility-Administered Medications: gabapentin (NEURONTIN) capsule 100 mg, 100 mg, Oral, BID  [Held by provider]

## 2024-06-24 NOTE — PROGRESS NOTES
Spiritual Care Partner Volunteer visited patient at [facility] in [facility] INTERMEDIATE CARE UNIT on 6/24/2024    Documented by:  Chaplain Keesha Ridley MS, MDiv, [facility]  Spiritual Health Services  Paging service: [phone] (KOBE)

## 2024-06-24 NOTE — PLAN OF CARE
Problem: Occupational Therapy - Adult  Goal: By Discharge: Performs self-care activities at highest level of function for planned discharge setting.  See evaluation for individualized goals.  Description: FUNCTIONAL STATUS PRIOR TO ADMISSION:  Patient was ambulatory using a rolling walker in the house and crutch on right outside of house  Receives Help From: Family, Friend(s) (daughter and cleaning lady help him), ADL Assistance: Independent,  ,  ,  ,  ,  , Homemaking Assistance: Needs assistance,  , Transfer Assistance: Independent (uses walker in the house and crutch outside of the house), Active : Yes     HOME SUPPORT: Patient lived alone with daughter and cleaning lady to provide assistance.    Occupational Therapy Goals:  Initiated 6/23/2024  1.  Patient will perform static standing without UE support > or = 3 minutes with Contact Guard Assist within 7 day(s).  2.  Patient will perform upper body dressing with Minimal Assist including sling management within 7 day(s).  3.  Patient will perform lower body dressing with Minimal Assist and best technique/adaptive equipment within 7 day(s).  4.  Patient will perform toilet transfers with Minimal Assist into and out of bathroom with recommended adaptive equipment within 7 day(s).  5.  Patient will perform all aspects of toileting with Moderate Assist within 7 day(s).  6.  Patient will adhere to NWB left UE during functional transfers without cues within 7 day(s).   Outcome: Progressing   OCCUPATIONAL THERAPY TREATMENT  Patient: Ruperto Pantoja (89 y.o. male)  Date: 6/24/2024  Primary Diagnosis: Syncope and collapse [R55]       Precautions: Weight Bearing, Skin, Fall Risk       Left Upper Extremity Weight Bearing: Non Weight Bearing        Chart, occupational therapy assessment, plan of care, and goals were reviewed.    ASSESSMENT  Patient continues to benefit from skilled OT services and is progressing towards goals. Patient received supine in bed  assistance;Moderate assistance;Assist X2     Transfers:   Transfer Training  Interventions: Demonstration;Weight shifting training/pressure relief;Safety awareness training;Verbal cues  Sit to Stand: Moderate assistance;Assist X2  Stand to Sit: Moderate assistance;Assist X2           Balance:     Balance  Sitting: Impaired  Sitting - Static: Good (unsupported)  Sitting - Dynamic: Fair (occasional)  Standing: Impaired  Standing - Static: Constant support;Poor  Standing - Dynamic: Constant support;Poor      ADL Intervention:                                            UE Dressing: Maximum assistance  UE Dressing Skilled Clinical Factors: sling    LE Dressing: Dependent/Total  LE Dressing Skilled Clinical Factors: socks                            Skin Care: Chlorhexidine wipes      Pain Rating:  Reports neck and L clavicle pain, does not rate/10   Pain Intervention(s):   nursing notified, rest, and repositioning      Activity Tolerance:   requires rest breaks and signs and symptoms of orthostatic hypotension  Please refer to the flowsheet for vital signs taken during this treatment.    After treatment:   Patient left in no apparent distress in bed, Call bell within reach, Bed/ chair alarm activated, and Side rails x3    COMMUNICATION/EDUCATION:   The patient's plan of care was discussed with: physical therapist and registered nurse         Thank you for this referral.  Domonique Ennis OTR/L  Minutes: 25

## 2024-06-24 NOTE — WOUND CARE
Wound Care Note:     New consult for \"extensive blistering on thighs,lower abd. Scraping on elbows and possible DTI on sacrum\".   Seen in B309/01    89 y.o. y/o male admitted on 6/22/2024   Admitted for Syncope and collapse [R55]   History of DM, HLD, HTN     WBC = 14.9  No indication for wound culture  Diet: ADULT DIET; Regular; 4 carb choices (60 gm/meal)           Assessment:   Patient [place], alert, communicative and reports no pain. Refusing to turn at this time for assessment of backside. Wearing briefs.    Surface: Sycamore bed with CHRISTINA mattress    Bilateral heels intact and without erythema.  Heels offloaded with pillows.    1. POA Bilateral thighs   Trauma: Scattered unroofed, decompressed and serous filled bullae, moist, red, serous drainage, painful, surrounding blanchable erythema.  Tx: Cleansed with Vashe, applied Mepitle One to viable skin flaps, applied Hydrogel over Mepitle One, applied Xeroform to open areas, and covered with Mepilex transfer.            2. POA Right lower abdomen  Trauma: Scattered unroofed bullae, moist, red, serous drainage, painful, surrounding blanchable redness.   Tx: Cleansed with Vashe, applied Xeroform to open areas and covered with bordered gauze.      3. POA left forearm  Skin tear: moist, red, serous drainage, nonviable flap, painful, surrounding ecchymosis.   Tx: Cleansed with Vashe, applied Xeroform and wrapped with rolled gauze.     4. POA right elbow  Skin tear: moist, red, serous drainage, painful, surrounding ecchymosis and blanchable erythema.   Tx: Cleansed with Vashe, applied Xeroform and covered with 4x4 foam.      Recommendations:    Bilateral thighs Cleanse with Vashe, apply xeroform to open areas and hydrogel to viable skin flaps, cover with mepilex transfer. Change daily.   Right lower abdomen  Cleanse with Vashe, apply Xeroform, cover with Mepilex transfer.   Right elbow Cleanse with Vashe, apply Xeroform, cover with rolled gauze.   Left forearm Cleanse with

## 2024-06-24 NOTE — PLAN OF CARE
Problem: Physical Therapy - Adult  Goal: By Discharge: Performs mobility at highest level of function for planned discharge setting.  See evaluation for individualized goals.  Description: FUNCTIONAL STATUS PRIOR TO ADMISSION: Patient reports being Mod I with RW or walls inside the home and single (?) axillary crutch while outside in the yard. He reports only 1 fall about a year ago.    HOME SUPPORT PRIOR TO ADMISSION: Patient lives alone with his cat. He has a local daughter and a \"cleaning lady\" that provides assistance with grocery shopping and other needs if they arise.    Physical Therapy Goals  Initiated 6/23/2024  1.  Patient will move from supine to sit and sit to supine, scoot up and down, and roll side to side in bed with contact guard assist within 7 day(s).    2.  Patient will perform sit to stand with contact guard assist within 7 day(s).  3.  Patient will transfer from bed to chair and chair to bed with contact guard assist using the least restrictive device within 7 day(s).  4.  Patient will ambulate with contact guard assist for 50 feet with the least restrictive device within 7 day(s).   5.  Patient will ascend/descend 3 stairs with 0 handrail(s) with minimal assistance within 7 day(s).   Outcome: Progressing     PHYSICAL THERAPY TREATMENT    Patient: Ruperto Pantoja (89 y.o. male)  Date: 6/24/2024  Diagnosis: Syncope and collapse [R55] Syncope and collapse      Precautions: Weight Bearing, Skin, Fall Risk       Left Upper Extremity Weight Bearing: Non Weight Bearing       Required Braces or Orthoses  Left Upper Extremity Brace/Splint: Sling      ASSESSMENT:  Patient is 89 year old male s/p fall with left distal clavicle fracture and recommendations for LUE sling and NWB left UE. Patient needing cues/assist for sling management. He required assist to lift trunk from bed and transition into sitting. He was hypotensive in sitting. Provided patient with hemiwalker and he briefly stood from bed with  patient's plan of care was discussed with: occupational therapist, registered nurse, and     Patient Education  Education Given To: Patient  Education Provided: Role of Therapy;Plan of Care;Precautions;Equipment;Transfer Training  Education Method: Verbal  Barriers to Learning: Hearing  Education Outcome: Continued education needed      Danae Jean PT  Minutes: 25

## 2024-06-24 NOTE — CARE COORDINATION
Care Management Initial Assessment Note:        06/24/24 1208   Service Assessment   Patient Orientation Alert and Oriented   Cognition Alert   History Provided By Patient   Primary Caregiver Other (Comment)  (daughter assists and has a house keeper)   Support Systems Children   Patient's Healthcare Decision Maker is: Legal Next of Kin   PCP Verified by CM Yes   Last Visit to PCP Within last 6 months   Prior Functional Level Independent in ADLs/IADLs   Current Functional Level Independent in ADLs/IADLs   Can patient return to prior living arrangement Yes   Ability to make needs known: Good   Family able to assist with home care needs: Yes   Would you like for me to discuss the discharge plan with any other family members/significant others, and if so, who? Yes  (daughter Meera Wolfe [phone])   Financial Resources Medicare   Community Resources None   Social/Functional History   Lives With Alone   Type of Home House   Home Layout One level   Home Access Stairs to enter with rails   Entrance Stairs - Number of Steps 3   Entrance Stairs - Rails Both   Bathroom Shower/Tub Walk-in shower   Bathroom Toilet Standard   Bathroom Accessibility Accessible   Home Equipment None   Receives Help From Neighbor;Family   ADL Assistance Independent   Homemaking Assistance   (has house keeper)   Ambulation Assistance Independent   Transfer Assistance Independent   Active  Yes   Mode of Transportation Car   Occupation Retired   Discharge Planning   Type of Residence House   Living Arrangements Alone   Current Services Prior To Admission None   Potential Assistance Needed N/A   DME Ordered? No   Potential Assistance Purchasing Medications No   Type of Home Care Services None   Patient expects to be discharged to: House   One/Two Story Residence One story   History of falls? 1   Services At/After Discharge   Transition of Care Consult (CM Consult) Other  (recs for HH vs IPR, patient and family decline both)   Services

## 2024-06-25 LAB
ANION GAP SERPL CALC-SCNC: 5 MMOL/L (ref 5–15)
BUN SERPL-MCNC: 30 MG/DL (ref 6–20)
BUN/CREAT SERPL: 21 (ref 12–20)
CALCIUM SERPL-MCNC: 7.6 MG/DL (ref 8.5–10.1)
CHLORIDE SERPL-SCNC: 109 MMOL/L (ref 97–108)
CO2 SERPL-SCNC: 23 MMOL/L (ref 21–32)
CREAT SERPL-MCNC: 1.45 MG/DL (ref 0.7–1.3)
ERYTHROCYTE [DISTWIDTH] IN BLOOD BY AUTOMATED COUNT: 13.9 % (ref 11.5–14.5)
GLUCOSE BLD STRIP.AUTO-MCNC: 128 MG/DL (ref 65–117)
GLUCOSE BLD STRIP.AUTO-MCNC: 140 MG/DL (ref 65–117)
GLUCOSE BLD STRIP.AUTO-MCNC: 181 MG/DL (ref 65–117)
GLUCOSE BLD STRIP.AUTO-MCNC: 192 MG/DL (ref 65–117)
GLUCOSE SERPL-MCNC: 141 MG/DL (ref 65–100)
HCT VFR BLD AUTO: 35.1 % (ref 36.6–50.3)
HGB BLD-MCNC: 11.4 G/DL (ref 12.1–17)
MAGNESIUM SERPL-MCNC: 2.1 MG/DL (ref 1.6–2.4)
MCH RBC QN AUTO: 28.6 PG (ref 26–34)
MCHC RBC AUTO-ENTMCNC: 32.5 G/DL (ref 30–36.5)
MCV RBC AUTO: 88.2 FL (ref 80–99)
NRBC # BLD: 0 K/UL (ref 0–0.01)
NRBC BLD-RTO: 0 PER 100 WBC
PLATELET # BLD AUTO: 115 K/UL (ref 150–400)
PMV BLD AUTO: 11.8 FL (ref 8.9–12.9)
POTASSIUM SERPL-SCNC: 3.5 MMOL/L (ref 3.5–5.1)
RBC # BLD AUTO: 3.98 M/UL (ref 4.1–5.7)
SERVICE CMNT-IMP: ABNORMAL
SODIUM SERPL-SCNC: 137 MMOL/L (ref 136–145)
WBC # BLD AUTO: 12.1 K/UL (ref 4.1–11.1)

## 2024-06-25 PROCEDURE — 83735 ASSAY OF MAGNESIUM: CPT

## 2024-06-25 PROCEDURE — 85027 COMPLETE CBC AUTOMATED: CPT

## 2024-06-25 PROCEDURE — 94761 N-INVAS EAR/PLS OXIMETRY MLT: CPT

## 2024-06-25 PROCEDURE — 97535 SELF CARE MNGMENT TRAINING: CPT

## 2024-06-25 PROCEDURE — 51798 US URINE CAPACITY MEASURE: CPT

## 2024-06-25 PROCEDURE — 2060000000 HC ICU INTERMEDIATE R&B

## 2024-06-25 PROCEDURE — 2580000003 HC RX 258: Performed by: INTERNAL MEDICINE

## 2024-06-25 PROCEDURE — 6370000000 HC RX 637 (ALT 250 FOR IP): Performed by: INTERNAL MEDICINE

## 2024-06-25 PROCEDURE — 97530 THERAPEUTIC ACTIVITIES: CPT

## 2024-06-25 PROCEDURE — 80048 BASIC METABOLIC PNL TOTAL CA: CPT

## 2024-06-25 PROCEDURE — 51701 INSERT BLADDER CATHETER: CPT

## 2024-06-25 PROCEDURE — 82962 GLUCOSE BLOOD TEST: CPT

## 2024-06-25 PROCEDURE — 2580000003 HC RX 258: Performed by: STUDENT IN AN ORGANIZED HEALTH CARE EDUCATION/TRAINING PROGRAM

## 2024-06-25 PROCEDURE — 6360000002 HC RX W HCPCS: Performed by: STUDENT IN AN ORGANIZED HEALTH CARE EDUCATION/TRAINING PROGRAM

## 2024-06-25 PROCEDURE — 36415 COLL VENOUS BLD VENIPUNCTURE: CPT

## 2024-06-25 RX ORDER — 0.9 % SODIUM CHLORIDE 0.9 %
500 INTRAVENOUS SOLUTION INTRAVENOUS ONCE
Status: COMPLETED | OUTPATIENT
Start: 2024-06-25 | End: 2024-06-25

## 2024-06-25 RX ADMIN — ATORVASTATIN CALCIUM 20 MG: 20 TABLET, FILM COATED ORAL at 08:59

## 2024-06-25 RX ADMIN — GABAPENTIN 100 MG: 100 CAPSULE ORAL at 08:58

## 2024-06-25 RX ADMIN — METRONIDAZOLE 500 MG: 500 INJECTION, SOLUTION INTRAVENOUS at 01:49

## 2024-06-25 RX ADMIN — APIXABAN 2.5 MG: 2.5 TABLET, FILM COATED ORAL at 22:13

## 2024-06-25 RX ADMIN — WATER 2000 MG: 1 INJECTION INTRAMUSCULAR; INTRAVENOUS; SUBCUTANEOUS at 16:31

## 2024-06-25 RX ADMIN — METRONIDAZOLE 500 MG: 500 INJECTION, SOLUTION INTRAVENOUS at 17:19

## 2024-06-25 RX ADMIN — SODIUM CHLORIDE, POTASSIUM CHLORIDE, SODIUM LACTATE AND CALCIUM CHLORIDE: 600; 310; 30; 20 INJECTION, SOLUTION INTRAVENOUS at 01:47

## 2024-06-25 RX ADMIN — INSULIN GLARGINE 10 UNITS: 100 INJECTION, SOLUTION SUBCUTANEOUS at 09:00

## 2024-06-25 RX ADMIN — APIXABAN 2.5 MG: 2.5 TABLET, FILM COATED ORAL at 08:59

## 2024-06-25 RX ADMIN — SODIUM CHLORIDE, POTASSIUM CHLORIDE, SODIUM LACTATE AND CALCIUM CHLORIDE: 600; 310; 30; 20 INJECTION, SOLUTION INTRAVENOUS at 22:22

## 2024-06-25 RX ADMIN — SODIUM CHLORIDE 500 ML: 9 INJECTION, SOLUTION INTRAVENOUS at 15:23

## 2024-06-25 RX ADMIN — METRONIDAZOLE 500 MG: 500 INJECTION, SOLUTION INTRAVENOUS at 09:00

## 2024-06-25 RX ADMIN — GABAPENTIN 100 MG: 100 CAPSULE ORAL at 22:12

## 2024-06-25 RX ADMIN — SODIUM CHLORIDE, PRESERVATIVE FREE 10 ML: 5 INJECTION INTRAVENOUS at 22:13

## 2024-06-25 NOTE — PLAN OF CARE
Problem: Occupational Therapy - Adult  Goal: By Discharge: Performs self-care activities at highest level of function for planned discharge setting.  See evaluation for individualized goals.  Description: FUNCTIONAL STATUS PRIOR TO ADMISSION:  Patient was ambulatory using a rolling walker in the house and crutch on right outside of house  Receives Help From: Family, Friend(s) (daughter and cleaning lady help him), ADL Assistance: Independent,  ,  ,  ,  ,  , Homemaking Assistance: Needs assistance,  , Transfer Assistance: Independent (uses walker in the house and crutch outside of the house), Active : Yes     HOME SUPPORT: Patient lived alone with daughter and cleaning lady to provide assistance.    Occupational Therapy Goals:  Initiated 6/23/2024  1.  Patient will perform static standing without UE support > or = 3 minutes with Contact Guard Assist within 7 day(s).  2.  Patient will perform upper body dressing with Minimal Assist including sling management within 7 day(s).  3.  Patient will perform lower body dressing with Minimal Assist and best technique/adaptive equipment within 7 day(s).  4.  Patient will perform toilet transfers with Minimal Assist into and out of bathroom with recommended adaptive equipment within 7 day(s).  5.  Patient will perform all aspects of toileting with Moderate Assist within 7 day(s).  6.  Patient will adhere to NWB left UE during functional transfers without cues within 7 day(s).   Outcome: Progressing   OCCUPATIONAL THERAPY TREATMENT  Patient: Ruperto Pantoja (89 y.o. male)  Date: 6/25/2024  Primary Diagnosis: Syncope and collapse [R55]       Precautions: Weight Bearing, Skin, Fall Risk       Left Upper Extremity Weight Bearing: Non Weight Bearing        Chart, occupational therapy assessment, plan of care, and goals were reviewed.    ASSESSMENT  Patient continues to benefit from skilled OT services and is progressing towards goals. Patient received in bed, sling not in

## 2024-06-25 NOTE — WOUND CARE
Wound Care Follow up:     Seen in B309/01  Follow up from yesterdays visit to assess sacrum due to concern listed in wound consult.     Assessment:   Patient alert and communicative, required 1 assist to turn for assessment.   Surface: Jayleen bed with CHRISTINA mattress    1. POA Sacrum  Trauma: dry, healing ecchymosis, maroon/ purple with surrounding yellow, painful, no open area, no erythema.  Tx: Cleansed with soap and water, covered with sacral foam for protection.       Recommendations:      Turn/reposition approximately every 2 hours  Offload heels with heels hanging off end of pillow at all times while in bed.  Sacral Foam dressing: lift to assess regularly; change as needed. Discontinue if incontinence is frequently soiling dressing.     Z-guard cream to buttocks and sacrum daily and as needed with incontinence care  Low Air Loss mattress: Use only flat sheet and one incontinence pad.     No concerns to relay to provider.    Transition of Care: Plan to follow weekly and as needed while admitted to hospital.    Marnie Simpson RN  [facility] Inpatient Wound Care Department  Office [phone]  Available via [employer]

## 2024-06-25 NOTE — CARE COORDINATION
Care Management Progress Note:      06/25/24 1101   Condition of Participation: Discharge Planning   The Patient and/or Patient Representative was provided with a Choice of Provider? Patient   The Patient and/Or Patient Representative agree with the Discharge Plan? Yes   Freedom of Choice list was provided with basic dialogue that supports the patient's individualized plan of care/goals, treatment preferences, and shares the quality data associated with the providers?  Yes     CM met with patient to discuss again recs for IPR. Patient now amenable to rehab as he states \"I can't go home if I can't walk\". Patient with no preference as to hospital, CM sent referral to [facility]. CM following.  ______________________  Octavia PLASCENCIA, RN  Care Management  6/25/2024  11:06 AM

## 2024-06-25 NOTE — PROGRESS NOTES
6/25/24 at 0907 message sent to the provider the patient bp 105/61  and at 0357 the bp was 95/57 metoprolol held please advise   6/245/24 Dr Paula advised to hold the medication

## 2024-06-25 NOTE — PROGRESS NOTES
Physician Progress Note      PATIENT:               BRAD THOMAS  [facility] #:                  [id number]  :                       1924  ADMIT DATE:       2024 2:50 PM  DISCH DATE:  RESPONDING  PROVIDER #:        Willis Vargas MD          QUERY TEXT:    Patient admitted with dehydration. Noted documentation of sepsis and shock in   H/P  In order to support the diagnosis of sepsis, please include additional   clinical indicators in your documentation.  Or please document if the   diagnosis of sepsis has been ruled out after further study    The medical record reflects the following:  Risk Factors: dehydration  Clinical Indicators:  H/P  Septic shock of unknown origin: POA. Acute. SIRS 2/4 with fever, bands. Could   be explained by heat exhaustion - and most likely is. LA>4. No source = Pan   scan was w/o infection, UA neg and COVID neg.    24 15:25  Lactic Acid, Plasma: 4.8 (HH)    24 17:36  Lactic Acid, Plasma: 3.2 (HH)    24 04:01  WBC: 14.9 (H)    Treatment: rocephin IV, vibramycin IV, 2.5 lts IVF bolus    Thank you  Rhiannon Welch RN, CDI, CCDS, CRCR  Certified  Clinical Documentation   O: [phone]  [email or web address]  I can also be reached by perfect serve  Options provided:  -- Sepsis and septic shock was ruled out after study  -- Sepsis and septic shock present as evidenced by, Please document evidence.  -- Other - I will add my own diagnosis  -- Disagree - Not applicable / Not valid  -- Disagree - Clinically unable to determine / Unknown  -- Refer to Clinical Documentation Reviewer    PROVIDER RESPONSE TEXT:    Sepsis is present as evidenced by SIRS, infection, lactic acidosis > 4    Query created by: Rhiannon Welch on 2024 1:17 PM      Electronically signed by:  Willis Vargas MD 2024 8:51 AM

## 2024-06-25 NOTE — PLAN OF CARE
Problem: Physical Therapy - Adult  Goal: By Discharge: Performs mobility at highest level of function for planned discharge setting.  See evaluation for individualized goals.  Description: FUNCTIONAL STATUS PRIOR TO ADMISSION: Patient reports being Mod I with RW or walls inside the home and single (?) axillary crutch while outside in the yard. He reports only 1 fall about a year ago.    HOME SUPPORT PRIOR TO ADMISSION: Patient lives alone with his cat. He has a local daughter and a \"cleaning lady\" that provides assistance with grocery shopping and other needs if they arise.    Physical Therapy Goals  Initiated 6/23/2024  1.  Patient will move from supine to sit and sit to supine, scoot up and down, and roll side to side in bed with contact guard assist within 7 day(s).    2.  Patient will perform sit to stand with contact guard assist within 7 day(s).  3.  Patient will transfer from bed to chair and chair to bed with contact guard assist using the least restrictive device within 7 day(s).  4.  Patient will ambulate with contact guard assist for 50 feet with the least restrictive device within 7 day(s).   5.  Patient will ascend/descend 3 stairs with 0 handrail(s) with minimal assistance within 7 day(s).   Outcome: Progressing     PHYSICAL THERAPY TREATMENT    Patient: Ruperto Pantoja (89 y.o. male)  Date: 6/25/2024  Diagnosis: Syncope and collapse [R55] Syncope and collapse      Precautions: Weight Bearing, Skin, Fall Risk       Left Upper Extremity Weight Bearing: Non Weight Bearing       Required Braces or Orthoses  Left Upper Extremity Brace/Splint: Sling      ASSESSMENT:  Patient continues to benefit from skilled PT services and is slowly progressing towards goals. Patient found laying in bed without left UE sling in place, stating he prefers to allow it to rest at his side. He states he understands he should be avoiding left shoulder movements but is observed to move left UE throughout session despite

## 2024-06-25 NOTE — PROGRESS NOTES
Hospitalist Progress Note    NAME: Ruperto Pantoja   :  1924   MRN:  [id number]     Date/Time:  2024 8:43 AM    Patient PCP: Brooks Goldman, APRN - NP       Subjective:       VITALS:    Vitals:    24 0733   BP: 105/61   Pulse: 50   Resp: 16   Temp: 97.9 °F (36.6 °C)   SpO2: 98%       PHYSICAL EXAM:      General:   No acute distress, resting comfortably in bed.  Heart:  RRR, No MRG  Lungs:  CTAB.  Non-labored breathing.  Abdomen:  Soft .  Nondistended.  NTTP.  BS present.  Extremities: 1+ bilateral lower extremity edema, wounds present  Skin:  No rash  Neuro:  Alert and interactive.  No focal deficits.  Generalized weakness  Psych: Mood stable.        LAB DATA REVIEWED:    Recent Results (from the past 24 hour(s))   POCT Glucose    Collection Time: 24 12:11 PM   Result Value Ref Range    POC Glucose 154 (H) 65 - 117 mg/dL    Performed by: Finesse Morenosten    Echo (TTE) complete (PRN contrast/bubble/strain/3D)    Collection Time: 24  2:41 PM   Result Value Ref Range    Body Surface Area 1.91 m2    IVSd 0.9 0.6 - 1.0 cm    LVIDd 3.8 (A) 4.2 - 5.9 cm    LVIDs 3.4 cm    LVOT Diameter 2.1 cm    LVPWd 1.4 (A) 0.6 - 1.0 cm    EF BP 52 (A) 55 - 100 %    LV Ejection Fraction A2C 47 %    LV Ejection Fraction A4C 56 %    LV EDV A2C 81 mL    LV EDV A4C 61 mL    LV EDV BP 73 67 - 155 mL    LV ESV A2C 43 mL    LV ESV A4C 27 mL    LV ESV BP 35 22 - 58 mL    LVOT Peak Gradient 4 mmHg    LVOT Mean Gradient 2 mmHg    LVOT SV 63.4 ml    LVOT Peak Velocity 1.0 m/s    LVOT VTI 18.3 cm    LA Diameter 2.6 cm    AV Area by Peak Velocity 2.1 cm2    AV Area by VTI 2.0 cm2    AV Peak Gradient 10 mmHg    AV Mean Gradient 5 mmHg    AV Peak Velocity 1.6 m/s    AV Mean Velocity 1.1 m/s    AV VTI 30.7 cm    MV A Velocity 0.75 m/s    MV E Wave Deceleration Time 300.3 ms    MV E Velocity 0.66 m/s    LV E' Lateral Velocity 4 cm/s    LV E' Septal Velocity 8 cm/s    MV Peak Gradient 3 mmHg    MV Mean Gradient 1 mmHg

## 2024-06-25 NOTE — PROGRESS NOTES
1400 Notified Chai MENA that patient has no urine output for dayshift. Attempted to bladder scan patient around 1300, 0 mL showing.    1827 Notified Chai MENA that pt has had zero urine output since receiving a 500 mL NS bolus. Per Chai MENA, straight cath.     1845 600 mL output from straight cath, Chai MENA notified. Waiting for further orders.

## 2024-06-26 LAB
GLUCOSE BLD STRIP.AUTO-MCNC: 103 MG/DL (ref 65–117)
GLUCOSE BLD STRIP.AUTO-MCNC: 111 MG/DL (ref 65–117)
GLUCOSE BLD STRIP.AUTO-MCNC: 135 MG/DL (ref 65–117)
GLUCOSE BLD STRIP.AUTO-MCNC: 145 MG/DL (ref 65–117)
SERVICE CMNT-IMP: ABNORMAL
SERVICE CMNT-IMP: ABNORMAL
SERVICE CMNT-IMP: NORMAL
SERVICE CMNT-IMP: NORMAL

## 2024-06-26 PROCEDURE — 6370000000 HC RX 637 (ALT 250 FOR IP): Performed by: INTERNAL MEDICINE

## 2024-06-26 PROCEDURE — 2580000003 HC RX 258: Performed by: STUDENT IN AN ORGANIZED HEALTH CARE EDUCATION/TRAINING PROGRAM

## 2024-06-26 PROCEDURE — 1100000000 HC RM PRIVATE

## 2024-06-26 PROCEDURE — 51798 US URINE CAPACITY MEASURE: CPT

## 2024-06-26 PROCEDURE — 97110 THERAPEUTIC EXERCISES: CPT

## 2024-06-26 PROCEDURE — 82962 GLUCOSE BLOOD TEST: CPT

## 2024-06-26 PROCEDURE — 6370000000 HC RX 637 (ALT 250 FOR IP): Performed by: STUDENT IN AN ORGANIZED HEALTH CARE EDUCATION/TRAINING PROGRAM

## 2024-06-26 PROCEDURE — 94761 N-INVAS EAR/PLS OXIMETRY MLT: CPT

## 2024-06-26 PROCEDURE — 6360000002 HC RX W HCPCS: Performed by: STUDENT IN AN ORGANIZED HEALTH CARE EDUCATION/TRAINING PROGRAM

## 2024-06-26 RX ORDER — TAMSULOSIN HYDROCHLORIDE 0.4 MG/1
0.4 CAPSULE ORAL DAILY
Status: DISCONTINUED | OUTPATIENT
Start: 2024-06-26 | End: 2024-06-27 | Stop reason: HOSPADM

## 2024-06-26 RX ORDER — POLYETHYLENE GLYCOL 3350 17 G/17G
17 POWDER, FOR SOLUTION ORAL 2 TIMES DAILY
Status: DISCONTINUED | OUTPATIENT
Start: 2024-06-26 | End: 2024-06-27 | Stop reason: HOSPADM

## 2024-06-26 RX ORDER — LACTULOSE 10 G/15ML
20 SOLUTION ORAL 2 TIMES DAILY
Status: COMPLETED | OUTPATIENT
Start: 2024-06-26 | End: 2024-06-26

## 2024-06-26 RX ADMIN — POLYETHYLENE GLYCOL 3350 17 G: 17 POWDER, FOR SOLUTION ORAL at 20:23

## 2024-06-26 RX ADMIN — LACTULOSE 20 G: 20 SOLUTION ORAL at 20:23

## 2024-06-26 RX ADMIN — METRONIDAZOLE 500 MG: 500 INJECTION, SOLUTION INTRAVENOUS at 01:28

## 2024-06-26 RX ADMIN — INSULIN GLARGINE 10 UNITS: 100 INJECTION, SOLUTION SUBCUTANEOUS at 09:45

## 2024-06-26 RX ADMIN — SODIUM CHLORIDE, PRESERVATIVE FREE 10 ML: 5 INJECTION INTRAVENOUS at 09:42

## 2024-06-26 RX ADMIN — SODIUM CHLORIDE, PRESERVATIVE FREE 10 ML: 5 INJECTION INTRAVENOUS at 20:16

## 2024-06-26 RX ADMIN — LACTULOSE 20 G: 20 SOLUTION ORAL at 16:44

## 2024-06-26 RX ADMIN — METRONIDAZOLE 500 MG: 500 INJECTION, SOLUTION INTRAVENOUS at 16:43

## 2024-06-26 RX ADMIN — APIXABAN 2.5 MG: 2.5 TABLET, FILM COATED ORAL at 09:41

## 2024-06-26 RX ADMIN — GABAPENTIN 100 MG: 100 CAPSULE ORAL at 21:00

## 2024-06-26 RX ADMIN — APIXABAN 2.5 MG: 2.5 TABLET, FILM COATED ORAL at 21:00

## 2024-06-26 RX ADMIN — WATER 2000 MG: 1 INJECTION INTRAMUSCULAR; INTRAVENOUS; SUBCUTANEOUS at 16:44

## 2024-06-26 RX ADMIN — SODIUM CHLORIDE, POTASSIUM CHLORIDE, SODIUM LACTATE AND CALCIUM CHLORIDE: 600; 310; 30; 20 INJECTION, SOLUTION INTRAVENOUS at 23:18

## 2024-06-26 RX ADMIN — GABAPENTIN 100 MG: 100 CAPSULE ORAL at 09:41

## 2024-06-26 RX ADMIN — TAMSULOSIN HYDROCHLORIDE 0.4 MG: 0.4 CAPSULE ORAL at 16:43

## 2024-06-26 RX ADMIN — ATORVASTATIN CALCIUM 20 MG: 20 TABLET, FILM COATED ORAL at 09:42

## 2024-06-26 RX ADMIN — POLYETHYLENE GLYCOL 3350 17 G: 17 POWDER, FOR SOLUTION ORAL at 16:45

## 2024-06-26 RX ADMIN — SODIUM CHLORIDE, POTASSIUM CHLORIDE, SODIUM LACTATE AND CALCIUM CHLORIDE: 600; 310; 30; 20 INJECTION, SOLUTION INTRAVENOUS at 10:03

## 2024-06-26 RX ADMIN — METRONIDAZOLE 500 MG: 500 INJECTION, SOLUTION INTRAVENOUS at 09:54

## 2024-06-26 RX ADMIN — POLYETHYLENE GLYCOL 3350 17 G: 17 POWDER, FOR SOLUTION ORAL at 12:01

## 2024-06-26 ASSESSMENT — PAIN SCALES - GENERAL: PAINLEVEL_OUTOF10: 0

## 2024-06-26 NOTE — PLAN OF CARE
Problem: Safety - Adult  Goal: Free from fall injury  Outcome: Progressing     Problem: Discharge Planning  Goal: Discharge to home or other facility with appropriate resources  Outcome: Progressing     Problem: Chronic Conditions and Co-morbidities  Goal: Patient's chronic conditions and co-morbidity symptoms are monitored and maintained or improved  Outcome: Progressing     Problem: Pain  Goal: Verbalizes/displays adequate comfort level or baseline comfort level  Outcome: Progressing

## 2024-06-26 NOTE — PROGRESS NOTES
Received transfer order for room 380. Report called to JOSE MIGUEL Beverly. Reviewed patients history, reason for admission, labs and plan. Urinary retention today. Received order from Dr. Paula to insert a andres catheter. Coude 16fr andres catheter placed and patient transferred to new bed 380.   JOSE MIGUEL Sanchez

## 2024-06-26 NOTE — PLAN OF CARE
Problem: Physical Therapy - Adult  Goal: By Discharge: Performs mobility at highest level of function for planned discharge setting.  See evaluation for individualized goals.  Description: FUNCTIONAL STATUS PRIOR TO ADMISSION: Patient reports being Mod I with RW or walls inside the home and single (?) axillary crutch while outside in the yard. He reports only 1 fall about a year ago.    HOME SUPPORT PRIOR TO ADMISSION: Patient lives alone with his cat. He has a local daughter and a \"cleaning lady\" that provides assistance with grocery shopping and other needs if they arise.    Physical Therapy Goals  Initiated 6/23/2024  1.  Patient will move from supine to sit and sit to supine, scoot up and down, and roll side to side in bed with contact guard assist within 7 day(s).    2.  Patient will perform sit to stand with contact guard assist within 7 day(s).  3.  Patient will transfer from bed to chair and chair to bed with contact guard assist using the least restrictive device within 7 day(s).  4.  Patient will ambulate with contact guard assist for 50 feet with the least restrictive device within 7 day(s).   5.  Patient will ascend/descend 3 stairs with 0 handrail(s) with minimal assistance within 7 day(s).   Outcome: Progressing     PHYSICAL THERAPY TREATMENT    Patient: Ruperto Pantoja (89 y.o. male)  Date: 6/26/2024  Diagnosis: Syncope and collapse [R55] Syncope and collapse      Precautions: Weight Bearing, Skin, Fall Risk       Left Upper Extremity Weight Bearing: Non Weight Bearing       Required Braces or Orthoses  Left Upper Extremity Brace/Splint: Sling      ASSESSMENT:  Patient continues to benefit from skilled PT services and is slowly progressing towards goals. Patient received laying in bed reporting he received medications for constipation but is not ready yet for assistance. Patient encouraged to participate in lower extremity AROM exercises for strengthening at this time in bed. He did not tolerate

## 2024-06-26 NOTE — PROGRESS NOTES
Occupational Therapy Note: Pt stating his legs hurt when he moves and did not want to sit edge of bed. He stated he can't get his head comfortable bed level. Changed pillow to thinner one and pt seemed happy with that. Will cont to follow.   Plan: Negative for Demodex on KOH. Follow up 6 weeks Detail Level: Zone Initiate Treatment: Clindamycin lotion apply qhs \\nFinacea apply to face q am \\nDoxycycline 100mg 1 PO BID Samples Given: Finacea

## 2024-06-26 NOTE — CARE COORDINATION
Care Management Progress Note:   CM followed up with [facility] abad [place]. Patient accepted by [facility] for therapy at [place]. Bed available 6/27 or 6/28 per Laila. CM following, dispo is IPR @ [facility] pending bed availability.  ______________________  Octavia PLASCENCIA, RN  Care Management  6/26/2024  1:13 PM

## 2024-06-26 NOTE — PROGRESS NOTES
Hospitalist Progress Note    NAME: Ruperto Pantoja   :  1924   MRN:  [id number]     Date/Time:  2024 6:20 PM    Patient PCP: Brooks Goldman APRN - NAVEED       Subjective:       Patient with recurrent urinary retention, may require straight cath insertion or Pa.  Consult urology.  Begin Flomax.  No fever.    VITALS:    Vitals:    24 1725   BP: 129/60   Pulse: 66   Resp: 18   Temp: 97.3 °F (36.3 °C)   SpO2: 99%       PHYSICAL EXAM:      General:   No acute distress, resting comfortably in bed.  Heart:  RRR, No MRG  Lungs:  CTAB.  Non-labored breathing.  Abdomen:  Soft .  Nondistended.  NTTP.  BS present.  Extremities: 1+ bilateral lower extremity edema, wounds present  Skin:  No rash  Neuro:  Alert and interactive.  No focal deficits.  Generalized weakness  Psych: Mood stable.        LAB DATA REVIEWED:    Recent Results (from the past 24 hour(s))   POCT Glucose    Collection Time: 24 11:03 PM   Result Value Ref Range    POC Glucose 128 (H) 65 - 117 mg/dL    Performed by: MICHELLE Thomas RN    POCT Glucose    Collection Time: 24  7:41 AM   Result Value Ref Range    POC Glucose 103 65 - 117 mg/dL    Performed by: Remi Dickson (THERESE)    POCT Glucose    Collection Time: 24 11:15 AM   Result Value Ref Range    POC Glucose 145 (H) 65 - 117 mg/dL    Performed by: Remi Dickson (THERESE)    POCT Glucose    Collection Time: 24  3:54 PM   Result Value Ref Range    POC Glucose 111 65 - 117 mg/dL    Performed by: Remi Dickson (THERESE)          IMAGING DATA REVIEWED:    XR SHOULDER LEFT (MIN 2 VIEWS)   Final Result      Acute distal clavicle fracture.      Electronically signed by Araceli Horvath      CT CHEST ABDOMEN PELVIS W CONTRAST Additional Contrast? None   Final Result         1. Small left pleural effusion otherwise no acute abnormality in the chest      2. 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 *       89 y.o.   male with PMHx HTN, HLD, afib, DM was brought in via EMS after he was found unconscious outside. It was unclear now long he had been down.     Mechanical fall at home   Dehydration   SIRS   Hypotension, fever documented upto 103 °F   Marginally elevated flat trend of HS Troponin in a 89 yo with above finding leads us to no particular etiology.   IVF to continue   Monitor CBC, BMP daily   Empiric IV Abx as ordered - we will de-escalate abx soon   Appreciate cardiology teams assistance.   Resume diet   Lowered dose of beta blocker with hold parameters      Recurrent urinary retention  -Straight cath.  May need Pa.  Start Flomax.  Consult urology    T2DM -  Resume diet   SSI   Resume Lantus as was PTA      Hyperlipidemia -chronic   Continue statin with pharmacy substitution      Afib-chronic   Rate controlled - continue beta blocker, if BP allows   Continue full anticoagulation.      HTN -chronic   Holding HCTZ   Beta blocker as above      Traumatic wounds -  Standard nursing care      Debility -reportedly worsened since GB surgery   PT OT to see him.   I anticipate he will return home with possibly home health and family support          DVT ppx: Eliquis    Dispo: To be determined      CODE STATUS:  FULL CODE        Risk of deterioration: high         Total time spent with patient care: 35 minutes.  I personally saw and examined the patient during this time period.    Critical care: No

## 2024-06-26 NOTE — PROGRESS NOTES
No void. Patient state, \"I feel bloated and constipated, miserable\". Bladder scanned 400 and 501ml. Reported to Dr. Paula. Straight cathed 450ml dark alexandria consentrated urine.  JOSE MIGUEL Sanchez

## 2024-06-27 VITALS
RESPIRATION RATE: 18 BRPM | WEIGHT: 170 LBS | TEMPERATURE: 98.5 F | OXYGEN SATURATION: 100 % | BODY MASS INDEX: 26.68 KG/M2 | HEIGHT: 67 IN | DIASTOLIC BLOOD PRESSURE: 57 MMHG | HEART RATE: 64 BPM | SYSTOLIC BLOOD PRESSURE: 131 MMHG

## 2024-06-27 LAB
ANION GAP SERPL CALC-SCNC: 5 MMOL/L (ref 5–15)
BACTERIA SPEC CULT: NORMAL
BACTERIA SPEC CULT: NORMAL
BASOPHILS # BLD: 0.1 K/UL (ref 0–0.1)
BASOPHILS NFR BLD: 1 % (ref 0–1)
BUN SERPL-MCNC: 18 MG/DL (ref 6–20)
BUN/CREAT SERPL: 16 (ref 12–20)
CALCIUM SERPL-MCNC: 7.8 MG/DL (ref 8.5–10.1)
CHLORIDE SERPL-SCNC: 109 MMOL/L (ref 97–108)
CO2 SERPL-SCNC: 25 MMOL/L (ref 21–32)
CREAT SERPL-MCNC: 1.13 MG/DL (ref 0.7–1.3)
DIFFERENTIAL METHOD BLD: ABNORMAL
EOSINOPHIL # BLD: 0.2 K/UL (ref 0–0.4)
EOSINOPHIL NFR BLD: 3 % (ref 0–7)
ERYTHROCYTE [DISTWIDTH] IN BLOOD BY AUTOMATED COUNT: 14.4 % (ref 11.5–14.5)
GLUCOSE BLD STRIP.AUTO-MCNC: 148 MG/DL (ref 65–117)
GLUCOSE BLD STRIP.AUTO-MCNC: 153 MG/DL (ref 65–117)
GLUCOSE SERPL-MCNC: 159 MG/DL (ref 65–100)
HCT VFR BLD AUTO: 34.6 % (ref 36.6–50.3)
HGB BLD-MCNC: 11.2 G/DL (ref 12.1–17)
IMM GRANULOCYTES # BLD AUTO: 0.2 K/UL (ref 0–0.04)
IMM GRANULOCYTES NFR BLD AUTO: 2 % (ref 0–0.5)
LYMPHOCYTES # BLD: 1 K/UL (ref 0.8–3.5)
LYMPHOCYTES NFR BLD: 11 % (ref 12–49)
MAGNESIUM SERPL-MCNC: 2 MG/DL (ref 1.6–2.4)
MCH RBC QN AUTO: 29.2 PG (ref 26–34)
MCHC RBC AUTO-ENTMCNC: 32.4 G/DL (ref 30–36.5)
MCV RBC AUTO: 90.1 FL (ref 80–99)
MONOCYTES # BLD: 0.9 K/UL (ref 0–1)
MONOCYTES NFR BLD: 10 % (ref 5–13)
NEUTS SEG # BLD: 7 K/UL (ref 1.8–8)
NEUTS SEG NFR BLD: 74 % (ref 32–75)
NRBC # BLD: 0 K/UL (ref 0–0.01)
NRBC BLD-RTO: 0 PER 100 WBC
PLATELET # BLD AUTO: 132 K/UL (ref 150–400)
PMV BLD AUTO: 11.3 FL (ref 8.9–12.9)
POTASSIUM SERPL-SCNC: 3.6 MMOL/L (ref 3.5–5.1)
RBC # BLD AUTO: 3.84 M/UL (ref 4.1–5.7)
SERVICE CMNT-IMP: ABNORMAL
SERVICE CMNT-IMP: ABNORMAL
SERVICE CMNT-IMP: NORMAL
SERVICE CMNT-IMP: NORMAL
SODIUM SERPL-SCNC: 139 MMOL/L (ref 136–145)
WBC # BLD AUTO: 9.4 K/UL (ref 4.1–11.1)

## 2024-06-27 PROCEDURE — 6370000000 HC RX 637 (ALT 250 FOR IP): Performed by: INTERNAL MEDICINE

## 2024-06-27 PROCEDURE — 2580000003 HC RX 258: Performed by: STUDENT IN AN ORGANIZED HEALTH CARE EDUCATION/TRAINING PROGRAM

## 2024-06-27 PROCEDURE — 36415 COLL VENOUS BLD VENIPUNCTURE: CPT

## 2024-06-27 PROCEDURE — 6360000002 HC RX W HCPCS: Performed by: STUDENT IN AN ORGANIZED HEALTH CARE EDUCATION/TRAINING PROGRAM

## 2024-06-27 PROCEDURE — 80048 BASIC METABOLIC PNL TOTAL CA: CPT

## 2024-06-27 PROCEDURE — 85025 COMPLETE CBC W/AUTO DIFF WBC: CPT

## 2024-06-27 PROCEDURE — 83735 ASSAY OF MAGNESIUM: CPT

## 2024-06-27 PROCEDURE — 94761 N-INVAS EAR/PLS OXIMETRY MLT: CPT

## 2024-06-27 PROCEDURE — 82962 GLUCOSE BLOOD TEST: CPT

## 2024-06-27 RX ORDER — METOPROLOL SUCCINATE 25 MG/1
25 TABLET, EXTENDED RELEASE ORAL DAILY
Qty: 30 TABLET | Refills: 3 | Status: ON HOLD
Start: 2024-06-28

## 2024-06-27 RX ORDER — POLYETHYLENE GLYCOL 3350 17 G/17G
17 POWDER, FOR SOLUTION ORAL DAILY PRN
Qty: 30 PACKET | Refills: 0 | Status: ON HOLD
Start: 2024-06-27 | End: 2024-07-27

## 2024-06-27 RX ORDER — TAMSULOSIN HYDROCHLORIDE 0.4 MG/1
0.4 CAPSULE ORAL DAILY
Qty: 30 CAPSULE | Refills: 3 | Status: ON HOLD
Start: 2024-06-28

## 2024-06-27 RX ORDER — GABAPENTIN 100 MG/1
CAPSULE ORAL
Qty: 270 CAPSULE | Refills: 1 | Status: ON HOLD | OUTPATIENT
Start: 2024-06-27 | End: 2024-12-27

## 2024-06-27 RX ADMIN — ATORVASTATIN CALCIUM 20 MG: 20 TABLET, FILM COATED ORAL at 09:21

## 2024-06-27 RX ADMIN — SODIUM CHLORIDE, PRESERVATIVE FREE 10 ML: 5 INJECTION INTRAVENOUS at 09:20

## 2024-06-27 RX ADMIN — METRONIDAZOLE 500 MG: 500 INJECTION, SOLUTION INTRAVENOUS at 09:23

## 2024-06-27 RX ADMIN — METRONIDAZOLE 500 MG: 500 INJECTION, SOLUTION INTRAVENOUS at 01:47

## 2024-06-27 RX ADMIN — POLYETHYLENE GLYCOL 3350 17 G: 17 POWDER, FOR SOLUTION ORAL at 09:21

## 2024-06-27 RX ADMIN — TAMSULOSIN HYDROCHLORIDE 0.4 MG: 0.4 CAPSULE ORAL at 09:21

## 2024-06-27 RX ADMIN — METOPROLOL SUCCINATE 25 MG: 25 TABLET, EXTENDED RELEASE ORAL at 09:21

## 2024-06-27 RX ADMIN — INSULIN GLARGINE 10 UNITS: 100 INJECTION, SOLUTION SUBCUTANEOUS at 09:21

## 2024-06-27 RX ADMIN — APIXABAN 2.5 MG: 2.5 TABLET, FILM COATED ORAL at 09:21

## 2024-06-27 RX ADMIN — GABAPENTIN 100 MG: 100 CAPSULE ORAL at 09:21

## 2024-06-27 ASSESSMENT — PAIN SCALES - GENERAL
PAINLEVEL_OUTOF10: 0
PAINLEVEL_OUTOF10: 0

## 2024-06-27 NOTE — PROGRESS NOTES
Hospitalist Progress Note    NAME: Ruperto Pantoja   :  1924   MRN:  [id number]     Date/Time:  24  2:29 PM    Patient PCP: Brooks Goldman APRN - NAVEED       Subjective:       VITALS:    Vitals:    24 1102   BP: (!) 131/57   Pulse: 64   Resp: 18   Temp:    SpO2: 100%       PHYSICAL EXAM:      General:   No acute distress, resting comfortably in bed.  Heart:  RRR, No MRG  Lungs:  CTAB.  Non-labored breathing.  Abdomen:  Soft .  Nondistended.  NTTP.  BS present.  Extremities: 1+ bilateral lower extremity edema, wounds present  Skin:  No rash  Neuro:  Alert and interactive.  No focal deficits.  Generalized weakness  Psych: Mood stable.        LAB DATA REVIEWED:    Recent Results (from the past 24 hour(s))   POCT Glucose    Collection Time: 24  3:54 PM   Result Value Ref Range    POC Glucose 111 65 - 117 mg/dL    Performed by: Remi Dickson (THERESE)    POCT Glucose    Collection Time: 24  8:00 PM   Result Value Ref Range    POC Glucose 135 (H) 65 - 117 mg/dL    Performed by: Bobby Gr    CBC with Auto Differential    Collection Time: 24  4:51 AM   Result Value Ref Range    WBC 9.4 4.1 - 11.1 K/uL    RBC 3.84 (L) 4.10 - 5.70 M/uL    Hemoglobin 11.2 (L) 12.1 - 17.0 g/dL    Hematocrit 34.6 (L) 36.6 - 50.3 %    MCV 90.1 80.0 - 99.0 FL    MCH 29.2 26.0 - 34.0 PG    MCHC 32.4 30.0 - 36.5 g/dL    RDW 14.4 11.5 - 14.5 %    Platelets 132 (L) 150 - 400 K/uL    MPV 11.3 8.9 - 12.9 FL    Nucleated RBCs 0.0 0  WBC    nRBC 0.00 0.00 - 0.01 K/uL    Neutrophils % 74 32 - 75 %    Lymphocytes % 11 (L) 12 - 49 %    Monocytes % 10 5 - 13 %    Eosinophils % 3 0 - 7 %    Basophils % 1 0 - 1 %    Immature Granulocytes % 2 (H) 0.0 - 0.5 %    Neutrophils Absolute 7.0 1.8 - 8.0 K/UL    Lymphocytes Absolute 1.0 0.8 - 3.5 K/UL    Monocytes Absolute 0.9 0.0 - 1.0 K/UL    Eosinophils Absolute 0.2 0.0 - 0.4 K/UL    Basophils Absolute 0.1 0.0 - 0.1 K/UL    Immature Granulocytes Absolute 0.2 (H) 0.00 -  (NEURONTIN) capsule 100 mg, 100 mg, Oral, BID  [Held by provider] hydroCHLOROthiazide (HYDRODIURIL) tablet 25 mg, 25 mg, Oral, Daily  insulin glargine (LANTUS) injection vial 10 Units, 10 Units, SubCUTAneous, QAM  metoprolol succinate (TOPROL XL) extended release tablet 25 mg, 25 mg, Oral, Daily  atorvastatin (LIPITOR) tablet 20 mg, 20 mg, Oral, Daily  [Held by provider] alogliptin (NESINA) tablet 12.5 mg, 12.5 mg, Oral, Daily  sodium chloride flush 0.9 % injection 5-40 mL, 5-40 mL, IntraVENous, 2 times per day  sodium chloride flush 0.9 % injection 5-40 mL, 5-40 mL, IntraVENous, PRN  0.9 % sodium chloride infusion, , IntraVENous, PRN  ondansetron (ZOFRAN-ODT) disintegrating tablet 4 mg, 4 mg, Oral, Q8H PRN **OR** ondansetron (ZOFRAN) injection 4 mg, 4 mg, IntraVENous, Q6H PRN  acetaminophen (TYLENOL) tablet 650 mg, 650 mg, Oral, Q6H PRN **OR** acetaminophen (TYLENOL) suppository 650 mg, 650 mg, Rectal, Q6H PRN  lactated ringers IV soln infusion, , IntraVENous, Continuous  metroNIDAZOLE (FLAGYL) 500 mg in 0.9% NaCl 100 mL IVPB premix, 500 mg, IntraVENous, Q8H  cefTRIAXone (ROCEPHIN) 2,000 mg in sterile water 20 mL IV syringe, 2,000 mg, IntraVENous, Q24H  glucose chewable tablet 16 g, 4 tablet, Oral, PRN  dextrose bolus 10% 125 mL, 125 mL, IntraVENous, PRN **OR** dextrose bolus 10% 250 mL, 250 mL, IntraVENous, PRN  glucagon injection 1 mg, 1 mg, SubCUTAneous, PRN  dextrose 10 % infusion, , IntraVENous, Continuous PRN  insulin lispro (HUMALOG,ADMELOG) injection vial 0-8 Units, 0-8 Units, SubCUTAneous, TID WC  insulin lispro (HUMALOG,ADMELOG) injection vial 0-4 Units, 0-4 Units, SubCUTAneous, Nightly       ASSESSMENT and PLAN:     Principal Problem:    Syncope and collapse  Resolved Problems:    * No resolved hospital problems. *       89 y.o.   male with PMHx HTN, HLD, afib, DM was brought in via EMS after he was found unconscious outside. It was unclear now long he had been down.     Mechanical fall at home

## 2024-06-27 NOTE — CONSULTS
New Urology Consult Note    Patient: Ruperto Pantoja MRN: [id number]  SSN: [id number]    YOB: 1924  Age: 89 y.o.  Sex: male            Plan:     AUR, constipation   -Continue with andres in place. If he remains IP next week, mobility increases, constipation resolves -> consider VT. If he fails VT OR if he is discharged prior to void trial, he is to call [facility] office to arrange OP FU to include VT.   -Continue flomax- monitor closely for hypotension, orthostatics, dizziness, unsteady gait/fall risk. Stop if any concerns.   -bowel regimen per primary     Thank you for this consult. Please contact [facility] with any further questions/concerns.    History of Present Illness:     Chief Complaint:  right leg pain     Ruperto Pantoja is seen in consultation for reasons noted above at the request of Willis Paula MD. Admitted to hospital for Syncope and collapse    This is a 89 y.o. male with a history of DM, afib, HTN, HLD, prostate cancer s/p brachytherapy is admitted to hospital after being found unconscious outside due to unclear etiology, ? Dehydration.     Urology consult today for AUR. Appears he was straight cathed for 450ml yesterday followed by andres placement.     AFVSS  WBC 9.4  Cr 1.13  UA w/o evidence of infection     On exam, he reports difficulty urinating prior to andres catheter placement. He attributes this to constipation, lack of BM in nearly one week. Aside from this, primary complaint is right leg pain. Andres is secured to statlock and draining clear alexand[place] urine. Denies abd/flank pain.     AUR   Onset yesterday   Location bladder  Duration continuous   Alleviating factors andres catheter    Subjective     Past Medical History  Past Medical History:   Diagnosis Date    Diabetes (HCC)     Hypercholesterolemia     Hypertension        Past Surgical History:   No past surgical history on file.    Medication:  Current Facility-Administered Medications  Voicendo    CT CSpine W/O Contrast    Result Date: 6/22/2024  INDICATION:  found down, febrile STUDY:  CT CERVICAL SPINE WO CONTRAST Examination: Cervical spine CT. No contrast or comparisons. Thin section axial images were obtained with sagittal and coronal reformats. CT dose reduction was achieved through use of a standardized protocol tailored for this examination and automatic exposure control for dose modulation. FINDINGS: Alignment of the cervical spine is normal. There is no bony destructive lesion or evidence of acute fracture. Small left pleural effusion. There are vascular calcifications. Multilevel degenerative change.     1. No acute bony abnormality 2. Left pleural effusion Electronically signed by [employer]      XR SHOULDER LEFT (MIN 2 VIEWS)   Final Result      Acute distal clavicle fracture.      Electronically signed by [employer]      CT CHEST ABDOMEN PELVIS W CONTRAST Additional Contrast? None   Final Result         1. Small left pleural effusion otherwise no acute abnormality in the chest      2. 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 No acute bony abnormality      2. Left pleural effusion         Electronically signed by [employer]      CT Head W/O Contrast   Final Result         No acute abnormality      Electronically signed by [employer]            Signed By: Kenyatta Arana, CED - NP  - June 27, 2024

## 2024-06-27 NOTE — PLAN OF CARE
Problem: Safety - Adult  Goal: Free from fall injury  6/27/2024 1438 by Gaye Rosales RN  Outcome: Adequate for Discharge  6/27/2024 1437 by Gaye Rosales RN  Outcome: Adequate for Discharge  6/27/2024 1006 by Gaye Rosales RN  Outcome: Progressing     Problem: Discharge Planning  Goal: Discharge to home or other facility with appropriate resources  6/27/2024 1438 by Gaye Rosales RN  Outcome: Adequate for Discharge  6/27/2024 1437 by Gaye Rosales RN  Outcome: Adequate for Discharge  6/27/2024 1006 by Gaye Rosales RN  Outcome: Progressing     Problem: Chronic Conditions and Co-morbidities  Goal: Patient's chronic conditions and co-morbidity symptoms are monitored and maintained or improved  6/27/2024 1438 by Gaye Rosales RN  Outcome: Adequate for Discharge  6/27/2024 1437 by Gaye Rosales RN  Outcome: Adequate for Discharge  6/27/2024 1006 by Gaye Rosales RN  Outcome: Progressing     Problem: Pain  Goal: Verbalizes/displays adequate comfort level or baseline comfort level  6/27/2024 1438 by Gaye Rosales RN  Outcome: Adequate for Discharge  6/27/2024 1437 by Gaey Rosales RN  Outcome: Adequate for Discharge  6/27/2024 1006 by Gaye Rosales RN  Outcome: Progressing     Problem: Skin/Tissue Integrity  Goal: Absence of new skin breakdown  Description: 1.  Monitor for areas of redness and/or skin breakdown  2.  Assess vascular access sites hourly  3.  Every 4-6 hours minimum:  Change oxygen saturation probe site  4.  Every 4-6 hours:  If on nasal continuous positive airway pressure, respiratory therapy assess nares and determine need for appliance change or resting period.  6/27/2024 1438 by Gaye Rosales RN  Outcome: Adequate for Discharge  6/27/2024 1437 by Gaye Rosales RN  Outcome: Adequate for Discharge  6/27/2024 1006 by Gaye Rosales RN  Outcome: Progressing

## 2024-06-27 NOTE — PROGRESS NOTES
Pharmacy Dosing Services: 6/27/24    Pharmacist Renal Dosing  Note for Eliquis   Physician Dr. Paula    Indication: afib  Previous Regimen Eliquis 2.5mg BID   Serum Creatinine Lab Results   Component Value Date/Time    CREATININE 1.13 06/27/2024 04:51 AM      Creatinine Clearance Estimated Creatinine Clearance: 33 mL/min (based on SCr of 1.13 mg/dL).   BUN Lab Results   Component Value Date/Time    BUN 18 06/27/2024 04:51 AM         Plan: Adjust to eliquis 5mg BID for improved Scr    Thank You,  Enriqueta Marie, PharmD, BCPS

## 2024-06-27 NOTE — DISCHARGE INSTRUCTIONS
HOSPITALIST DISCHARGE INSTRUCTIONS      NAME: Ruperto Pantoja   :  1924   MRN:  [id number]     Date/Time:  2024 2:34 PM    ADMIT DATE: 2024     DISCHARGE DATE: 2024         ADMISSION DIAGNOSIS: Syncope and collapse [R55]      DISCHARGE DIAGNOSIS:  Hypotension, rule out sepsis  Dehydration  Constipation  Urinary retention, s/p andres    DIET:  ADULT DIET; Regular; 4 carb choices (60 gm/meal)     ACTIVITY:  as tolerated with assistance    OTHER INSTRUCTIONS:    Continue PT/OT.    Attempt voiding trial in 1-2 days as mobility improves.  Was likely due to immobility and constipation.  Refer to [facility] if recurrent retention.    If you experience any of the following symptoms then please call your primary care physician or return to the emergency room if you cannot get hold of your doctor:  Fever, chills, nausea, vomiting, diarrhea, change in mentation, falling, bleeding, shortness of breath      MEDICATIONS:  It is important that you take the medication exactly as they are prescribed.   Keep your medication in the bottles provided by the pharmacist and keep a list of the medication names, dosages, and times to be taken in your wallet.   Do not take other medications without consulting your doctor.     FOLLOW UP:  Please call and set up an appointment to see them in 1 week.      Brooks Goldman, APRN - NP  [id number] [facility] [address] [phone]    Schedule an appointment as soon as possible for a visit            Information obtained by :  I understand that if any problems occur once I am at home I am to contact my physician.  I understand and acknowledge receipt of the instructions indicated above.                                                                                                                                               Physician's or R.N.'s Signature                                                                  Date/Time

## 2024-06-27 NOTE — DISCHARGE SUMMARY
Hospitalist Discharge Summary     Patient ID:  Ruperto Pantoja  [id number]  89 y.o.  9/22/1924    Admit date: 6/22/2024    Discharge date and time: 6/27/2024    Admission Diagnoses: Syncope and collapse [R55]      Discharge Diagnoses:      Principal Problem:    Syncope and collapse  Resolved Problems:    * No resolved hospital problems. *       ***        Hospital Course:     Ruperto Pantoja  is a 89 y.o.   male with PMHx HTN, HLD, afib, DM was brought in via EMS after he was found unconscious outside. It was unclear now long he had been down.     Mechanical fall at home   Dehydration   SIRS   Hypotension, fever documented upto 103 °F   Marginally elevated flat trend of HS Troponin in a 89 yo with above finding leads us to no particular etiology.   IVF to continue   Monitor CBC, BMP daily   Empiric IV Abx as ordered - we will de-escalate abx soon   Appreciate cardiology teams assistance.   Resume diet   Lowered dose of beta blocker with hold parameters      Recurrent urinary retention  -Straight cath.  May need Pa.  Start Flomax.  Consult urology     T2DM -  Resume diet   SSI   Resume Lantus as was PTA      Hyperlipidemia -chronic   Continue statin with pharmacy substitution      Afib-chronic   Rate controlled - continue beta blocker, if BP allows   Continue full anticoagulation.      HTN -chronic   Holding HCTZ   Beta blocker as above      Traumatic wounds -  Standard nursing care      Debility -reportedly worsened since GB surgery   PT OT to see him.   I anticipate he will return home with possibly home health and family support            DVT ppx: Eliquis    PCP: Brooks Goldman, CED - NP     Consults: cardiology, urology    Condition of patient at discharge: improved     Discharge Exam:     BP (!) 131/57   Pulse 64   Temp 98.5 °F (36.9 °C) (Oral)   Resp 18   Ht 1.702 m (5' 7\")   Wt 77.1 kg (170 lb)   SpO2 100%   BMI 26.63 kg/m²      General:   No acute distress, resting comfortably in

## 2024-06-27 NOTE — CARE COORDINATION
Transition of Care Plan:    RUR: 15% Moderate  Prior Level of Functioning: Independent from home alone  Disposition: [facility]  If SNF or IPR: Date FOC offered: 6/25/24  Date FOC received: 6/25/24  Accepting facility: [facility]  Follow up appointments: PCP, Specialist  DME needed: NA  Transportation at discharge: [facility], 1500, [phone]  IM/IMM Medicare/ letter given: 6/22/24; 6/27/24  Emergency Contact: Daughter Meera Wolfe, [phone]  Discharge Caregiver contacted prior to discharge? Yes  Care Conference needed? NA  Barriers to discharge: NA    CM confirmed [facility] can accept patient today. Accepting MD is Dr. Perez. Patient has been assigned to room 2144. Nurse to call report to [phone].       Kenyatta Choi, MS  [phone]

## 2024-07-05 ENCOUNTER — HOSPITAL ENCOUNTER (INPATIENT)
Facility: HOSPITAL | Age: 89
LOS: 7 days | Discharge: HOSPICE | End: 2024-07-13
Attending: EMERGENCY MEDICINE | Admitting: INTERNAL MEDICINE
Payer: MEDICARE

## 2024-07-05 DIAGNOSIS — Z87.898 HISTORY OF FEVER: Primary | ICD-10-CM

## 2024-07-05 DIAGNOSIS — N39.0 URINARY TRACT INFECTION ASSOCIATED WITH INDWELLING URETHRAL CATHETER, INITIAL ENCOUNTER (HCC): ICD-10-CM

## 2024-07-05 DIAGNOSIS — R60.9 SWELLING: ICD-10-CM

## 2024-07-05 DIAGNOSIS — T83.511A URINARY TRACT INFECTION ASSOCIATED WITH INDWELLING URETHRAL CATHETER, INITIAL ENCOUNTER (HCC): ICD-10-CM

## 2024-07-05 DIAGNOSIS — R31.0 GROSS HEMATURIA: ICD-10-CM

## 2024-07-05 PROCEDURE — 99285 EMERGENCY DEPT VISIT HI MDM: CPT

## 2024-07-05 ASSESSMENT — PAIN - FUNCTIONAL ASSESSMENT: PAIN_FUNCTIONAL_ASSESSMENT: 0-10

## 2024-07-05 ASSESSMENT — PAIN SCALES - GENERAL: PAINLEVEL_OUTOF10: 0

## 2024-07-06 PROBLEM — G93.41 ACUTE METABOLIC ENCEPHALOPATHY: Status: ACTIVE | Noted: 2024-07-06

## 2024-07-06 PROBLEM — N39.0 UTI (URINARY TRACT INFECTION): Status: ACTIVE | Noted: 2024-07-06

## 2024-07-06 LAB
ALBUMIN SERPL-MCNC: 1.9 G/DL (ref 3.5–5)
ALBUMIN/GLOB SERPL: 0.6 (ref 1.1–2.2)
ALP SERPL-CCNC: 94 U/L (ref 45–117)
ALT SERPL-CCNC: <6 U/L (ref 12–78)
ANION GAP SERPL CALC-SCNC: 6 MMOL/L (ref 5–15)
APPEARANCE UR: ABNORMAL
APPEARANCE UR: ABNORMAL
AST SERPL-CCNC: 20 U/L (ref 15–37)
BACTERIA URNS QL MICRO: ABNORMAL /HPF
BACTERIA URNS QL MICRO: NEGATIVE /HPF
BASOPHILS # BLD: 0 K/UL (ref 0–0.1)
BASOPHILS NFR BLD: 0 % (ref 0–1)
BILIRUB SERPL-MCNC: 0.7 MG/DL (ref 0.2–1)
BILIRUB UR QL CFM: NEGATIVE
BILIRUB UR QL: NEGATIVE
BUN SERPL-MCNC: 22 MG/DL (ref 6–20)
BUN/CREAT SERPL: 21 (ref 12–20)
CALCIUM SERPL-MCNC: 7.8 MG/DL (ref 8.5–10.1)
CHLORIDE SERPL-SCNC: 107 MMOL/L (ref 97–108)
CO2 SERPL-SCNC: 24 MMOL/L (ref 21–32)
COLOR UR: ABNORMAL
COLOR UR: ABNORMAL
COMMENT:: NORMAL
CREAT SERPL-MCNC: 1.04 MG/DL (ref 0.7–1.3)
DIFFERENTIAL METHOD BLD: ABNORMAL
EOSINOPHIL # BLD: 0.1 K/UL (ref 0–0.4)
EOSINOPHIL NFR BLD: 1 % (ref 0–7)
EPITH CASTS URNS QL MICRO: ABNORMAL /LPF
EPITH CASTS URNS QL MICRO: ABNORMAL /LPF
ERYTHROCYTE [DISTWIDTH] IN BLOOD BY AUTOMATED COUNT: 14.6 % (ref 11.5–14.5)
FLUAV RNA SPEC QL NAA+PROBE: NOT DETECTED
FLUBV RNA SPEC QL NAA+PROBE: NOT DETECTED
GLOBULIN SER CALC-MCNC: 3.4 G/DL (ref 2–4)
GLUCOSE BLD STRIP.AUTO-MCNC: 115 MG/DL (ref 65–117)
GLUCOSE BLD STRIP.AUTO-MCNC: 148 MG/DL (ref 65–117)
GLUCOSE BLD STRIP.AUTO-MCNC: 154 MG/DL (ref 65–117)
GLUCOSE BLD STRIP.AUTO-MCNC: 84 MG/DL (ref 65–117)
GLUCOSE BLD STRIP.AUTO-MCNC: 97 MG/DL (ref 65–117)
GLUCOSE SERPL-MCNC: 165 MG/DL (ref 65–100)
GLUCOSE UR STRIP.AUTO-MCNC: NEGATIVE MG/DL
GLUCOSE UR STRIP.AUTO-MCNC: NEGATIVE MG/DL
HCT VFR BLD AUTO: 33.3 % (ref 36.6–50.3)
HGB BLD-MCNC: 11.1 G/DL (ref 12.1–17)
HGB UR QL STRIP: ABNORMAL
HGB UR QL STRIP: ABNORMAL
HYALINE CASTS URNS QL MICRO: ABNORMAL /LPF (ref 0–2)
HYALINE CASTS URNS QL MICRO: ABNORMAL /LPF (ref 0–5)
IMM GRANULOCYTES # BLD AUTO: 0 K/UL
IMM GRANULOCYTES NFR BLD AUTO: 0 %
KETONES UR QL STRIP.AUTO: 40 MG/DL
KETONES UR QL STRIP.AUTO: NEGATIVE MG/DL
LACTATE BLD-SCNC: 3.2 MMOL/L (ref 0.4–2)
LEUKOCYTE ESTERASE UR QL STRIP.AUTO: ABNORMAL
LEUKOCYTE ESTERASE UR QL STRIP.AUTO: ABNORMAL
LYMPHOCYTES # BLD: 0.9 K/UL (ref 0.8–3.5)
LYMPHOCYTES NFR BLD: 6 % (ref 12–49)
MCH RBC QN AUTO: 29.6 PG (ref 26–34)
MCHC RBC AUTO-ENTMCNC: 33.3 G/DL (ref 30–36.5)
MCV RBC AUTO: 88.8 FL (ref 80–99)
MONOCYTES # BLD: 1.3 K/UL (ref 0–1)
MONOCYTES NFR BLD: 9 % (ref 5–13)
NEUTS BAND NFR BLD MANUAL: 1 % (ref 0–6)
NEUTS SEG # BLD: 12.1 K/UL (ref 1.8–8)
NEUTS SEG NFR BLD: 83 % (ref 32–75)
NITRITE UR QL STRIP.AUTO: NEGATIVE
NITRITE UR QL STRIP.AUTO: POSITIVE
NRBC # BLD: 0 K/UL (ref 0–0.01)
NRBC BLD-RTO: 0 PER 100 WBC
PH UR STRIP: 5 (ref 5–8)
PH UR STRIP: 5.5 (ref 5–8)
PLATELET # BLD AUTO: 325 K/UL (ref 150–400)
PMV BLD AUTO: 9.5 FL (ref 8.9–12.9)
POTASSIUM SERPL-SCNC: 3.5 MMOL/L (ref 3.5–5.1)
PROT SERPL-MCNC: 5.3 G/DL (ref 6.4–8.2)
PROT UR STRIP-MCNC: 30 MG/DL
PROT UR STRIP-MCNC: 30 MG/DL
RBC # BLD AUTO: 3.75 M/UL (ref 4.1–5.7)
RBC #/AREA URNS HPF: >100 /HPF (ref 0–5)
RBC #/AREA URNS HPF: >100 /HPF (ref 0–5)
RBC MORPH BLD: ABNORMAL
SARS-COV-2 RNA RESP QL NAA+PROBE: NOT DETECTED
SERVICE CMNT-IMP: ABNORMAL
SERVICE CMNT-IMP: ABNORMAL
SERVICE CMNT-IMP: NORMAL
SODIUM SERPL-SCNC: 137 MMOL/L (ref 136–145)
SP GR UR REFRACTOMETRY: 1.03 (ref 1–1.03)
SP GR UR REFRACTOMETRY: 1.03 (ref 1–1.03)
SPECIMEN HOLD: NORMAL
URINE CULTURE IF INDICATED: ABNORMAL
URINE CULTURE IF INDICATED: ABNORMAL
UROBILINOGEN UR QL STRIP.AUTO: 0.2 EU/DL (ref 0.2–1)
UROBILINOGEN UR QL STRIP.AUTO: 1 EU/DL (ref 0.2–1)
WBC # BLD AUTO: 14.4 K/UL (ref 4.1–11.1)
WBC URNS QL MICRO: ABNORMAL /HPF (ref 0–4)
WBC URNS QL MICRO: ABNORMAL /HPF (ref 0–4)

## 2024-07-06 PROCEDURE — 87086 URINE CULTURE/COLONY COUNT: CPT

## 2024-07-06 PROCEDURE — 82962 GLUCOSE BLOOD TEST: CPT

## 2024-07-06 PROCEDURE — 6360000002 HC RX W HCPCS: Performed by: INTERNAL MEDICINE

## 2024-07-06 PROCEDURE — 83605 ASSAY OF LACTIC ACID: CPT

## 2024-07-06 PROCEDURE — 81001 URINALYSIS AUTO W/SCOPE: CPT

## 2024-07-06 PROCEDURE — 6370000000 HC RX 637 (ALT 250 FOR IP): Performed by: INTERNAL MEDICINE

## 2024-07-06 PROCEDURE — 87040 BLOOD CULTURE FOR BACTERIA: CPT

## 2024-07-06 PROCEDURE — 36415 COLL VENOUS BLD VENIPUNCTURE: CPT

## 2024-07-06 PROCEDURE — 87075 CULTR BACTERIA EXCEPT BLOOD: CPT

## 2024-07-06 PROCEDURE — 2500000003 HC RX 250 WO HCPCS: Performed by: INTERNAL MEDICINE

## 2024-07-06 PROCEDURE — 1100000000 HC RM PRIVATE

## 2024-07-06 PROCEDURE — 2580000003 HC RX 258: Performed by: EMERGENCY MEDICINE

## 2024-07-06 PROCEDURE — 6360000002 HC RX W HCPCS: Performed by: EMERGENCY MEDICINE

## 2024-07-06 PROCEDURE — 80053 COMPREHEN METABOLIC PANEL: CPT

## 2024-07-06 PROCEDURE — 85025 COMPLETE CBC W/AUTO DIFF WBC: CPT

## 2024-07-06 PROCEDURE — 87636 SARSCOV2 & INF A&B AMP PRB: CPT

## 2024-07-06 PROCEDURE — 87205 SMEAR GRAM STAIN: CPT

## 2024-07-06 PROCEDURE — 2580000003 HC RX 258: Performed by: INTERNAL MEDICINE

## 2024-07-06 PROCEDURE — 87070 CULTURE OTHR SPECIMN AEROBIC: CPT

## 2024-07-06 RX ORDER — TAMSULOSIN HYDROCHLORIDE 0.4 MG/1
0.4 CAPSULE ORAL DAILY
Status: DISCONTINUED | OUTPATIENT
Start: 2024-07-06 | End: 2024-07-13 | Stop reason: HOSPADM

## 2024-07-06 RX ORDER — FAMOTIDINE 20 MG/1
20 TABLET, FILM COATED ORAL 2 TIMES DAILY
COMMUNITY

## 2024-07-06 RX ORDER — METOPROLOL SUCCINATE 25 MG/1
25 TABLET, EXTENDED RELEASE ORAL DAILY
Status: DISCONTINUED | OUTPATIENT
Start: 2024-07-06 | End: 2024-07-10

## 2024-07-06 RX ORDER — SODIUM CHLORIDE 9 MG/ML
INJECTION, SOLUTION INTRAVENOUS CONTINUOUS
Status: DISCONTINUED | OUTPATIENT
Start: 2024-07-06 | End: 2024-07-09

## 2024-07-06 RX ORDER — POLYETHYLENE GLYCOL 3350 17 G/17G
17 POWDER, FOR SOLUTION ORAL DAILY PRN
Status: DISCONTINUED | OUTPATIENT
Start: 2024-07-06 | End: 2024-07-10

## 2024-07-06 RX ORDER — ONDANSETRON 2 MG/ML
4 INJECTION INTRAMUSCULAR; INTRAVENOUS EVERY 6 HOURS PRN
Status: DISCONTINUED | OUTPATIENT
Start: 2024-07-06 | End: 2024-07-13 | Stop reason: HOSPADM

## 2024-07-06 RX ORDER — SENNA AND DOCUSATE SODIUM 50; 8.6 MG/1; MG/1
1 TABLET, FILM COATED ORAL 2 TIMES DAILY
Status: DISCONTINUED | OUTPATIENT
Start: 2024-07-06 | End: 2024-07-10

## 2024-07-06 RX ORDER — ACETAMINOPHEN 325 MG/1
650 TABLET ORAL EVERY 6 HOURS PRN
Status: DISCONTINUED | OUTPATIENT
Start: 2024-07-06 | End: 2024-07-13 | Stop reason: HOSPADM

## 2024-07-06 RX ORDER — SODIUM CHLORIDE 0.9 % (FLUSH) 0.9 %
5-40 SYRINGE (ML) INJECTION PRN
Status: DISCONTINUED | OUTPATIENT
Start: 2024-07-06 | End: 2024-07-10

## 2024-07-06 RX ORDER — INSULIN LISPRO 100 [IU]/ML
0-4 INJECTION, SOLUTION INTRAVENOUS; SUBCUTANEOUS NIGHTLY
Status: DISCONTINUED | OUTPATIENT
Start: 2024-07-06 | End: 2024-07-10

## 2024-07-06 RX ORDER — SODIUM CHLORIDE 0.9 % (FLUSH) 0.9 %
5-40 SYRINGE (ML) INJECTION EVERY 12 HOURS SCHEDULED
Status: DISCONTINUED | OUTPATIENT
Start: 2024-07-06 | End: 2024-07-10

## 2024-07-06 RX ORDER — TRAMADOL HYDROCHLORIDE 50 MG/1
50 TABLET ORAL EVERY 6 HOURS PRN
COMMUNITY

## 2024-07-06 RX ORDER — SODIUM CHLORIDE 9 MG/ML
INJECTION, SOLUTION INTRAVENOUS PRN
Status: DISCONTINUED | OUTPATIENT
Start: 2024-07-06 | End: 2024-07-13 | Stop reason: HOSPADM

## 2024-07-06 RX ORDER — INSULIN GLARGINE 100 [IU]/ML
10 INJECTION, SOLUTION SUBCUTANEOUS EVERY MORNING
Status: DISCONTINUED | OUTPATIENT
Start: 2024-07-06 | End: 2024-07-08

## 2024-07-06 RX ORDER — INSULIN LISPRO 100 [IU]/ML
0-8 INJECTION, SOLUTION INTRAVENOUS; SUBCUTANEOUS
Status: DISCONTINUED | OUTPATIENT
Start: 2024-07-06 | End: 2024-07-10

## 2024-07-06 RX ORDER — ACETAMINOPHEN 650 MG/1
650 SUPPOSITORY RECTAL EVERY 6 HOURS PRN
Status: DISCONTINUED | OUTPATIENT
Start: 2024-07-06 | End: 2024-07-13 | Stop reason: HOSPADM

## 2024-07-06 RX ORDER — METRONIDAZOLE 500 MG/1
500 TABLET ORAL 3 TIMES DAILY
COMMUNITY

## 2024-07-06 RX ADMIN — SODIUM CHLORIDE: 9 INJECTION, SOLUTION INTRAVENOUS at 15:46

## 2024-07-06 RX ADMIN — SENNOSIDES AND DOCUSATE SODIUM 1 TABLET: 50; 8.6 TABLET ORAL at 21:17

## 2024-07-06 RX ADMIN — SODIUM CHLORIDE: 9 INJECTION, SOLUTION INTRAVENOUS at 03:30

## 2024-07-06 RX ADMIN — CEFEPIME 2000 MG: 2 INJECTION, POWDER, FOR SOLUTION INTRAVENOUS at 15:47

## 2024-07-06 RX ADMIN — FAMOTIDINE 20 MG: 10 INJECTION, SOLUTION INTRAVENOUS at 08:22

## 2024-07-06 RX ADMIN — WATER 2000 MG: 1 INJECTION INTRAMUSCULAR; INTRAVENOUS; SUBCUTANEOUS at 03:08

## 2024-07-06 RX ADMIN — METOPROLOL SUCCINATE 25 MG: 25 TABLET, EXTENDED RELEASE ORAL at 08:21

## 2024-07-06 RX ADMIN — TAMSULOSIN HYDROCHLORIDE 0.4 MG: 0.4 CAPSULE ORAL at 08:21

## 2024-07-06 RX ADMIN — SODIUM CHLORIDE, PRESERVATIVE FREE 10 ML: 5 INJECTION INTRAVENOUS at 08:22

## 2024-07-06 RX ADMIN — VANCOMYCIN HYDROCHLORIDE 2250 MG: 10 INJECTION, POWDER, LYOPHILIZED, FOR SOLUTION INTRAVENOUS at 03:31

## 2024-07-06 RX ADMIN — SENNOSIDES AND DOCUSATE SODIUM 1 TABLET: 50; 8.6 TABLET ORAL at 08:21

## 2024-07-06 RX ADMIN — SENNOSIDES AND DOCUSATE SODIUM 1 TABLET: 50; 8.6 TABLET ORAL at 03:35

## 2024-07-06 RX ADMIN — INSULIN GLARGINE 10 UNITS: 100 INJECTION, SOLUTION SUBCUTANEOUS at 08:22

## 2024-07-06 NOTE — PROGRESS NOTES
Progress Note          Pt Name  Ruperto Pantoja   Date of Birth 9/22/1924   Medical Record Number  [id number]      Age  89 y.o.   PCP Brooks Goldman, APRN - NP   Admit date:  7/5/2024  9:38 PM     Room Number  520/01  @ [facility]   Date of Service  7/6/24       Admission Diagnoses:  Acute metabolic encephalopathy     Admission Summary:  \" The patient is a 89 yo hx of HTN, DM, afib, urine retention w/ andres, presented w/ AMS, complicated UTI, open wounds.  The patient is a poor historian.  He was admitted to [facility] 2 weeks ago for fall, dehydration, urine retention, and fevers.  He was sent to rehab with a andres.  Today, rehab facility sent to the patient to the ED for fevers and confusion, concerning for sepsis.  In the ED, patient was afebrile.  WBC 14.4.  U/A significant for a UTI.  Nursing discovered multiple open wounds, large area of bruising.  These were not present during last admission.  \"     Assessment and plan:       Sepsis -possibly due to   CAUTI  POA;   Hematuria reported noted in Andres cath; also, possibly contributed by new   Decubitus ulcers; additionally, we note   Right basilar airspace disease  (Noted on Xray chest 07/05/24) possibly due to   Aspiration PNA (?)  all of the above manifesting with signs of end organ damage including   Lactic Acidosis   Agitation / \"AMS\"     Cx results from blood and urine are pending   WBC was elevated at 14.4 k   Lactate was 3.2 7/6/24 3am     Admit to inpt status   Continue IV Cefepime + Vancomycin (per pharmacy protocol)   Supportive care for agitation/AMS - sitter as needed   Fall precautions   Aspiration precautions   SLP evaluation when possible   Gentle IVF   Holding Eliquis due to gross hematuria - which seems to have improved     Ecchymosis of Left arm / forearm  Ecchymosis of lower back   DTI of Sacral area   Stage II ulcer on the RIGHT anterior upper thigh   Ecchymosis of both upper anterior and posterior thighs   Wound care to  this patient and greater than 50% of that time was spent with patient (and/or family) coordinating patient’s clinical issues; this includes time spent during multidisciplinary rounds.        Shanthi Latif MD MPH FACP [facility] Phy-Adv  7/6/2024

## 2024-07-06 NOTE — H&P
MARY SNIDER [facility]  [address] [place] [address] [phone]        Hospitalist Admission History and Physical      NAME:  Ruperto Pantoja   :   1924   MRN:  [id number]     PCP:  Brooks Goldman APRN - NP     Date/Time of service:  2024  2:08 AM        Subjective:     CHIEF COMPLAINT: confusion     HISTORY OF PRESENT ILLNESS:     The patient is a 89 yo hx of HTN, DM, afib, urine retention w/ andres, presented w/ AMS, complicated UTI, open wounds.  The patient is a poor historian.  He was admitted to [facility] 2 weeks ago for fall, dehydration, urine retention, and fevers.  He was sent to rehab with a andres.  Today, rehab facility sent to the patient to the ED for fevers and confusion, concerning for sepsis.  In the ED, patient was afebrile.  WBC 14.4.  U/A significant for a UTI.  Nursing discovered multiple open wounds, large area of bruising.  These were not present during last admission.      Allergies   Allergen Reactions    Penicillins      Other reaction(s): Unknown (comments)       Prior to Admission medications    Medication Sig Start Date End Date Taking? Authorizing Provider   metoprolol succinate (TOPROL XL) 25 MG extended release tablet Take 1 tablet by mouth daily 24   Willis Paula MD   polyethylene glycol (GLYCOLAX) 17 g packet Take 1 packet by mouth daily as needed for Constipation 24  Willis Paula MD   gabapentin (NEURONTIN) 100 MG capsule TAKE 1 CAPSULE THREE TIMES A DAY 24  Willis Paula MD   tamsulosin (FLOMAX) 0.4 MG capsule Take 1 capsule by mouth daily 24   Willis Paula MD   apixaban (ELIQUIS) 5 MG TABS tablet Take 1 tablet by mouth 2 times daily ceived the following from [facility]: Outside name: Eliquis 5 mg tablet 24   Brooks Goldman APRN - NP   insulin glargine (LANTUS SOLOSTAR) 100 UNIT/ML injection pen INJECT 10 UNITS EVERY MORNING 24   Brooks Goldman  APRN - NP   simvastatin (ZOCOR) 20 MG tablet TAKE 1 TABLET DAILY IN THE EVENING 4/8/24   Brooks Goldman APRN - NP   SITagliptin (JANUVIA) 50 MG tablet Take 1 tablet by mouth 2 times daily 4/8/24   Brooks Goldman APRN - NP   SURE COMFORT PEN NEEDLES 32G X 4 MM MISC USE AS DIRECTED FOR TYPE 2 DIABETES MELLITUS WITH DIABETIC NEUROPATHY 11/20/23   Brooks Goldman APRN - NP   FREESTYLE LITE strip USE AS DIRECTED TWICE A DAY 5/30/23   Brooks Goldman APRN - NP       Past Medical History:   Diagnosis Date    A-fib (HCC)     Diabetes (HCC)     Hypercholesterolemia     Hypertension         No past surgical history on file.    Social History     Tobacco Use    Smoking status: Never    Smokeless tobacco: Never   Substance Use Topics    Alcohol use: Not Currently        Family History   Problem Relation Age of Onset    Hypertension Other     Diabetes Other         Review of Systems:  (bold if positive, if negative)    Gen:  Eyes:  ENT:  CVS:  Pulm:  GI:  :  MS:  Skin:  Psych:  Endo:  Hem:  Renal:  Neuro:          Objective:      VITALS:    Vital signs reviewed; most recent are:    Vitals:    07/05/24 2157   BP: (!) 149/72   Pulse: 85   Resp: 25   Temp: 98 °F (36.7 °C)   SpO2: 98%     SpO2 Readings from Last 6 Encounters:   07/05/24 98%   06/27/24 100%   04/08/24 99%        No intake or output data in the 24 hours ending 07/06/24 0208     Exam:     Physical Exam:    Gen:  elderly, frail, NAD  HEENT:  Pink conjunctivae, PERRL, hard of hearing, moist mucous membranes  Neck:  Supple, without masses, thyroid non-tender  Resp:  No accessory muscle use, clear breath sounds without wheezes rales or rhonchi  Card:  No murmurs, normal S1, S2 without thrills, bruits or peripheral edema  Abd:  Soft, non-tender, non-distended, normoactive bowel sounds are present, has chronic andres  Lymph:  No cervical adenopathy  Musc:  No cyanosis or clubbing  Skin:  significant, multiple open wounds.  Large bruising on L side  Neuro:  Cranial

## 2024-07-06 NOTE — ED NOTES
TRANSFER - OUT REPORT:    Verbal report given to JOSE MIGUEL Bojorquez on Ruperto Pantoja  being transferred to [facility] for routine progression of patient care       Report consisted of patient's Situation, Background, Assessment and   Recommendations(SBAR).     Information from the following report(s) Nurse Handoff Report, ED SBAR, MAR, and Recent Results was reviewed with the receiving nurse.    Justice Fall Assessment:                           Lines:   Peripheral IV 07/06/24 Distal;Right;Anterior Forearm (Active)        Opportunity for questions and clarification was provided.      Patient transported with:  Tech

## 2024-07-06 NOTE — PROGRESS NOTES
[facility] Dosing Services: Antimicrobial Stewardship Daily Doc  Consult for antibiotic dosing of Vancomycin/cefepime by Dr. Sharma  Indication: multiple wounds, diabetic patient  Day of Therapy: 1    Ht Readings from Last 1 Encounters:   07/05/24 1.702 m (5' 7\")        Wt Readings from Last 1 Encounters:   07/05/24 88.9 kg (196 lb)      Vancomycin therapy:  Loading dose: Vancomycin 2250 mg x1 dose now/given  Maintenance dose: Vancomycin 1000 mg IV every 24 hours   Dose calculated to approximate a           a. Target AUC/ENEIDA of 400-600          b. Trough of 15-20 mcg/mL   Last level:  mcg/mL  Plan:   Dose administration notes:     Non-Kinetic Antimicrobial Dosing Regimen:   Current Regimen:  Cefepime 2 grams IV q12H  Recommendation:   Dose administration notes:     Other Antimicrobial   (not dosed by pharmacist)    Cultures    Serum Creatinine Lab Results   Component Value Date/Time    CREATININE 1.04 07/06/2024 01:07 AM          Creatinine Clearance Estimated Creatinine Clearance: 41 mL/min (based on SCr of 1.04 mg/dL).     Temp Temp: 98 °F (36.7 °C) (Oral)       WBC Lab Results   Component Value Date/Time    WBC 14.4 07/06/2024 01:07 AM          Procalcitonin Lab Results   Component Value Date/Time    PROCAL 0.76 06/23/2024 01:27 AM      For Antifungals, Metronidazole and Nafcillin: Lab Results   Component Value Date/Time    ALT <6 07/06/2024 01:07 AM    AST 20 07/06/2024 01:07 AM        Pharmacist: Mike Wallace  [phone]

## 2024-07-06 NOTE — ED PROVIDER NOTES
[facility] EMERGENCY DEPT  EMERGENCY DEPARTMENT ENCOUNTER      Pt Name: Ruperto Pantoja  MRN: [id number]  Birthdate 9/22/1924  Date of evaluation: 7/5/2024  Provider: Carmine Rivers MD      HISTORY OF PRESENT ILLNESS      89-year-old male with history of diabetes, hyperlipidemia, hypertension, chronic Pa presenting to the ER for possible infection.  Patient presented from [facility] due to concern for infection.  Allegedly had a fever earlier today.  On arrival to the ER he is afebrile.  He is irate that he is in the emergency department and does not want any testing to be done.  He is uncooperative and upset with staff that he is not at his home.  He denies any acute medical complaints and is requesting to leave.              Nursing Notes were reviewed.    REVIEW OF SYSTEMS         Review of Systems   Constitutional:  Positive for fever.   Respiratory:  Negative for shortness of breath.    Gastrointestinal:  Negative for abdominal pain.   Genitourinary:  Positive for hematuria.        Chronic Pa   Psychiatric/Behavioral:  Positive for agitation.            PAST MEDICAL HISTORY     Past Medical History:   Diagnosis Date    Diabetes (HCC)     Hypercholesterolemia     Hypertension          SURGICAL HISTORY     No past surgical history on file.      CURRENT MEDICATIONS       Previous Medications    APIXABAN (ELIQUIS) 5 MG TABS TABLET    Take 1 tablet by mouth 2 times daily ceived the following from [facility]: Outside name: Eliquis 5 mg tablet    FREESTYLE LITE STRIP    USE AS DIRECTED TWICE A DAY    GABAPENTIN (NEURONTIN) 100 MG CAPSULE    TAKE 1 CAPSULE THREE TIMES A DAY    INSULIN GLARGINE (LANTUS SOLOSTAR) 100 UNIT/ML INJECTION PEN    INJECT 10 UNITS EVERY MORNING    METOPROLOL SUCCINATE (TOPROL XL) 25 MG EXTENDED RELEASE TABLET    Take 1 tablet by mouth daily    POLYETHYLENE GLYCOL (GLYCOLAX) 17 G PACKET    Take 1 packet by mouth daily as needed for Constipation    SIMVASTATIN  unpleasant demeanor   HENT:      Head: Normocephalic and atraumatic.      Mouth/Throat:      Mouth: Mucous membranes are moist.      Pharynx: Oropharynx is clear. No oropharyngeal exudate.   Eyes:      Extraocular Movements: Extraocular movements intact.      Pupils: Pupils are equal, round, and reactive to light.   Cardiovascular:      Rate and Rhythm: Normal rate and regular rhythm.   Pulmonary:      Effort: Pulmonary effort is normal.      Breath sounds: Normal breath sounds.   Abdominal:      General: Abdomen is flat.      Palpations: Abdomen is soft.      Tenderness: There is no abdominal tenderness.   Genitourinary:     Comments: Indwelling Pa catheter with gross hematuria  Musculoskeletal:         General: No deformity. Normal range of motion.      Cervical back: Normal range of motion.   Skin:     General: Skin is warm and dry.      Findings: No rash.   Neurological:      General: No focal deficit present.      Mental Status: He is alert and oriented to person, place, and time.      Cranial Nerves: No cranial nerve deficit.   Psychiatric:         Mood and Affect: Mood normal.             EMERGENCY DEPARTMENT COURSE and DIFFERENTIAL DIAGNOSIS/MDM:   Vitals:    Vitals:    07/05/24 2157   BP: (!) 149/72   Pulse: 85   Resp: 25   Temp: 98 °F (36.7 °C)   TempSrc: Oral   SpO2: 98%   Weight: 88.9 kg (196 lb)   Height: 1.702 m (5' 7\")         Medical Decision Making  Patient is determined not to have any interventions done here at the emergency department.  Overall he has enough capacity to refuse testing and treatment tonight.  His vital signs are stable.  He will be discharged without any interventions done per his request    Amount and/or Complexity of Data Reviewed  Labs: ordered.            REASSESSMENT          CONSULTS:  None    PROCEDURES:     Procedures        (Please note that portions of this note were completed with a voice recognition program.  Efforts were made to edit the dictations but occasionally  words are mis-transcribed.)    Carmine Rivers MD (electronically signed)  Emergency Attending Physician              Carmine Rivers MD  07/05/24 0414

## 2024-07-06 NOTE — ED TRIAGE NOTES
Pt coming from [facility] , staff called for fever and possible sepsis.   Pt denies any pain, fever or being sick.  Pt is very upset for being here , wants to go back home

## 2024-07-07 LAB
ANION GAP SERPL CALC-SCNC: 6 MMOL/L (ref 5–15)
BACTERIA SPEC CULT: NORMAL
BACTERIA SPEC CULT: NORMAL
BASOPHILS # BLD: 0.1 K/UL (ref 0–0.1)
BASOPHILS NFR BLD: 1 % (ref 0–1)
BUN SERPL-MCNC: 16 MG/DL (ref 6–20)
BUN/CREAT SERPL: 20 (ref 12–20)
CALCIUM SERPL-MCNC: 7.6 MG/DL (ref 8.5–10.1)
CHLORIDE SERPL-SCNC: 113 MMOL/L (ref 97–108)
CK SERPL-CCNC: 40 U/L (ref 39–308)
CO2 SERPL-SCNC: 21 MMOL/L (ref 21–32)
CREAT SERPL-MCNC: 0.81 MG/DL (ref 0.7–1.3)
CRP SERPL-MCNC: 6.63 MG/DL (ref 0–0.3)
DIFFERENTIAL METHOD BLD: ABNORMAL
EOSINOPHIL # BLD: 0.1 K/UL (ref 0–0.4)
EOSINOPHIL NFR BLD: 1 % (ref 0–7)
ERYTHROCYTE [DISTWIDTH] IN BLOOD BY AUTOMATED COUNT: 15.1 % (ref 11.5–14.5)
GLUCOSE BLD STRIP.AUTO-MCNC: 109 MG/DL (ref 65–117)
GLUCOSE BLD STRIP.AUTO-MCNC: 79 MG/DL (ref 65–117)
GLUCOSE BLD STRIP.AUTO-MCNC: 92 MG/DL (ref 65–117)
GLUCOSE BLD STRIP.AUTO-MCNC: 96 MG/DL (ref 65–117)
GLUCOSE SERPL-MCNC: 81 MG/DL (ref 65–100)
HCT VFR BLD AUTO: 30.9 % (ref 36.6–50.3)
HGB BLD-MCNC: 10.1 G/DL (ref 12.1–17)
IMM GRANULOCYTES # BLD AUTO: 0.3 K/UL (ref 0–0.04)
IMM GRANULOCYTES NFR BLD AUTO: 2 % (ref 0–0.5)
LYMPHOCYTES # BLD: 1.2 K/UL (ref 0.8–3.5)
LYMPHOCYTES NFR BLD: 8 % (ref 12–49)
MAGNESIUM SERPL-MCNC: 1.8 MG/DL (ref 1.6–2.4)
MCH RBC QN AUTO: 29.2 PG (ref 26–34)
MCHC RBC AUTO-ENTMCNC: 32.7 G/DL (ref 30–36.5)
MCV RBC AUTO: 89.3 FL (ref 80–99)
MONOCYTES # BLD: 1.2 K/UL (ref 0–1)
MONOCYTES NFR BLD: 8 % (ref 5–13)
NEUTS SEG # BLD: 11.6 K/UL (ref 1.8–8)
NEUTS SEG NFR BLD: 80 % (ref 32–75)
NRBC # BLD: 0 K/UL (ref 0–0.01)
NRBC BLD-RTO: 0 PER 100 WBC
PHOSPHATE SERPL-MCNC: 2.2 MG/DL (ref 2.6–4.7)
PLATELET # BLD AUTO: 303 K/UL (ref 150–400)
PMV BLD AUTO: 9.3 FL (ref 8.9–12.9)
POTASSIUM SERPL-SCNC: 3.5 MMOL/L (ref 3.5–5.1)
PROCALCITONIN SERPL-MCNC: 0.21 NG/ML
RBC # BLD AUTO: 3.46 M/UL (ref 4.1–5.7)
RBC MORPH BLD: ABNORMAL
SERVICE CMNT-IMP: NORMAL
SODIUM SERPL-SCNC: 140 MMOL/L (ref 136–145)
TSH SERPL DL<=0.05 MIU/L-ACNC: 6.97 UIU/ML (ref 0.36–3.74)
WBC # BLD AUTO: 14.5 K/UL (ref 4.1–11.1)

## 2024-07-07 PROCEDURE — 94761 N-INVAS EAR/PLS OXIMETRY MLT: CPT

## 2024-07-07 PROCEDURE — 83735 ASSAY OF MAGNESIUM: CPT

## 2024-07-07 PROCEDURE — 1100000000 HC RM PRIVATE

## 2024-07-07 PROCEDURE — 84100 ASSAY OF PHOSPHORUS: CPT

## 2024-07-07 PROCEDURE — 86140 C-REACTIVE PROTEIN: CPT

## 2024-07-07 PROCEDURE — 6360000002 HC RX W HCPCS: Performed by: INTERNAL MEDICINE

## 2024-07-07 PROCEDURE — 85025 COMPLETE CBC W/AUTO DIFF WBC: CPT

## 2024-07-07 PROCEDURE — 82962 GLUCOSE BLOOD TEST: CPT

## 2024-07-07 PROCEDURE — 82550 ASSAY OF CK (CPK): CPT

## 2024-07-07 PROCEDURE — 36415 COLL VENOUS BLD VENIPUNCTURE: CPT

## 2024-07-07 PROCEDURE — 84145 PROCALCITONIN (PCT): CPT

## 2024-07-07 PROCEDURE — 80048 BASIC METABOLIC PNL TOTAL CA: CPT

## 2024-07-07 PROCEDURE — 6370000000 HC RX 637 (ALT 250 FOR IP): Performed by: INTERNAL MEDICINE

## 2024-07-07 PROCEDURE — 84443 ASSAY THYROID STIM HORMONE: CPT

## 2024-07-07 PROCEDURE — 2580000003 HC RX 258: Performed by: INTERNAL MEDICINE

## 2024-07-07 RX ORDER — FAMOTIDINE 20 MG/1
20 TABLET, FILM COATED ORAL DAILY
Status: DISCONTINUED | OUTPATIENT
Start: 2024-07-07 | End: 2024-07-10

## 2024-07-07 RX ADMIN — CEFEPIME 2000 MG: 2 INJECTION, POWDER, FOR SOLUTION INTRAVENOUS at 03:17

## 2024-07-07 RX ADMIN — CEFEPIME 2000 MG: 2 INJECTION, POWDER, FOR SOLUTION INTRAVENOUS at 15:25

## 2024-07-07 RX ADMIN — SODIUM CHLORIDE: 9 INJECTION, SOLUTION INTRAVENOUS at 15:25

## 2024-07-07 RX ADMIN — SODIUM CHLORIDE, PRESERVATIVE FREE 10 ML: 5 INJECTION INTRAVENOUS at 08:27

## 2024-07-07 RX ADMIN — TAMSULOSIN HYDROCHLORIDE 0.4 MG: 0.4 CAPSULE ORAL at 08:26

## 2024-07-07 RX ADMIN — VANCOMYCIN HYDROCHLORIDE 1000 MG: 1 INJECTION, POWDER, LYOPHILIZED, FOR SOLUTION INTRAVENOUS at 03:35

## 2024-07-07 RX ADMIN — SENNOSIDES AND DOCUSATE SODIUM 1 TABLET: 50; 8.6 TABLET ORAL at 08:26

## 2024-07-07 RX ADMIN — METOPROLOL SUCCINATE 25 MG: 25 TABLET, EXTENDED RELEASE ORAL at 08:26

## 2024-07-07 NOTE — PROGRESS NOTES
Pharmacy Dosing Services: 7/7/24    The pharmacist has determined that this patient meets P & T approved criteria for conversion from IV to oral therapy for the following medication:Famotidine      The pharmacist has written the following order for the patient: Famotidine 20 mg PO Daily.  The pharmacist will continue to monitor the patient's status and advise the physician if conversion back to IV therapy is recommended.    Thank you,      Jaqueline Meeks, PharmD, BCPS

## 2024-07-07 NOTE — PROGRESS NOTES
Physical Therapy Note:  PT orders acknowledged and chart reviewed.  Pt received supine in bed with sitter present.  Pt adamantly refusing PT services stating, \"What a joke.  I just want to die.\"  Will offer next tx day, yet likely will complete orders based on pt wishes.  Marie Mckeon, PT

## 2024-07-07 NOTE — PROGRESS NOTES
Progress Note          Pt Name  Ruperto Pantoja   Date of Birth 9/22/1924   Medical Record Number  [id number]      Age  89 y.o.   PCP Brooks Goldman, APRN - NP   Admit date:  7/5/2024  9:38 PM     Room Number  520/01  @ [facility]   Date of Service  7/6/24       Admission Diagnoses:  Acute metabolic encephalopathy     Admission Summary:  \" The patient is a 89 yo hx of HTN, DM, afib, urine retention w/ andres, presented w/ AMS, complicated UTI, open wounds.  The patient is a poor historian.  He was admitted to [facility] 2 weeks ago for fall, dehydration, urine retention, and fevers.  He was sent to rehab with a andres.  Today, rehab facility sent to the patient to the ED for fevers and confusion, concerning for sepsis.  In the ED, patient was afebrile.  WBC 14.4.  U/A significant for a UTI.  Nursing discovered multiple open wounds, large area of bruising.  These were not present during last admission.  \"     Assessment and plan:       Sepsis -possibly due to   CAUTI  POA;   Hematuria reported noted in Andres cath; also, possibly contributed by new   Decubitus ulcers; additionally, we note   Right basilar airspace disease  (Noted on Xray chest 07/05/24) possibly due to   Aspiration PNA (?)  all of the above manifesting with signs of end organ damage including   Lactic Acidosis   Agitation / \"AMS\"     Cx results from blood NTD   Urine Cx negative   WBC was elevated at 14.4 k   Lactate was 3.2 7/6/24 3am     Due to high risk of this pt's PNA as aspiration/HCAP, we will continue Cefepime x 5 days and then de-escalate to PO Augmentin   Discontinue Vancomycin possibly tomorrow if the cultures are negative   Supportive care for agitation/AMS - sitter as needed   Fall precautions   Aspiration precautions   SLP evaluation when possible   Gentle IVF   Holding Eliquis due to gross hematuria - which seems to have improved     Ecchymosis of Left arm / forearm  Ecchymosis of lower back   DTI of Sacral area   Stage  distension      Extremities:  The overall wounds are stable clinically   RIGHT anterior thigh - upper are near inguinal area - stage II ulcer about 15 cm teodoro   Ecchymosis of the LEFT Arm+Forearm  Edema/ecchymosis of the lower back  DTI of the sacrum area without discernible open wound   Multiple fresh pressure relieving dressings on both anterior knees, shin    Skin:     Lymph nodes:  Not examined    Musculoskeletal Muscle bulk B/L symmetrical   Neuro Face appears symmetrical   Cranial nerves are intact,   motor movement b/l symmetrical,   Sensory evaluation b/l symmetrical    Psych:  Alert and oriented,   Appears somewhat confused, but he can be easily re-oriented           Medications reviewed     Current Facility-Administered Medications   Medication Dose Route Frequency    0.9 % sodium chloride infusion   IntraVENous Continuous    sodium chloride flush 0.9 % injection 5-40 mL  5-40 mL IntraVENous 2 times per day    sodium chloride flush 0.9 % injection 5-40 mL  5-40 mL IntraVENous PRN    0.9 % sodium chloride infusion   IntraVENous PRN    ondansetron (ZOFRAN) injection 4 mg  4 mg IntraVENous Q6H PRN    sennosides-docusate sodium (SENOKOT-S) 8.6-50 MG tablet 1 tablet  1 tablet Oral BID    polyethylene glycol (GLYCOLAX) packet 17 g  17 g Oral Daily PRN    famotidine (PEPCID) 20 mg in sodium chloride (PF) 0.9 % 10 mL injection  20 mg IntraVENous Daily    acetaminophen (TYLENOL) tablet 650 mg  650 mg Oral Q6H PRN    Or    acetaminophen (TYLENOL) suppository 650 mg  650 mg Rectal Q6H PRN    insulin lispro (HUMALOG,ADMELOG) injection vial 0-8 Units  0-8 Units SubCUTAneous TID WC    insulin lispro (HUMALOG,ADMELOG) injection vial 0-4 Units  0-4 Units SubCUTAneous Nightly    [Held by provider] apixaban (ELIQUIS) tablet 5 mg  5 mg Oral BID    insulin glargine (LANTUS) injection vial 10 Units  10 Units SubCUTAneous QAM    metoprolol succinate (TOPROL XL) extended release tablet 25 mg  25 mg Oral Daily    tamsulosin

## 2024-07-07 NOTE — PROGRESS NOTES
Occupational Therapy    Acknowledge OT order, completed chart review and nursing cleared for therapy.  Patient received resting in bed with sitter present.   Patient did not open eyes and requesting to be left alone.  \"I just want to die.\"  Discussed patient with nursing.  Anticipate limited participation with OT services however will follow up tomorrow as appropriate.     Patient with admission end of June with L clavicle fx and dc to [facility] and now readmitted for AMS.       Thank you,    Kirsten Hernandez, OT

## 2024-07-08 LAB
ANION GAP SERPL CALC-SCNC: 12 MMOL/L (ref 5–15)
BACTERIA SPEC CULT: ABNORMAL
BACTERIA SPEC CULT: ABNORMAL
BACTERIA SPEC CULT: NORMAL
BASOPHILS # BLD: 0 K/UL (ref 0–0.1)
BASOPHILS NFR BLD: 0 % (ref 0–1)
BUN SERPL-MCNC: 18 MG/DL (ref 6–20)
BUN/CREAT SERPL: 20 (ref 12–20)
CALCIUM SERPL-MCNC: 7.8 MG/DL (ref 8.5–10.1)
CHLORIDE SERPL-SCNC: 114 MMOL/L (ref 97–108)
CO2 SERPL-SCNC: 16 MMOL/L (ref 21–32)
CREAT SERPL-MCNC: 0.88 MG/DL (ref 0.7–1.3)
DIFFERENTIAL METHOD BLD: ABNORMAL
EOSINOPHIL # BLD: 0.1 K/UL (ref 0–0.4)
EOSINOPHIL NFR BLD: 1 % (ref 0–7)
ERYTHROCYTE [DISTWIDTH] IN BLOOD BY AUTOMATED COUNT: 15.5 % (ref 11.5–14.5)
GLUCOSE BLD STRIP.AUTO-MCNC: 131 MG/DL (ref 65–117)
GLUCOSE BLD STRIP.AUTO-MCNC: 152 MG/DL (ref 65–117)
GLUCOSE BLD STRIP.AUTO-MCNC: 161 MG/DL (ref 65–117)
GLUCOSE BLD STRIP.AUTO-MCNC: 184 MG/DL (ref 65–117)
GLUCOSE SERPL-MCNC: 96 MG/DL (ref 65–100)
GRAM STN SPEC: ABNORMAL
HCT VFR BLD AUTO: 31.9 % (ref 36.6–50.3)
HGB BLD-MCNC: 10.2 G/DL (ref 12.1–17)
IMM GRANULOCYTES # BLD AUTO: 0 K/UL
IMM GRANULOCYTES NFR BLD AUTO: 0 %
LYMPHOCYTES # BLD: 0.6 K/UL (ref 0.8–3.5)
LYMPHOCYTES NFR BLD: 4 % (ref 12–49)
MCH RBC QN AUTO: 29.1 PG (ref 26–34)
MCHC RBC AUTO-ENTMCNC: 32 G/DL (ref 30–36.5)
MCV RBC AUTO: 90.9 FL (ref 80–99)
METAMYELOCYTES NFR BLD MANUAL: 2 %
MONOCYTES # BLD: 0.9 K/UL (ref 0–1)
MONOCYTES NFR BLD: 6 % (ref 5–13)
NEUTS BAND NFR BLD MANUAL: 3 % (ref 0–6)
NEUTS SEG # BLD: 12.9 K/UL (ref 1.8–8)
NEUTS SEG NFR BLD: 84 % (ref 32–75)
NRBC # BLD: 0 K/UL (ref 0–0.01)
NRBC BLD-RTO: 0 PER 100 WBC
PLATELET # BLD AUTO: 328 K/UL (ref 150–400)
PMV BLD AUTO: 9.2 FL (ref 8.9–12.9)
POTASSIUM SERPL-SCNC: 3.8 MMOL/L (ref 3.5–5.1)
RBC # BLD AUTO: 3.51 M/UL (ref 4.1–5.7)
RBC MORPH BLD: ABNORMAL
SERVICE CMNT-IMP: ABNORMAL
SERVICE CMNT-IMP: NORMAL
SODIUM SERPL-SCNC: 142 MMOL/L (ref 136–145)
VANCOMYCIN SERPL-MCNC: 12 UG/ML
WBC # BLD AUTO: 14.8 K/UL (ref 4.1–11.1)

## 2024-07-08 PROCEDURE — 2580000003 HC RX 258: Performed by: INTERNAL MEDICINE

## 2024-07-08 PROCEDURE — 6370000000 HC RX 637 (ALT 250 FOR IP): Performed by: INTERNAL MEDICINE

## 2024-07-08 PROCEDURE — 36415 COLL VENOUS BLD VENIPUNCTURE: CPT

## 2024-07-08 PROCEDURE — 85025 COMPLETE CBC W/AUTO DIFF WBC: CPT

## 2024-07-08 PROCEDURE — 6360000002 HC RX W HCPCS: Performed by: INTERNAL MEDICINE

## 2024-07-08 PROCEDURE — 1100000000 HC RM PRIVATE

## 2024-07-08 PROCEDURE — 80048 BASIC METABOLIC PNL TOTAL CA: CPT

## 2024-07-08 PROCEDURE — 80202 ASSAY OF VANCOMYCIN: CPT

## 2024-07-08 PROCEDURE — 92610 EVALUATE SWALLOWING FUNCTION: CPT

## 2024-07-08 PROCEDURE — 82962 GLUCOSE BLOOD TEST: CPT

## 2024-07-08 PROCEDURE — 94761 N-INVAS EAR/PLS OXIMETRY MLT: CPT

## 2024-07-08 RX ORDER — DEXTROSE MONOHYDRATE 100 MG/ML
INJECTION, SOLUTION INTRAVENOUS CONTINUOUS PRN
Status: DISCONTINUED | OUTPATIENT
Start: 2024-07-08 | End: 2024-07-10

## 2024-07-08 RX ORDER — INSULIN GLARGINE 100 [IU]/ML
7 INJECTION, SOLUTION SUBCUTANEOUS EVERY MORNING
Status: DISCONTINUED | OUTPATIENT
Start: 2024-07-08 | End: 2024-07-10

## 2024-07-08 RX ADMIN — ACETAMINOPHEN 650 MG: 325 TABLET ORAL at 15:31

## 2024-07-08 RX ADMIN — INSULIN GLARGINE 7 UNITS: 100 INJECTION, SOLUTION SUBCUTANEOUS at 09:13

## 2024-07-08 RX ADMIN — VANCOMYCIN HYDROCHLORIDE 1000 MG: 1 INJECTION, POWDER, LYOPHILIZED, FOR SOLUTION INTRAVENOUS at 03:28

## 2024-07-08 RX ADMIN — CEFEPIME 2000 MG: 2 INJECTION, POWDER, FOR SOLUTION INTRAVENOUS at 03:26

## 2024-07-08 RX ADMIN — SENNOSIDES AND DOCUSATE SODIUM 1 TABLET: 50; 8.6 TABLET ORAL at 09:12

## 2024-07-08 RX ADMIN — SENNOSIDES AND DOCUSATE SODIUM 1 TABLET: 50; 8.6 TABLET ORAL at 21:52

## 2024-07-08 RX ADMIN — METOPROLOL SUCCINATE 25 MG: 25 TABLET, EXTENDED RELEASE ORAL at 09:12

## 2024-07-08 RX ADMIN — CEFEPIME 2000 MG: 2 INJECTION, POWDER, FOR SOLUTION INTRAVENOUS at 15:31

## 2024-07-08 RX ADMIN — FAMOTIDINE 20 MG: 20 TABLET, FILM COATED ORAL at 09:13

## 2024-07-08 RX ADMIN — TAMSULOSIN HYDROCHLORIDE 0.4 MG: 0.4 CAPSULE ORAL at 09:13

## 2024-07-08 ASSESSMENT — PAIN SCALES - GENERAL
PAINLEVEL_OUTOF10: 4
PAINLEVEL_OUTOF10: 0
PAINLEVEL_OUTOF10: 3

## 2024-07-08 ASSESSMENT — PAIN DESCRIPTION - LOCATION: LOCATION: HEAD

## 2024-07-08 ASSESSMENT — PAIN - FUNCTIONAL ASSESSMENT: PAIN_FUNCTIONAL_ASSESSMENT: ACTIVITIES ARE NOT PREVENTED

## 2024-07-08 NOTE — CARE COORDINATION
7/8/2024   CARE MANAGEMENT NOTE:  CM reviewed EMR and handoff was received from weekend  (Britta).  READMISSION:  Pt was admitted to  from 6/22 - 6/27 with multiple open wounds, complicated UTI, debility.  Pt discharged to [facility].  He has readmitted to  from 7/5 with gross hematuria.  Reportedly, pt had been residing alone.    RUR 20%    Transition Plan of Care:  ST eval complete; PT/OT evals pending.    Suggest Palliative to meet with pt/family re: [facility]  Outpatient follow   Mode of transport to be determined pending dispo    CM continue to follow pt for definitive discharge plan.  Edvin

## 2024-07-08 NOTE — PROGRESS NOTES
Physician Progress Note      PATIENT:               BRAD THOMAS  [facility] #:                  [id number]  :                       1924  ADMIT DATE:       2024 9:38 PM  DISCH DATE:  RESPONDING  PROVIDER #:        Sweta Stoll DO          QUERY TEXT:    Good afternoon.    Patient admitted with altered mental status. Documentation reflects sepsis in    and  IM Progress notes.    If possible, please document in the progress notes and discharge summary if   sepsis was:    The medical record reflects the following:    Risk Factors: 89 yr old male, HTN, DM, afib, urine retention w/ andres,   debility    Clinical Indicators: 24 03:02 POC Lactic Acid: 3.20; 24 01:07 WBC:   14.4, 24 03:09 WBC: 14.5, 24 01:59 WBC: 14.8;  Wound   culture: Heavy Mixed Gram Negative Rods;  Urine culture: No growth   (<1,000 CFU/ML);  Urine culture: No growth (<1,000 CFU/ML);    Anaerobic culture: no anaerobes isolated;  Blood culture: No growth to   date; 24 03:09 Procalcitonin: 0.21; 24 03:09 CRP: 6.63;  VS   21:01 temp 98.0, HR 85, /72, RR 25, RA SpO2 98%; from  and  IM   PN: \"Sepsis -possibly due to CAUTI  POA\"    Treatment: labs, blood cultures, wound cultures, urine culture, VS per unit   protocol, IV Cefepime, IV Vancomycin      Thank you,  Trinity Pederson RN, CDI  Options provided:  -- Sepsis confirmed after study  -- Sepsis ruled out after study  -- Other - I will add my own diagnosis  -- Disagree - Not applicable / Not valid  -- Disagree - Clinically unable to determine / Unknown  -- Refer to Clinical Documentation Reviewer    PROVIDER RESPONSE TEXT:    Sepsis ruled out after study.    Query created by: Trinity Pederson on 2024 3:39 PM      Electronically signed by:  Sweta Stoll DO 2024 3:46 PM

## 2024-07-08 NOTE — PROGRESS NOTES
Physical Therapy Note:'  Pt currently receiving spiritual care support at bedside.  He refused PT eval yesterday and OT eval today.  Pt stating, \"I just want to die last attempt.\" Will continue to follow next tx day, yet will honor pt wishes if declines and completer order.  Marie Mckeon, PT

## 2024-07-08 NOTE — PROGRESS NOTES
Spiritual Care Assessment/Progress Note  [facility]    Name: Ruperto Pantoja MRN: [id number]    Age: 89 y.o.     Sex: male   Language: English     Date: 2024            Total Time Calculated: 70 min              Spiritual Assessment begun in [facility] B5 MULTI-SPECIALTY ONCOLOGY 2  Service Provided For: Patient  Referral/Consult From: Palliative Care  Encounter Overview/Reason: Initial Encounter    Spiritual beliefs:      [x] Involved in a stone tradition/spiritual practice:      [] Supported by a stone community:      [] Claims no spiritual orientation:      [] Seeking spiritual identity:           [] Adheres to an individual form of spirituality:      [] Not able to assess:                Identified resources for coping and support system:   Support System: Children       [] Prayer                  [] Devotional reading               [] Music                  [] Guided Imagery     [] Pet visits                                        [] Other: (COMMENT)     Specific area/focus of visit   Encounter:    Crisis:    Spiritual/Emotional needs: Type: Spiritual Support, Emotional Distress  Ritual, Rites and Sacraments:    Grief, Loss, and Adjustments:    Ethics/Mediation:    Behavioral Health:    Palliative Care:    Advance Care Planning:      Plan/Referrals: Other (Comment) (Please contact [facility] for further consults.)    Narrative:   visit for the patient on Med Surg. Reviewed pt's chart and spoke with pt's nurse. Introduced self and chaplaincy. Pt is hard of hearing. Pt was tearful as he shared he missivette cole wife. Pt shared they met while he was in the . Pt shared her accomplishments, especially receiving an advanced degree. 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 Pt tearful as he offered his wife  from smoking, and he blames himself. Pt expressed shame. Pt shared his wish for God to take him.

## 2024-07-08 NOTE — PROGRESS NOTES
Speech LAnguage Pathology EVALUATION/DISCHARGE    Patient: Ruperto Pantoja (89 y.o. male)  Date: 7/8/2024  Primary Diagnosis: Gross hematuria [R31.0]  History of fever [Z87.898]  Acute metabolic encephalopathy [G93.41]  Urinary tract infection associated with indwelling urethral catheter, initial encounter ([facility]) [T83.511A, N39.0]       Precautions:                     ASSESSMENT :  Patient can swallow pills and water WNL. He is declining foods.    Continue to offer regular diet for comfort.   He may have aspiration risks post prandial and post prandial at age 89, especially with his R lean, but he is declining additional workup.   Admitted 7-5-24 with metabolic encephalopathy.   Cxr:   3. Lungs: Bibasilar underaeration. Patchy airspace disease right infrahilar distribution. Query potential aspiration or pneumonia of other etiologies? Clinical correlation. Subsegmental atelectatic changes left midlung zone. Mild pulmonary edema. Right apical bullous emphysema.   PMH;  DM, XOL, HTN,  complicated UTI, chr andres,  open wounds, memory issues.     Patient will be discharged from skilled speech-language pathology services at this time.     PLAN :  Recommendations and Planned Interventions:  Diet: Regular and thin liquids  Upright as much as possible during and after PO  Goals of care need to be addressed.         Acute SLP Services: No, patient will be discharged from acute skilled speech-language pathology at this time.    Discharge Recommendations: No, additional SLP treatment not indicated at discharge     SUBJECTIVE:   Patient stated, “My legs hurt, my skin hurts, I did it to myself by drinking all those years. I'm so embarrassed. I don't want my family to see me this way.”    OBJECTIVE:     Past Medical History:   Diagnosis Date    A-fib ([facility])     Diabetes (HCC)     Hypercholesterolemia     Hypertension    No past surgical history on file.  Prior Level of Function/Home Situation:        Baseline Assessment:  Current

## 2024-07-08 NOTE — PROGRESS NOTES
MARY SNIDER [facility]  [address] [place] [address] [phone]      Medical Progress Note      NAME: Ruperto Pantoja   :  1924  MRM:  [id number]    Date/Time of service: 2024         Subjective:     Chief Complaint:  Patient was personally seen and examined by me during this time period.  Chart reviewed. Fu ams. Does not cooperate with ROS; states he wants to die          Objective:       Vitals:       Last 24hrs VS reviewed since prior progress note. Most recent are:    Vitals:    24 1128   BP: 119/70   Pulse: 78   Resp: 18   Temp: 97.9 °F (36.6 °C)   SpO2: 98%     SpO2 Readings from Last 6 Encounters:   24 98%   24 100%   24 99%          Intake/Output Summary (Last 24 hours) at 2024 1350  Last data filed at 2024 0824  Gross per 24 hour   Intake 1200 ml   Output 500 ml   Net 700 ml        Exam:     Physical Exam:    Gen:  chronically ill appearing   HEENT:  Pink conjunctivae, PERRL, hard of hearing   Resp:  No accessory muscle use, clear breath sounds without wheezes rales or rhonchi  Card:  RRR, No murmurs, normal S1, S2  Abd:  Soft, non-tender, non-distended, normoactive bowel sounds are present  Musc:  No cyanosis or clubbing  Skin:  wounds on sacrum, bl Les, abd (please see wound care notes)  Neuro:  Cranial nerves 3-12 are grossly intact  Psych:  fair insight      Medications Reviewed: (see below)    Lab Data Reviewed: (see below)    ______________________________________________________________________    Medications:     Current Facility-Administered Medications   Medication Dose Route Frequency    insulin glargine (LANTUS) injection vial 7 Units  7 Units SubCUTAneous QAM    glucose chewable tablet 16 g  4 tablet Oral PRN    dextrose bolus 10% 125 mL  125 mL IntraVENous PRN    Or    dextrose bolus 10% 250 mL  250 mL IntraVENous PRN    glucagon injection 1 mg  1 mg SubCUTAneous PRN    dextrose 10 % infusion   IntraVENous  Continuous PRN    ceFEPIme (MAXIPIME) 2,000 mg in sodium chloride 0.9 % 100 mL IVPB (mini-bag)  2,000 mg IntraVENous Q12H    collagenase ointment   Topical Daily    famotidine (PEPCID) tablet 20 mg  20 mg Oral Daily    0.9 % sodium chloride infusion   IntraVENous Continuous    sodium chloride flush 0.9 % injection 5-40 mL  5-40 mL IntraVENous 2 times per day    sodium chloride flush 0.9 % injection 5-40 mL  5-40 mL IntraVENous PRN    0.9 % sodium chloride infusion   IntraVENous PRN    ondansetron (ZOFRAN) injection 4 mg  4 mg IntraVENous Q6H PRN    sennosides-docusate sodium (SENOKOT-S) 8.6-50 MG tablet 1 tablet  1 tablet Oral BID    polyethylene glycol (GLYCOLAX) packet 17 g  17 g Oral Daily PRN    acetaminophen (TYLENOL) tablet 650 mg  650 mg Oral Q6H PRN    Or    acetaminophen (TYLENOL) suppository 650 mg  650 mg Rectal Q6H PRN    insulin lispro (HUMALOG,ADMELOG) injection vial 0-8 Units  0-8 Units SubCUTAneous TID WC    insulin lispro (HUMALOG,ADMELOG) injection vial 0-4 Units  0-4 Units SubCUTAneous Nightly    [Held by provider] apixaban (ELIQUIS) tablet 5 mg  5 mg Oral BID    metoprolol succinate (TOPROL XL) extended release tablet 25 mg  25 mg Oral Daily    tamsulosin (FLOMAX) capsule 0.4 mg  0.4 mg Oral Daily    vancomycin (VANCOCIN) 1,000 mg in sodium chloride 0.9 % 250 mL IVPB ([id number])  1,000 mg IntraVENous Q24H          Lab Review:     Recent Labs     07/06/24 0107 07/07/24  0309 07/08/24  0159   WBC 14.4* 14.5* 14.8*   HGB 11.1* 10.1* 10.2*   HCT 33.3* 30.9* 31.9*    303 328     Recent Labs     07/06/24 0107 07/07/24  0309 07/08/24  0159    140 142   K 3.5 3.5 3.8    113* 114*   CO2 24 21 16*   BUN 22* 16 18   MG  --  1.8  --    PHOS  --  2.2*  --    ALT <6*  --   --      No results found for: \"GLUCPOC\"       Assessment / Plan:     89 yo hx of HTN, DM, afib, urine retention w/ andres, presented w/ AMS, complicated UTI, multiple wounds     Multiple open wounds/L side ecchymosis POA:

## 2024-07-08 NOTE — PROGRESS NOTES
Occupational Therapy Note  7/8/2024    OT eval order received and acknowledged and attempted at 0936 however patient politely declining at this time. Per chart review, pt and family awaiting palliative consult.     Thank you,  Jennifer Hopkins OTR/L

## 2024-07-08 NOTE — WOUND CARE
Wound Care Note:   New consult for \"multiple open wounds\"   Seen in 520/01    89 y.o. y/o male admitted on 7/5/2024   Admitted for Gross hematuria [R31.0]  History of fever [Z87.898]  Acute metabolic encephalopathy [G93.41]  Urinary tract infection associated with indwelling urethral catheter, initial encounter ([facility]) [T83.511A, N39.0]     History of a-fib, DM, Hypercholesterolemia, HTN   WBC = 14.8  No indication for wound culture  Diet: ADULT DIET; Easy to Chew; 4 carb choices (60 gm/meal)           Assessment:   Patient is alert, cooperative and reports no pain with movement .  Requires 2 assist with repositioning. Wedges and heel boots in place at end of visit.     Incontinent, has a Pa.  Incontinence care was given during visit for small soft BM.  Surface: Jayleen bed with CHRISTINA mattress    Bilateral heels intact and without erythema.  Heels offloaded with heel boots at end of visit.    Buttocks and sacrum intact without erythema; sacral foam removed due to moisture.    1. POA Right thigh   Moist, yellow slough, brown nonviable tissue, small amount of purulent drainage on dressings removed, malodorous, with surrounding blanchable erythema.  Tx: Soaked for 10 minutes with Vashe, applied silvasorb gel and covered with Mepilex transfer.       2. POA Left thigh  Moist, red, yellow slough, brown nonviable tissue, small amount of purulent drainage on dressings removed, malodorous, with surrounding blanchable erythema.  Tx: Soaked for 10 minutes with Vashe, applied silvasorb gel and covered with Mepilex transfer.      3. POA Abdomen  Scattered areas of moist, red, yellow slough, brown nonviable tissue, small amount of purulent drainage on dressings removed, malodorous, with surrounding blanchable erythema.  Tx: Soaked for 10 minutes with Vashe, applied silvasorb gel and covered with Mepilex transfer.      4. POA Coccyx  Dry, non-blanchable redness, no open area   Tx: Sacral foam removed due to risk for skin breakdown,

## 2024-07-08 NOTE — PROGRESS NOTES
Vancomycin level was 12.0 mcg/ml. This predicts an AUC of 466 and is within goal. Will continue same dosing.

## 2024-07-09 ENCOUNTER — APPOINTMENT (OUTPATIENT)
Dept: VASCULAR SURGERY | Facility: HOSPITAL | Age: 89
End: 2024-07-09
Attending: INTERNAL MEDICINE
Payer: MEDICARE

## 2024-07-09 LAB
ANION GAP SERPL CALC-SCNC: 3 MMOL/L (ref 5–15)
BASOPHILS # BLD: 0 K/UL (ref 0–0.1)
BASOPHILS NFR BLD: 0 % (ref 0–1)
BUN SERPL-MCNC: 22 MG/DL (ref 6–20)
BUN/CREAT SERPL: 19 (ref 12–20)
CALCIUM SERPL-MCNC: 7.6 MG/DL (ref 8.5–10.1)
CHLORIDE SERPL-SCNC: 113 MMOL/L (ref 97–108)
CO2 SERPL-SCNC: 22 MMOL/L (ref 21–32)
CREAT SERPL-MCNC: 1.15 MG/DL (ref 0.7–1.3)
DIFFERENTIAL METHOD BLD: ABNORMAL
EOSINOPHIL # BLD: 0.1 K/UL (ref 0–0.4)
EOSINOPHIL NFR BLD: 1 % (ref 0–7)
ERYTHROCYTE [DISTWIDTH] IN BLOOD BY AUTOMATED COUNT: 15.5 % (ref 11.5–14.5)
GLUCOSE BLD STRIP.AUTO-MCNC: 147 MG/DL (ref 65–117)
GLUCOSE BLD STRIP.AUTO-MCNC: 155 MG/DL (ref 65–117)
GLUCOSE BLD STRIP.AUTO-MCNC: 183 MG/DL (ref 65–117)
GLUCOSE BLD STRIP.AUTO-MCNC: 199 MG/DL (ref 65–117)
GLUCOSE SERPL-MCNC: 141 MG/DL (ref 65–100)
HCT VFR BLD AUTO: 30.5 % (ref 36.6–50.3)
HGB BLD-MCNC: 9.7 G/DL (ref 12.1–17)
IMM GRANULOCYTES # BLD AUTO: 0 K/UL
IMM GRANULOCYTES NFR BLD AUTO: 0 %
LYMPHOCYTES # BLD: 1.4 K/UL (ref 0.8–3.5)
LYMPHOCYTES NFR BLD: 11 % (ref 12–49)
MCH RBC QN AUTO: 29.1 PG (ref 26–34)
MCHC RBC AUTO-ENTMCNC: 31.8 G/DL (ref 30–36.5)
MCV RBC AUTO: 91.6 FL (ref 80–99)
METAMYELOCYTES NFR BLD MANUAL: 1 %
MONOCYTES # BLD: 0.9 K/UL (ref 0–1)
MONOCYTES NFR BLD: 7 % (ref 5–13)
MYELOCYTES NFR BLD MANUAL: 2 %
NEUTS BAND NFR BLD MANUAL: 1 % (ref 0–6)
NEUTS SEG # BLD: 10.2 K/UL (ref 1.8–8)
NEUTS SEG NFR BLD: 77 % (ref 32–75)
NRBC # BLD: 0 K/UL (ref 0–0.01)
NRBC BLD-RTO: 0 PER 100 WBC
PLATELET # BLD AUTO: 345 K/UL (ref 150–400)
PMV BLD AUTO: 9.6 FL (ref 8.9–12.9)
POTASSIUM SERPL-SCNC: ABNORMAL MMOL/L (ref 3.5–5.1)
RBC # BLD AUTO: 3.33 M/UL (ref 4.1–5.7)
RBC MORPH BLD: ABNORMAL
SERVICE CMNT-IMP: ABNORMAL
SODIUM SERPL-SCNC: 138 MMOL/L (ref 136–145)
WBC # BLD AUTO: 13.1 K/UL (ref 4.1–11.1)

## 2024-07-09 PROCEDURE — 80048 BASIC METABOLIC PNL TOTAL CA: CPT

## 2024-07-09 PROCEDURE — 6360000002 HC RX W HCPCS: Performed by: INTERNAL MEDICINE

## 2024-07-09 PROCEDURE — 97530 THERAPEUTIC ACTIVITIES: CPT

## 2024-07-09 PROCEDURE — 85025 COMPLETE CBC W/AUTO DIFF WBC: CPT

## 2024-07-09 PROCEDURE — 6370000000 HC RX 637 (ALT 250 FOR IP): Performed by: INTERNAL MEDICINE

## 2024-07-09 PROCEDURE — 93971 EXTREMITY STUDY: CPT

## 2024-07-09 PROCEDURE — 97163 PT EVAL HIGH COMPLEX 45 MIN: CPT

## 2024-07-09 PROCEDURE — 97165 OT EVAL LOW COMPLEX 30 MIN: CPT

## 2024-07-09 PROCEDURE — 94761 N-INVAS EAR/PLS OXIMETRY MLT: CPT

## 2024-07-09 PROCEDURE — 2580000003 HC RX 258: Performed by: INTERNAL MEDICINE

## 2024-07-09 PROCEDURE — 1100000000 HC RM PRIVATE

## 2024-07-09 PROCEDURE — 36415 COLL VENOUS BLD VENIPUNCTURE: CPT

## 2024-07-09 RX ORDER — OXYCODONE HYDROCHLORIDE 5 MG/1
2.5 TABLET ORAL EVERY 4 HOURS PRN
Status: DISCONTINUED | OUTPATIENT
Start: 2024-07-09 | End: 2024-07-13 | Stop reason: HOSPADM

## 2024-07-09 RX ADMIN — OXYCODONE 2.5 MG: 5 TABLET ORAL at 10:28

## 2024-07-09 RX ADMIN — APIXABAN 5 MG: 5 TABLET, FILM COATED ORAL at 20:33

## 2024-07-09 RX ADMIN — COLLAGENASE SANTYL: 250 OINTMENT TOPICAL at 10:17

## 2024-07-09 RX ADMIN — SENNOSIDES AND DOCUSATE SODIUM 1 TABLET: 50; 8.6 TABLET ORAL at 20:33

## 2024-07-09 RX ADMIN — CEFEPIME 2000 MG: 2 INJECTION, POWDER, FOR SOLUTION INTRAVENOUS at 15:54

## 2024-07-09 RX ADMIN — VANCOMYCIN HYDROCHLORIDE 1000 MG: 1 INJECTION, POWDER, LYOPHILIZED, FOR SOLUTION INTRAVENOUS at 03:55

## 2024-07-09 RX ADMIN — TAMSULOSIN HYDROCHLORIDE 0.4 MG: 0.4 CAPSULE ORAL at 10:12

## 2024-07-09 RX ADMIN — FAMOTIDINE 20 MG: 20 TABLET, FILM COATED ORAL at 10:12

## 2024-07-09 RX ADMIN — CEFEPIME 2000 MG: 2 INJECTION, POWDER, FOR SOLUTION INTRAVENOUS at 03:28

## 2024-07-09 RX ADMIN — SENNOSIDES AND DOCUSATE SODIUM 1 TABLET: 50; 8.6 TABLET ORAL at 10:12

## 2024-07-09 RX ADMIN — SODIUM CHLORIDE, PRESERVATIVE FREE 10 ML: 5 INJECTION INTRAVENOUS at 20:33

## 2024-07-09 RX ADMIN — SODIUM CHLORIDE, PRESERVATIVE FREE 10 ML: 5 INJECTION INTRAVENOUS at 10:15

## 2024-07-09 ASSESSMENT — PAIN SCALES - GENERAL
PAINLEVEL_OUTOF10: 5
PAINLEVEL_OUTOF10: 0

## 2024-07-09 ASSESSMENT — PAIN DESCRIPTION - DESCRIPTORS: DESCRIPTORS: ACHING

## 2024-07-09 ASSESSMENT — PAIN DESCRIPTION - ORIENTATION: ORIENTATION: LEFT;RIGHT

## 2024-07-09 ASSESSMENT — PAIN DESCRIPTION - LOCATION: LOCATION: LEG

## 2024-07-09 NOTE — PROGRESS NOTES
Spoke with Dr. Stein regarding the MRSA screen ordered, she stated MRSA screen was to be obtained from the [facility]. No further orders at this time.    Farheen Moncada RN

## 2024-07-09 NOTE — PLAN OF CARE
Problem: Physical Therapy - Adult  Goal: By Discharge: Performs mobility at highest level of function for planned discharge setting.  See evaluation for individualized goals.  Description: FUNCTIONAL STATUS PRIOR TO ADMISSION: Pt stating that he lives alone.  May have intermittent care taker.  Dtr  unable to obtain level of mobility PTA.    HOME SUPPORT PRIOR TO ADMISSION: The patient lived alone with intermittent caretaker to provide assistance.  Dtr locally or out of town?    Physical Therapy Goals  Initiated 7/9/2024  1.  Patient will move from supine to sit and sit to supine in bed with mod to maximal assistance x2 within 7 day(s).    2.  Patient will perform sit to stand with moderate assistance x2 within 7 day(s).  3.  Patient will transfer from bed to chair and chair to bed with moderate assistance x2 using the least restrictive device within 7 day(s).  4.  Patient will ambulate with moderate assistance x2 for 3x2 feet with the least restrictive device within 7 day(s).   Outcome: Progressing   PHYSICAL THERAPY EVALUATION    Patient: Ruperto Pantoja (89 y.o. male)  Date: 7/9/2024  Primary Diagnosis: Gross hematuria [R31.0]  History of fever [Z87.898]  Acute metabolic encephalopathy [G93.41]  Urinary tract infection associated with indwelling urethral catheter, initial encounter ([facility]) [T83.511A, N39.0]       Precautions: Restrictions/Precautions: Fall Risk (open wounds abdomen, groin, knees)                      ASSESSMENT :   DEFICITS/IMPAIRMENTS:   The patient is limited by decreased functional mobility, independence in ADLs, strength, body mechanics, activity tolerance, endurance, cognition, coordination, balance, increased pain levels. Pt admitted with UTI/gross hematuria with indwelling catheter, acute enceph and multiple weeping wounds on abdomen, groin and knee's/ BLE.  Pt initially refusing PT eval for past 2 days, yet agreeable today within reason.  Pt requesting to move his limbs without therapist

## 2024-07-09 NOTE — CARE COORDINATION
7/9/2024   CARE MANAGEMENT NOTE:  CM reviewed EMR for clinical updates. READMISSION:  Pt was admitted to  from 6/22 - 6/27 with multiple open wounds, complicated UTI, debility.  Pt discharged to [facility].  He has readmitted to  from 7/5 with gross hematuria.  Reportedly, pt had been residing alone.     RUR 19%     Transition Plan of Care:  Pt continues to decline PT/OT  Suggest Palliative to meet with pt/family re: [facility]  Outpatient follow   Mode of transport to be determined pending dispo     CM continue to follow pt for definitive discharge plan.  Edvin

## 2024-07-09 NOTE — PROGRESS NOTES
1740: RRT called on patient d/t a decline in patients orientation status and patient being uncooperative.    Patient will be transferring from ICU 7 to 2400. Report given to EVELINE Bran. Patient states he has all belongings at this time.

## 2024-07-09 NOTE — PROGRESS NOTES
Rapid Called at 1732    Responded to RRT at 1734 for Altered mental status    Provider at bedside: YES  Interventions ordered: Other (Comment)  Sepsis Suspected: NA  Transfer to Higher Level of Care: no      Vitals:    07/09/24 1730   BP: (!) 119/54   Pulse: 89   Resp:    Temp: 98.6 °F (37 °C)   SpO2:         Per primary RN, pt not following commands.    MD at bedside. Pt following commands for RRT.     MD denies need for interventions.     Rapid Ended at 1745  RRT RN assisted with transport to accepting unit NA.    Jennei Vazquez RN

## 2024-07-09 NOTE — PROGRESS NOTES
[facility] Dosing Services: Antimicrobial Stewardship Daily Doc  Consult for antibiotic dosing of Vancomycin/cefepime by Dr. Sharma  Indication: multiple wounds, diabetic patient  Day of Therapy: 4    Ht Readings from Last 1 Encounters:   07/05/24 1.702 m (5' 7\")        Wt Readings from Last 1 Encounters:   07/08/24 90.3 kg (199 lb)      Vancomycin therapy:  Loading dose: Vancomycin 2250 mg x1 dose now/given  Maintenance dose: Vancomycin 1000 mg IV every 24 hours   Dose calculated to approximate a           a. Target AUC/ENEIDA of 400-600          b. Trough of 15-20 mcg/mL   Last Level: 7/8 @ 0159: Vancomycin level was 12.0 mcg/mL  Scr bumped slightly to 1.15 (0.88 yesterday), WBC 13.1 (down from 14.8; afebrile; MRSA screen ordered but not done so will order again  Plan: Given slight bump in Scr, will adjust to vancomycin 750 mg IV Q24H. This predicts AUCss 472.     Non-Kinetic Antimicrobial Dosing Regimen:   Current Regimen:  Cefepime 2 grams IV q12H  Recommendation: Continue current    Other Antimicrobial   (not dosed by pharmacist) None   Cultures 7/9 MRSA nares - ordered  7/6 urine NG FINAL  7/6 anaerobic IP  7/6 Blood x 2 NGTD prelim  7/6 wound -  heavy mixed GNR   Serum Creatinine Lab Results   Component Value Date/Time    CREATININE 1.15 07/09/2024 03:57 AM      Creatinine Clearance Estimated Creatinine Clearance: 38 mL/min (based on SCr of 1.15 mg/dL).     Temp Temp: 97.7 °F (36.5 °C) (Oral)       WBC Lab Results   Component Value Date/Time    WBC 13.1 07/09/2024 03:57 AM      Procalcitonin Lab Results   Component Value Date/Time    PROCAL 0.21 07/07/2024 03:09 AM      For Antifungals, Metronidazole and Nafcillin: Lab Results   Component Value Date/Time    ALT <6 07/06/2024 01:07 AM    AST 20 07/06/2024 01:07 AM        Thank you,  Gonzalo Mera, Pharm D, BCPS  [phone]

## 2024-07-09 NOTE — PROGRESS NOTES
Music Therapy Assessment  [facility]    Ruperto Pantoja [id number]     9/22/1924  89 y.o.  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    Hypertensive disorder 12/08/2020    Type II diabetes mellitus ([facility]) 12/08/2020        Date: 7/9/2024            Total Time Calculated: 75 min          [facility] B5 MULTI-SPECIALTY ONCOLOGY 2    Mental Status:   [x] Alert [  ] Forgetful [  ]  Confused  [  ] Minimally responsive  [  ] Sleeping    Communication Status: [  ] Impaired Speech [  ] Nonverbal -N/A    Physical Status:   [  ] Oxygen in use  [x] Hard of Hearing [  ] Vision Impaired  [  ] Ambulatory  [  ] Ambulatory with assistance [  ] Non-ambulatory     Music Preferences, Background: Pt didn't provide this information.    Clinical Problem addressed: Increased social engagement and support    Goal(s) met in session:  Physical/Pain management (Scale of 1-10):    Pre-session rating: N/A  Post-session rating: N/A  [  ] Increased relaxation   [  ] Affected breathing patterns  [  ] Decreased muscle tension   [  ] Decreased agitation  [  ] Affected heart rate    [  ] Increased alertness     Emotional/Psychological:  [x] Increased self-expression   [  ] Decreased aggressive behavior   [  ] Decreased feelings of stress  [  ] Discussed healthy coping skills     [  ] Improved mood    [x] Decreased withdrawn behavior     Social:  [  ] Decreased feelings of isolation/loneliness [x] Positive social interaction   [  ] Provided support and/or comfort for family/friends    Spiritual:  [x] Spiritual support    [  ] Expressed  peace  [x] Expressed stone    [  ] Discussed beliefs    Techniques Utilized (Check all that apply):   [  ] Procedural support MT [  ] Music for relaxation [  ] Patient preferred music  [  ] Zabrina analysis  [  ] Song choice  [  ] Music for validation  [  ] Entrainment  [  ] Movement to music [  ] Guided visualization  [  ] ISO Principle  [  ] Patient instrument playing [  ] Song writing  [  ] Sing along   [  ] Improvisation  [  ] Sensory stimulation  [x] Active Listening  [  ] Music for spiritual support [  ] Making of CDs as gifts    Session Observations:  Referred by Chaplain Edwin; Patient (pt) was alert, lying in bed with his daughter, son-in-law, and RN present. While introducing self/role, pt presented with a low affect and expressed feelings of hopelessness (I.e. \"it doesn't even matter\", \"I should just shut up\", \"I don't even matter\", etc.). Despite pt's daughter expressing the pt's potential disinterest in music therapy, MT remained in the space after his loved ones exited for additional social interaction and emotional support. Pt initially appeared averse to this MT. 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 Pt also mentioned writing/publishing a story about an event in his childhood. He had 20 printed copies and spoke of giving some copies to his grandchildren and great-grandchildren, so they could know what he was like as a child. He acknowledged feelings of regret, while also sharing how blessed he's been throughout his life. He also presented with both moments of tearfulness and smiles as he engaged with this MT. Throughout the pt's sharing MT offered active listening, as well as words of encouragement and support in moments when the pt expressed feeling like his story/sharing didn't matter. Pt eventually concluded his sharing and seemed to shut down as evidenced by (AEB) no longer

## 2024-07-09 NOTE — PLAN OF CARE
Problem: Occupational Therapy - Adult  Goal: By Discharge: Performs self-care activities at highest level of function for planned discharge setting.  See evaluation for individualized goals.  Description: FUNCTIONAL STATUS PRIOR TO ADMISSION:  Per chart review, pt with recent admission at [facility] after which pt discharged to [facility] for rehab (daughter and cleaning lady were assisting patient as able). Prior to recent admission at [facility], pt was ambulatory with RW in home and SPC outside of home).     HOME SUPPORT: Per chart review, pt lived alone with daughter and cleaning lady to provide assistance.     Occupational Therapy Goals:  Initiated 7/9/2024  1.  Patient will perform grooming with Stand by Assist within 7 day(s).  2.  Patient will perform upper body dressing with Moderate Assist within 7 day(s).  3.  Patient will participate in upper extremity therapeutic exercise/activities with Stand by Assist for 10 minutes within 7 day(s).    4.  Patient will be Modified Independent for self feeding within 7 day(s).     7/9/2024 1552 by Jennifer Hopkins OT  Outcome: Progressing    OCCUPATIONAL THERAPY EVALUATION    Patient: Ruperto Pantoja (89 y.o. male)  Date: 7/9/2024  Primary Diagnosis: Gross hematuria [R31.0]  History of fever [Z87.898]  Acute metabolic encephalopathy [G93.41]  Urinary tract infection associated with indwelling urethral catheter, initial encounter ([facility]) [T83.511A, N39.0]         Precautions:                    ASSESSMENT :  Patient received semi supine in bed agreeable for OT/PT eval/tx. Per chart review, pt lives alone and was primarily MI for self care and functional transfers/mobility with RW in home and a crutch in community with daughter and cleaning lady assisting as able however pt with recent admission at [facility] after which pt discharged to [facility] for rehab.     Patient presents with decreased activity tolerance, generalized weakness and increased need for assist with self care (total A LB  functional  PROM: Generally decreased, functional    Strength:  Strength: Generally decreased, functional    Functional Mobility and Transfers for ADLs:    Bed Mobility:     Bed Mobility Training  Bed Mobility Training: Yes  Rolling: Total assistance;Assist X2 (rolling to right for repositioning in bed)    ADL Assessment:     Grooming: Stand by assistance  Grooming Skilled Clinical Factors: simulated    LE Dressing: Dependent/Total  LE Dressing Skilled Clinical Factors: simulated    Toileting: Dependent/Total  Toileting Skilled Clinical Factors: simulated    ADL Intervention and task modifications:      [facility] \"6 Clicks\"                                                       Daily Activity Inpatient Short Form  How much help from another person does the patient currently need... Total; A Lot A Little None   1.  Putting on and taking off regular lower body clothing? [x]  1 []  2 []  3 []  4   2.  Bathing (including washing, rinsing, drying)? [x]  1 []  2 []  3 []  4   3.  Toileting, which includes using toilet, bedpan or urinal? [x] 1 []  2 []  3 []  4   4.  Putting on and taking off regular upper body clothing? []  1 [x]  2 []  3 []  4   5.  Taking care of personal grooming such as brushing teeth? []  1 []  2 [x]  3 []  4   6.  Eating meals? []  1 []  2 [x]  3 []  4   © 2007, Trustees of [facility], under license to [employer]. All rights reserved     Score: 11/24     Interpretation of Tool:  Represents clinically-significant functional categories (i.e. Activities of daily living).    Cutoff score 39.4 (19) correlates to a good likelihood of discharging home versus a facility  Yuly Owen, Octavia Martinez, Rajat Recinos, Jannet Lane, Gm Robertson, Reggie Owen, -PAC “6-Clicks” Functional Assessment Scores Predict Acute Care Hospital Discharge Destination, Physical Therapy, Volume 94, Issue 9, 1 September 2014, Pages 4924-2073,  [email or web address]    Pain Rating:  No pain at rest, pt stating increased pain with activity   Pain Intervention(s):   nursing notified    Activity Tolerance:   Fair     After treatment:   Patient left in no apparent distress in bed, Call bell within reach, and Bed/ chair alarm activated    COMMUNICATION/EDUCATION:   The patient's plan of care was discussed with: physical therapist and registered nurse    Patient Education  Education Given To: Patient  Education Provided: Role of Therapy;Plan of Care;Precautions;Home Exercise Program;Fall Prevention Strategies;Transfer Training  Education Method: Demonstration;Verbal  Barriers to Learning: Cognition;Hearing  Education Outcome: Verbalized understanding;Demonstrated understanding;Continued education needed    Thank you for this referral.  Jennifer Hopkins OT  Minutes: 23    Occupational Therapy Evaluation Charge Determination   History Examination Decision-Making   LOW Complexity : Brief history review  HIGH Complexity: 5 Performance deficits relating to physical, cognitive, or psychosocial skills that result in activity limitations and/or participation restrictions  HIGH Complexity: Patient presents with comorbidities that affect occupational performance.  Significant modifications of tasks or assistance (eg. physical or verbal) with assessment (s) is necessary to enable pt to complete evaluation   Based on the above components, the patient evaluation is determined to be of the following complexity level: Low

## 2024-07-09 NOTE — PLAN OF CARE
Problem: Safety - Adult  Goal: Free from fall injury  Outcome: Progressing     Problem: Pain  Goal: Verbalizes/displays adequate comfort level or baseline comfort level  Outcome: Progressing     Problem: Skin/Tissue Integrity  Goal: Absence of new skin breakdown  Description: 1.  Monitor for areas of redness and/or skin breakdown  2.  Assess vascular access sites hourly  3.  Every 4-6 hours minimum:  Change oxygen saturation probe site  4.  Every 4-6 hours:  If on nasal continuous positive airway pressure, respiratory therapy assess nares and determine need for appliance change or resting period.  Outcome: Progressing     Problem: ABCDS Injury Assessment  Goal: Absence of physical injury  Outcome: Progressing     Problem: Skin/Tissue Integrity - Adult  Goal: Skin integrity remains intact  Outcome: Progressing     Problem: Chronic Conditions and Co-morbidities  Goal: Patient's chronic conditions and co-morbidity symptoms are monitored and maintained or improved  Outcome: Progressing

## 2024-07-10 LAB
BACTERIA SPEC CULT: NORMAL
BACTERIA SPEC CULT: NORMAL
GLUCOSE BLD STRIP.AUTO-MCNC: 119 MG/DL (ref 65–117)
GLUCOSE BLD STRIP.AUTO-MCNC: 148 MG/DL (ref 65–117)
GLUCOSE BLD STRIP.AUTO-MCNC: 161 MG/DL (ref 65–117)
GLUCOSE BLD STRIP.AUTO-MCNC: 175 MG/DL (ref 65–117)
SERVICE CMNT-IMP: ABNORMAL
SERVICE CMNT-IMP: NORMAL

## 2024-07-10 PROCEDURE — 6370000000 HC RX 637 (ALT 250 FOR IP): Performed by: INTERNAL MEDICINE

## 2024-07-10 PROCEDURE — 6360000002 HC RX W HCPCS: Performed by: INTERNAL MEDICINE

## 2024-07-10 PROCEDURE — 82962 GLUCOSE BLOOD TEST: CPT

## 2024-07-10 PROCEDURE — 2580000003 HC RX 258: Performed by: INTERNAL MEDICINE

## 2024-07-10 PROCEDURE — 94761 N-INVAS EAR/PLS OXIMETRY MLT: CPT

## 2024-07-10 PROCEDURE — 1100000000 HC RM PRIVATE

## 2024-07-10 RX ORDER — SCOLOPAMINE TRANSDERMAL SYSTEM 1 MG/1
1 PATCH, EXTENDED RELEASE TRANSDERMAL
Status: DISCONTINUED | OUTPATIENT
Start: 2024-07-10 | End: 2024-07-13

## 2024-07-10 RX ORDER — DIAZEPAM 5 MG/ML
5 INJECTION, SOLUTION INTRAMUSCULAR; INTRAVENOUS EVERY 4 HOURS PRN
Status: DISCONTINUED | OUTPATIENT
Start: 2024-07-10 | End: 2024-07-13 | Stop reason: HOSPADM

## 2024-07-10 RX ORDER — MORPHINE SULFATE 20 MG/ML
10 SOLUTION ORAL
Status: DISCONTINUED | OUTPATIENT
Start: 2024-07-10 | End: 2024-07-13

## 2024-07-10 RX ORDER — HYDROMORPHONE HYDROCHLORIDE 1 MG/ML
1 INJECTION, SOLUTION INTRAMUSCULAR; INTRAVENOUS; SUBCUTANEOUS
Status: DISCONTINUED | OUTPATIENT
Start: 2024-07-10 | End: 2024-07-13 | Stop reason: HOSPADM

## 2024-07-10 RX ORDER — LORAZEPAM 2 MG/ML
1 CONCENTRATE ORAL
Status: DISCONTINUED | OUTPATIENT
Start: 2024-07-10 | End: 2024-07-13

## 2024-07-10 RX ADMIN — DIAZEPAM 5 MG: 5 INJECTION, SOLUTION INTRAMUSCULAR; INTRAVENOUS at 16:44

## 2024-07-10 RX ADMIN — INSULIN GLARGINE 7 UNITS: 100 INJECTION, SOLUTION SUBCUTANEOUS at 09:29

## 2024-07-10 RX ADMIN — VANCOMYCIN HYDROCHLORIDE 750 MG: 750 INJECTION, POWDER, LYOPHILIZED, FOR SOLUTION INTRAVENOUS at 03:41

## 2024-07-10 RX ADMIN — CEFEPIME 2000 MG: 2 INJECTION, POWDER, FOR SOLUTION INTRAVENOUS at 02:41

## 2024-07-10 RX ADMIN — HYDROMORPHONE HYDROCHLORIDE 0.5 MG: 1 INJECTION, SOLUTION INTRAMUSCULAR; INTRAVENOUS; SUBCUTANEOUS at 16:42

## 2024-07-10 RX ADMIN — SODIUM CHLORIDE, PRESERVATIVE FREE 10 ML: 5 INJECTION INTRAVENOUS at 09:50

## 2024-07-10 ASSESSMENT — PAIN SCALES - GENERAL
PAINLEVEL_OUTOF10: 0

## 2024-07-10 NOTE — PROGRESS NOTES
[facility]  Good Help to Those in Need  [phone]     Patient Name: Ruperto Pantoja  YOB: 1924  Age: 89 y.o.    [facility] RN Note:  Hospice liaison spoke with daughter Meera by phone.  Patient multiple wounds and sepsis with confusion. Meera shared with me that he is saying he is ready to die. He is at risk of decline as IV antibiotics are discontinued  and he is being made comfort care today. Meera would like to meet with hospice liaison  tomorrow 10:30-11 am and is hoping for [facility]IP hospice admission at . Does not want to move him to [facility].      Thank you for the opportunity to be of service to this patient.     Raissa Cardenas RN  Clinical Nurse Liaison  [facility]  [phone] Mobile  [phone] Office   Available on [employer]      09-Jun-2024 20:55

## 2024-07-10 NOTE — PROGRESS NOTES
Physical Therapy Note:  Consulted CM regarding hospice care/disposition stating consulting family at this time. Pt observed supine in bed with eyes closed speaking to God and stating, \"I am so sorry\".  Will await hospice clarification next treatment day with completion of PT order likely.  Marie Mckeon, PT

## 2024-07-10 NOTE — PROGRESS NOTES
[facility]  Good Help to Those in Need  [phone]     Patient Name: Ruperto Pantoja  YOB: 1924  Age: 89 y.o.    [facility] RN Note:  Hospice consult received, reviewing chart. Will follow up with Unit Nurse and Care Manager to discuss plan of care, patient status and discharge disposition within the hour.     Thank you for the opportunity to be of service to this patient.    TEMO Ansari, RN  Clinical Nurse Liaison  [facility]  [phone]  Office: [phone]  Available on Perfect Serve

## 2024-07-10 NOTE — ACP (ADVANCE CARE PLANNING)
Advance Care Planning     Advance Care Planning (ACP) Physician/NP/PA Conversation    Date of Conversation: 7/5/2024  Conducted with: Healthcare Decision Maker    Healthcare Decision Maker:    Primary Decision Maker: Meera Wolfe - [phone]  Click here to complete Healthcare Decision Makers including selection of the Healthcare Decision Maker Relationship (ie \"Primary\").     Today we documented Decision Maker(s) consistent with Legal Next of Kin hierarchy.    Care Preferences:    Hospitalization:  \"If your health worsens and it becomes clear that your chance of recovery is unlikely, what would be your preference regarding hospitalization?\"  The patient would prefer comfort-focused treatment without hospitalization.    Ventilation:  \"If you were unable to breath on your own and your chance of recovery was unlikely, what would be your preference about the use of a ventilator (breathing machine) if it was available to you?\"  The patient would NOT desire the use of a ventilator.    Resuscitation:  \"In the event your heart stopped as a result of an underlying serious health condition, would you want attempts made to restart your heart, or would you prefer a natural death?\"  No, do NOT attempt to resuscitate.    treatment goals, benefit/burden of treatment options, end of life care preferences (vegetative state/imminent death), and hospice care    Conversation Outcomes / Follow-Up Plan:  ACP in process - completing/providing documents  Reviewed DNR/DNI and patient elects DNR order - referred to ACP Clinical Specialist & placed order      PATIENT admitted with complicated UTI, dementia, multiple wounds that are non healing and worsening, asking for hospice. Patient would like comfort care only, no labs or meds other than comfort meds. Will place comfort orders and place hospice consult.       Length of Voluntary ACP Conversation in minutes:  20 minutes    Cally Zapata MD

## 2024-07-10 NOTE — PROGRESS NOTES
Occupational Therapy Note  7/10/2024    OT treatment attempted at 0826 however patient politely declining (nursing stating pt is refusing to eat this am). Patient stating he doesn't want anything in front of him and sees no point since \"I want to die.\" Patient did ask for some ice water thus refilled (nursing informed and aware of conversation with pt and refill of ice water).     Thank you,  Jennifer Hopkins OTR/L

## 2024-07-10 NOTE — PLAN OF CARE
Problem: Safety - Adult  Goal: Free from fall injury  7/9/2024 2242 by Phoebe Perez RN  Outcome: Progressing  7/9/2024 1302 by Farheen Moncada RN  Outcome: Progressing     Problem: Pain  Goal: Verbalizes/displays adequate comfort level or baseline comfort level  7/9/2024 2242 by Phoebe Perez RN  Outcome: Progressing  7/9/2024 1302 by Farheen Moncada RN  Outcome: Progressing     Problem: Skin/Tissue Integrity  Goal: Absence of new skin breakdown  Description: 1.  Monitor for areas of redness and/or skin breakdown  2.  Assess vascular access sites hourly  3.  Every 4-6 hours minimum:  Change oxygen saturation probe site  4.  Every 4-6 hours:  If on nasal continuous positive airway pressure, respiratory therapy assess nares and determine need for appliance change or resting period.  Outcome: Progressing     Problem: ABCDS Injury Assessment  Goal: Absence of physical injury  Outcome: Progressing     Problem: Skin/Tissue Integrity - Adult  Goal: Skin integrity remains intact  Outcome: Progressing     Problem: Occupational Therapy - Adult  Goal: By Discharge: Performs self-care activities at highest level of function for planned discharge setting.  See evaluation for individualized goals.  Description: FUNCTIONAL STATUS PRIOR TO ADMISSION:  Per chart review, pt with recent admission at [facility] after which pt discharged to [facility] for rehab (daughter and cleaning lady were assisting patient as able). Prior to recent admission at [facility], pt was ambulatory with RW in home and SPC outside of home).     HOME SUPPORT: Per chart review, pt lived alone with daughter and cleaning lady to provide assistance.     Occupational Therapy Goals:  Initiated 7/9/2024  1.  Patient will perform grooming with Stand by Assist within 7 day(s).  2.  Patient will perform upper body dressing with Moderate Assist within 7 day(s).  3.  Patient will participate in upper extremity therapeutic exercise/activities with Stand by Assist for 10

## 2024-07-10 NOTE — PROGRESS NOTES
Hospitalist Progress Note  Cally Zapata MD  Answering service: [phone] OR [phone] from in house phone        Date of Service:  7/10/2024  NAME:  Ruperto Pantoja  :  1924  MRN:  [id number]      Admission Summary/HPI:   89 yo hx of HTN, DM, afib, urine retention w/ andres, presented w/ AMS, complicated UTI, multiple wounds      Interval history / Subjective:   Patient is uncomfortable.  Asking for us to stop everything and let him die.  I comfort care discussion was held with family and he is now on comfort measures with hospice consulted.     Assessment & Plan:     Multiple wounds/left-sided ecchymosis, POA  Acute metabolic encephalopathy 2/2 the above  Acute complicated UTI due to indwelling Andres  Left upper extremity swelling  History of HTN, A-fib, DM, GERD, progressive debility  - Stop IV antibiotics  - Continue Tylenol, IV Dilaudid, p.o. dollop Dilaudid for comfort measures  - Ativan for anxiety  - Consulted palliative  - Consulted hospice  - Continue comfort measures  - Stop all labs and unnecessary vitals  - Patient would prefer inpatient hospice     I have independently reviewed and interpreted patient's lab and other diagnostic data  External notes were reviewed  Critical Care Time: 0 excluding procedures    Code status: DNR  Prophylaxis: Heparin, SCD  Care Plan discussed with: Patient, RN, Multidisciplinary Rounds  Anticipated Disposition:      Principal Problem:    Acute metabolic encephalopathy  Active Problems:    UTI (urinary tract infection)  Resolved Problems:    * No resolved hospital problems. *           Review of Systems:   Review of systems not obtained due to patient factors.         Vital Signs:    Last 24hrs VS reviewed since prior progress note. Most recent are:    Patient Vitals for the past 24 hrs:   Temp Pulse Resp BP SpO2   07/10/24 1642 -- -- 16 -- --   07/10/24 1617 98.4 °F (36.9

## 2024-07-10 NOTE — CARE COORDINATION
7/10/2024   CARE MANAGEMENT NOTE:  CM reviewed EMR for clinical updates. READMISSION:  Pt was admitted to  from 6/22 - 6/27 with multiple open wounds, complicated UTI, debility.  Pt discharged to [facility].  He has readmitted to  from 7/5 with gross hematuria.  Reportedly, pt had been residing alone.     RUR 19%     Transition Plan of Care  CM received consult for hospice and a referral was sent to [facility] for evaluation  Outpatient follow   Mode of transport to be determined pending dispo     CM continue to follow pt for definitive discharge plan.  Edvin

## 2024-07-11 LAB
ECHO BSA: 2.05 M2
GLUCOSE BLD STRIP.AUTO-MCNC: 116 MG/DL (ref 65–117)
GLUCOSE BLD STRIP.AUTO-MCNC: 153 MG/DL (ref 65–117)
SERVICE CMNT-IMP: ABNORMAL
SERVICE CMNT-IMP: NORMAL

## 2024-07-11 PROCEDURE — 94761 N-INVAS EAR/PLS OXIMETRY MLT: CPT

## 2024-07-11 PROCEDURE — 82962 GLUCOSE BLOOD TEST: CPT

## 2024-07-11 PROCEDURE — 1100000000 HC RM PRIVATE

## 2024-07-11 RX ADMIN — COLLAGENASE SANTYL: 250 OINTMENT TOPICAL at 08:12

## 2024-07-11 ASSESSMENT — PAIN SCALES - GENERAL
PAINLEVEL_OUTOF10: 0
PAINLEVEL_OUTOF10: 0

## 2024-07-11 NOTE — CARE COORDINATION
7/11/2024   CARE MANAGEMENT NOTE:  CM reviewed EMR for clinical updates. READMISSION:  Pt was admitted to  from 6/22 - 6/27 with multiple open wounds, complicated UTI, debility.  Pt discharged to [facility].  He has readmitted to  from 7/5 with gross hematuria.  Reportedly, pt had been residing alone.     RUR 19%; LOS 5 days     Transition Plan of Care  [facility] liaison met with pt/family today and plan is for pt to go to a nursing home with hospice under private pay.    CM sent a referral to [facility] per choice  Outpatient follow   Mode of transport to be determined      CM continue to follow pt for placement with hospice  Edvin

## 2024-07-11 NOTE — PALLIATIVE CARE
PALLIATIVE MEDICINE        Consult noted and appreciated.      Care goals established and noted plan to transition to the nursing home with hospice.      I will cancel our consult request.       Thank you,    Telma Nation MSN, FNP-BC, ACHPN

## 2024-07-11 NOTE — PROGRESS NOTES
Hospitalist Progress Note  Cally Zapata MD  Answering service: [phone] OR [phone] from in house phone        Date of Service:  2024  NAME:  Ruperto Pantoja  :  1924  MRN:  [id number]      Admission Summary/HPI:   89 yo hx of HTN, DM, afib, urine retention w/ andres, presented w/ AMS, complicated UTI, multiple wounds      Interval history / Subjective:   Patient is uncomfortable.  Asking for us to stop everything and let him die.  I comfort care discussion was held with family and he is now on comfort measures with hospice consulted.     Assessment & Plan:     Multiple wounds/left-sided ecchymosis, POA  Acute metabolic encephalopathy 2/2 the above  Acute complicated UTI due to indwelling Andres  Left upper extremity swelling  History of HTN, A-fib, DM, GERD, progressive debility  - Stop IV antibiotics  - Continue Tylenol, IV Dilaudid, p.o. dollop Dilaudid for comfort measures  - Ativan for anxiety  - Consulted palliative  - Consulted hospice -does not meet criteria for inpatient, family looking at SNF options  - Continue comfort measures  - Sitter to assist with feedings  - Stop all labs and unnecessary vitals  - Continue to elevate left arm, pain medications     I have independently reviewed and interpreted patient's lab and other diagnostic data  External notes were reviewed  Critical Care Time: 0 excluding procedures    Code status: DNR  Prophylaxis: Heparin, SCD  Care Plan discussed with: Patient, RN, Multidisciplinary Rounds  Anticipated Disposition:      Principal Problem:    Acute metabolic encephalopathy  Active Problems:    UTI (urinary tract infection)  Resolved Problems:    * No resolved hospital problems. *           Review of Systems:   Review of systems not obtained due to patient factors.         Vital Signs:    Last 24hrs VS reviewed since prior progress note. Most recent are:    Patient      ______________________________________________________________________  ACTUAL LENGTH OF STAY:          5                 Cally Zapata MD

## 2024-07-11 NOTE — CARE COORDINATION
07/11/24 1050   Readmission Assessment   Number of Days since last admission? 8-30 days   Previous Disposition Acute Rehab   Who is being Interviewed Unable to Complete   Did you visit your Primary Care Physician after you left the hospital, before you returned this time? No   Why weren't you able to visit your PCP? Other (Comment)  (pt readmitted from [facility])   Who advised the patient to return to the hospital? Physician   Does the patient report anything that got in the way of taking their medications? No

## 2024-07-11 NOTE — CARE COORDINATION
07/11/24 1426   Service Assessment   Patient Orientation Unable to Assess  (pt is on comfort care)   Cognition Other (see comment)  (pt with eyes closed and not answering questions.)   History Provided By Unable to Assess   Primary Caregiver Self   Support Systems Children   Patient's Healthcare Decision Maker is: Patient Declined (Legal Next of Kin Remains as Decision Maker)   PCP Verified by CM Yes  (NP [place])   Last Visit to PCP   (unknown)   Prior Functional Level Independent in ADLs/IADLs   Current Functional Level Other (see comment)  (pt refusing PT/OT)   Can patient return to prior living arrangement No   Ability to make needs known: Other (see comment)  (hospice appropriate (comfort care))   Family able to assist with home care needs: No   Would you like for me to discuss the discharge plan with any other family members/significant others, and if so, who?   (Dtr is NOK)   CM/SW Referral Other (see comment)  (hospice)   Social/Functional History   Lives With Alone   Type of Home House   Home Layout One level   Home Access Stairs to enter with rails   Entrance Stairs - Number of Steps 3   Home Equipment None   ADL Assistance   (pt was indepn now comfort care)   Ambulation Assistance   (pt has been refusing PT/OT)   Occupation Retired   Discharge Planning   Living Arrangements Alone   DME Ordered? Other (comment);No  (hospice will order any DME)   Patient expects to be discharged to: Hospice (comment)  ([facility])   Services At/After Discharge   Services At/After Discharge Hospice   Mode of Transport at Discharge Other (see comment)  (stretcher)   Condition of Participation: Discharge Planning   The Patient and/or Patient Representative was provided with a Choice of Provider? Patient   Freedom of Choice list was provided with basic dialogue that supports the patient's individualized plan of care/goals, treatment preferences, and shares the quality data associated with the providers?  Yes

## 2024-07-11 NOTE — PROGRESS NOTES
Chart reviewed. Noted plan to discharge with hospice services. No aggressive nutrition interventions needed at this time. Will assist as needed.    Diet: ADULT DIET; Easy to Chew; 4 carb choices (60 gm/meal)      Kadi Brush MS, RD, [facility]  Ext: [phone], or via PerfectServe

## 2024-07-11 NOTE — PROGRESS NOTES
[facility]  Good Help to Those in Need  [phone]    Patient Name: Ruperto Pantoja  YOB: 1924  Age: 89 y.o.    [facility] RN Note:  Hospice consult noted. Chart reviewed. Plan of care discussed with patients nurse & care manager.   In to meet with daughter and son in law.   Discussed Hospice philosophy, general plan of care, levels of care, services and on call procedures.    Reviewed hospice and that patient is appropriate for care just not in the hospital. Reviewed how nursing homes works and home. She is open to a nursing home with hospice. Spoke with Tasneem RAMIREZ and told her their 1st choice is [facility].   Spoke with Dr Zapata and told her they are looking at nursing homes.         Thank you for the opportunity to be of service to this patient.   Jaquelin Chowdhury RN  [phone]

## 2024-07-12 LAB
BACTERIA SPEC CULT: NORMAL
BACTERIA SPEC CULT: NORMAL
GLUCOSE BLD STRIP.AUTO-MCNC: 149 MG/DL (ref 65–117)
GLUCOSE BLD STRIP.AUTO-MCNC: 171 MG/DL (ref 65–117)
GLUCOSE BLD STRIP.AUTO-MCNC: 186 MG/DL (ref 65–117)
GLUCOSE BLD STRIP.AUTO-MCNC: 191 MG/DL (ref 65–117)
SERVICE CMNT-IMP: ABNORMAL
SERVICE CMNT-IMP: NORMAL
SERVICE CMNT-IMP: NORMAL

## 2024-07-12 PROCEDURE — 6370000000 HC RX 637 (ALT 250 FOR IP): Performed by: INTERNAL MEDICINE

## 2024-07-12 PROCEDURE — 94761 N-INVAS EAR/PLS OXIMETRY MLT: CPT

## 2024-07-12 PROCEDURE — 1100000000 HC RM PRIVATE

## 2024-07-12 PROCEDURE — 82962 GLUCOSE BLOOD TEST: CPT

## 2024-07-12 RX ADMIN — OXYCODONE 2.5 MG: 5 TABLET ORAL at 06:49

## 2024-07-12 RX ADMIN — TAMSULOSIN HYDROCHLORIDE 0.4 MG: 0.4 CAPSULE ORAL at 09:48

## 2024-07-12 ASSESSMENT — PAIN SCALES - PAIN ASSESSMENT IN ADVANCED DEMENTIA (PAINAD)
TOTALSCORE: 1
CONSOLABILITY: NO NEED TO CONSOLE
CONSOLABILITY: NO NEED TO CONSOLE
TOTALSCORE: 0
FACIALEXPRESSION: SMILING OR INEXPRESSIVE
FACIALEXPRESSION: SMILING OR INEXPRESSIVE
BODYLANGUAGE: RELAXED
BREATHING: NORMAL
FACIALEXPRESSION: SMILING OR INEXPRESSIVE
CONSOLABILITY: NO NEED TO CONSOLE
NEGVOCALIZATION: OCCASIONAL MOAN/GROAN, LOW SPEECH, NEGATIVE/DISAPPROVING QUALITY
CONSOLABILITY: NO NEED TO CONSOLE
BODYLANGUAGE: TENSE, DISTRESSED PACING, FIDGETING
FACIALEXPRESSION: SMILING OR INEXPRESSIVE
BREATHING: OCCASIONAL LABORED BREATHING, SHORT PERIOD OF HYPERVENTILATION
TOTALSCORE: 0
BODYLANGUAGE: RELAXED
BREATHING: NORMAL
BODYLANGUAGE: RELAXED
BREATHING: NORMAL
TOTALSCORE: 3
BODYLANGUAGE: RELAXED
BREATHING: NORMAL
TOTALSCORE: 0
NEGVOCALIZATION: OCCASIONAL MOAN/GROAN, LOW SPEECH, NEGATIVE/DISAPPROVING QUALITY
CONSOLABILITY: NO NEED TO CONSOLE
FACIALEXPRESSION: SMILING OR INEXPRESSIVE

## 2024-07-12 ASSESSMENT — PAIN SCALES - GENERAL
PAINLEVEL_OUTOF10: 0
PAINLEVEL_OUTOF10: 0

## 2024-07-12 NOTE — PROGRESS NOTES
[facility]  Good Help to Those in Need  [phone]    Patient Name: Ruperto Pantoja  YOB: 1924  Age: 89 y.o.    [facility] RN Note:  Hospice consult noted. Chart reviewed. Plan of care discussed with patients nurse & care manager.     Spoke with CM and the plan is for him to go to [facility] on Monday with our services.  Reserved a time slot for 11 am admit.       Thank you for the opportunity to be of service to this patient.   Jaquelin Chowdhury RN  [phone]

## 2024-07-12 NOTE — PROGRESS NOTES
hours.    Invalid input(s): \"CREA\", \"CA\", \"URICA\"    No results for input(s): \"ALT\", \"TP\", \"GLOB\", \"GGT\" in the last 72 hours.    Invalid input(s): \"SGOT\", \"GPT\", \"AP\", \"TBIL\", \"TBILI\", \"ALB\", \"AML\", \"AMYP\", \"LPSE\", \"HLPSE\"  No results for input(s): \"INR\", \"APTT\" in the last 72 hours.    Invalid input(s): \"PTP\"   No results for input(s): \"TIBC\" in the last 72 hours.    Invalid input(s): \"FE\", \"PSAT\", \"FERR\"   No results found for: \"RBCF\"   No results for input(s): \"PH\", \"PCO2\", \"PO2\" in the last 72 hours.  No results for input(s): \"CPK\" in the last 72 hours.    Invalid input(s): \"CPKMB\", \"CKNDX\", \"TROIQ\"  Lab Results   Component Value Date/Time    CHOL 131 04/08/2024 11:41 AM    CHOL 134 03/08/2023 03:50 PM    HDL 51 04/08/2024 11:41 AM    LDL 52 04/08/2024 11:41 AM     No results found for: \"GLUCPOC\"      Medications Reviewed:     Current Facility-Administered Medications   Medication Dose Route Frequency    morphine 20MG/ML concentrated solution 10 mg  10 mg Oral Q2H PRN    scopolamine (TRANSDERM-SCOP) transdermal patch 1 patch  1 patch TransDERmal Q72H    LORazepam (ATIVAN) 2 MG/ML concentrated solution 1 mg  1 mg Oral Q2H PRN    [Held by provider] HYDROmorphone (DILAUDID) injection 0.5 mg  0.5 mg IntraVENous Q3H PRN    Or    [Held by provider] HYDROmorphone HCl PF (DILAUDID) injection 1 mg  1 mg IntraVENous Q3H PRN    [Held by provider] diazePAM (VALIUM) injection 5 mg  5 mg IntraVENous Q4H PRN    oxyCODONE (ROXICODONE) immediate release tablet 2.5 mg  2.5 mg Oral Q4H PRN    0.9 % sodium chloride infusion   IntraVENous PRN    ondansetron (ZOFRAN) injection 4 mg  4 mg IntraVENous Q6H PRN    acetaminophen (TYLENOL) tablet 650 mg  650 mg Oral Q6H PRN    Or    acetaminophen (TYLENOL) suppository 650 mg  650 mg Rectal Q6H PRN    tamsulosin (FLOMAX) capsule 0.4 mg  0.4 mg Oral Daily     ______________________________________________________________________  ACTUAL LENGTH OF STAY:          [address] [place] [address] JAVIER  MD Benny

## 2024-07-12 NOTE — CARE COORDINATION
7/12/2024   CARE MANAGEMENT NOTE:  CM reviewed EMR for clinical updates. READMISSION:  Pt was admitted to  from 6/22 - 6/27 with multiple open wounds, complicated UTI, debility.  Pt discharged to [facility].  He has readmitted to  from 7/5 with gross hematuria.  Reportedly, pt had been residing alone.  Dtr Meera Wolfe ([phone])     RUR 19%; LOS 5 days     Transition Plan of Care  CM spoke to pt's dtr via phone on 7/12 and she is agreeable to hospice at [facility].  A referral was sent for review and await bed availability  [facility] would order any necessary DME ie hospital bed etc  Outpatient follow   4.   Stretcher transport will be needed     CM continue to follow pt for nursing home with hospice (anticipate discharge next week)  Edvin

## 2024-07-13 VITALS
DIASTOLIC BLOOD PRESSURE: 47 MMHG | TEMPERATURE: 98.6 F | RESPIRATION RATE: 17 BRPM | WEIGHT: 216.05 LBS | HEART RATE: 51 BPM | BODY MASS INDEX: 33.91 KG/M2 | OXYGEN SATURATION: 95 % | SYSTOLIC BLOOD PRESSURE: 111 MMHG | HEIGHT: 67 IN

## 2024-07-13 LAB
GLUCOSE BLD STRIP.AUTO-MCNC: 173 MG/DL (ref 65–117)
SERVICE CMNT-IMP: ABNORMAL

## 2024-07-13 PROCEDURE — 82962 GLUCOSE BLOOD TEST: CPT

## 2024-07-13 PROCEDURE — 94761 N-INVAS EAR/PLS OXIMETRY MLT: CPT

## 2024-07-13 PROCEDURE — 6370000000 HC RX 637 (ALT 250 FOR IP): Performed by: INTERNAL MEDICINE

## 2024-07-13 RX ORDER — LORAZEPAM 2 MG/ML
0.5 CONCENTRATE ORAL EVERY 6 HOURS
Status: DISCONTINUED | OUTPATIENT
Start: 2024-07-13 | End: 2024-07-13 | Stop reason: HOSPADM

## 2024-07-13 RX ORDER — MORPHINE SULFATE 20 MG/ML
5 SOLUTION ORAL EVERY 6 HOURS
Status: DISCONTINUED | OUTPATIENT
Start: 2024-07-13 | End: 2024-07-13 | Stop reason: HOSPADM

## 2024-07-13 RX ADMIN — MORPHINE SULFATE 5 MG: 100 SOLUTION ORAL at 12:18

## 2024-07-13 RX ADMIN — LORAZEPAM 0.5 MG: 2 SOLUTION, CONCENTRATE ORAL at 12:18

## 2024-07-13 ASSESSMENT — PAIN SCALES - PAIN ASSESSMENT IN ADVANCED DEMENTIA (PAINAD)
BREATHING: NORMAL
FACIALEXPRESSION: SMILING OR INEXPRESSIVE
BODYLANGUAGE: RELAXED
TOTALSCORE: 0
CONSOLABILITY: NO NEED TO CONSOLE

## 2024-07-13 ASSESSMENT — PAIN SCALES - GENERAL: PAINLEVEL_OUTOF10: 0

## 2024-07-13 NOTE — PROGRESS NOTES
[facility]  Good Help to Those in Need  [phone]    Inpatient Nursing Admission   Patient Name: Ruperto Pantoja  YOB: 1924  Age: 89 y.o.    Date of Hospice Admission: 7/13/2024  Hospice Attending Elected by Patient: Jamie Vale MD  Primary Care Physician: Brooks Goldman, APRN - NP  Admitting RN: Jannet Eid  : SHRUTI    Level of Care (GIP/Routine/Respite): GIP  Facility of Care: [facility]  Patient Room: 520/01     HOSPICE SUMMARY   ER Visits/ Hospitalizations in past year: 2  Hospice Diagnosis: Acute metabolic encephalopathy [G93.41]  Onset Date of Hospice Diagnosis: 7/12/24  Summary of Disease Progression Leading to Hospice Diagnosis:   Per MD notes:  The patient is a 89 yo hx of HTN, DM, afib, falls, urinary retention/Pa, prostate CA hx, who came from facility with AMS, complicated UTI, large unstageable wounds. He is a poor historian. He was admitted to [facility] 6/22 for unwitnessed fall in yard, down unknown length, with large abrasions, mainly on left side, clavicle fracture, dehydration, hypotension, fevers. He was sent to rehab and returned to ED one week later for AMS, fevers, with concern for sepsis. WBC 14.4. U/A significant for a UTI. He has multiple open wounds, large area of bruising. He was admitted and IV fluids and antibiotics given. As he has not made significant progress despite aggressive care and daughter wishes comfort measures with the support of hospice.    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

## 2024-07-13 NOTE — PROGRESS NOTES
Hospitalist Progress Note  Cally Zapata MD  Answering service: [phone] OR [phone] from in house phone        Date of Service:  2024  NAME:  Ruperto Pantoja  :  1924  MRN:  [id number]      Admission Summary/HPI:   89 yo hx of HTN, DM, afib, urine retention w/ andres, presented w/ AMS, complicated UTI, multiple wounds      Interval history / Subjective:   Patient is now comfortable.  He is more confused than yesterday, and he is struggling to drink water.  He coughs whenever he tries to drink.  He is no longer capable of asking for pain medications and they have been changed from as needed to scheduled.  Discussed with hospice.  Discussed with family at bedside.     Assessment & Plan:     Multiple open/large wounds/left-sided ecchymosis, POA  Acute metabolic encephalopathy 2/2 the above  Acute complicated UTI due to indwelling Andres  Left upper extremity swelling  History of HTN, A-fib, DM, GERD, progressive debility  - Stop IV antibiotics  - Continue Tylenol  - Scheduled oral morphine for pain  - As needed oxycodone  - Schedule oral Ativan for anxiety  - Consulted hospice -they are following  -- [facility] pending with  hospice  - Continue comfort measures  - Sitter to assist with feedings  - Stop all labs and unnecessary vitals  - Continue to elevate left arm, pain medications     I have independently reviewed and interpreted patient's lab and other diagnostic data  External notes were reviewed  Critical Care Time: 0 excluding procedures    Code status: DNR  Prophylaxis: Heparin, SCD  Care Plan discussed with: Patient, RN, Multidisciplinary Rounds  Anticipated Disposition:      Principal Problem:    Acute metabolic encephalopathy  Active Problems:    UTI (urinary tract infection)  Resolved Problems:    * No resolved hospital problems. *           Review of Systems:   Review of systems not obtained  hours.    Invalid input(s): \"CREA\", \"CA\", \"URICA\"    No results for input(s): \"ALT\", \"TP\", \"GLOB\", \"GGT\" in the last 72 hours.    Invalid input(s): \"SGOT\", \"GPT\", \"AP\", \"TBIL\", \"TBILI\", \"ALB\", \"AML\", \"AMYP\", \"LPSE\", \"HLPSE\"  No results for input(s): \"INR\", \"APTT\" in the last 72 hours.    Invalid input(s): \"PTP\"   No results for input(s): \"TIBC\" in the last 72 hours.    Invalid input(s): \"FE\", \"PSAT\", \"FERR\"   No results found for: \"RBCF\"   No results for input(s): \"PH\", \"PCO2\", \"PO2\" in the last 72 hours.  No results for input(s): \"CPK\" in the last 72 hours.    Invalid input(s): \"CPKMB\", \"CKNDX\", \"TROIQ\"  Lab Results   Component Value Date/Time    CHOL 131 04/08/2024 11:41 AM    CHOL 134 03/08/2023 03:50 PM    HDL 51 04/08/2024 11:41 AM    LDL 52 04/08/2024 11:41 AM     No results found for: \"GLUCPOC\"      Medications Reviewed:     Current Facility-Administered Medications   Medication Dose Route Frequency    LORazepam (ATIVAN) 2 MG/ML concentrated solution 0.5 mg  0.5 mg Oral Q6H    morphine 20MG/ML concentrated solution 5 mg  5 mg Oral Q6H    [Held by provider] HYDROmorphone (DILAUDID) injection 0.5 mg  0.5 mg IntraVENous Q3H PRN    Or    [Held by provider] HYDROmorphone HCl PF (DILAUDID) injection 1 mg  1 mg IntraVENous Q3H PRN    [Held by provider] diazePAM (VALIUM) injection 5 mg  5 mg IntraVENous Q4H PRN    oxyCODONE (ROXICODONE) immediate release tablet 2.5 mg  2.5 mg Oral Q4H PRN    0.9 % sodium chloride infusion   IntraVENous PRN    ondansetron (ZOFRAN) injection 4 mg  4 mg IntraVENous Q6H PRN    acetaminophen (TYLENOL) tablet 650 mg  650 mg Oral Q6H PRN    Or    acetaminophen (TYLENOL) suppository 650 mg  650 mg Rectal Q6H PRN    tamsulosin (FLOMAX) capsule 0.4 mg  0.4 mg Oral Daily     ______________________________________________________________________  ACTUAL LENGTH OF STAY:          7                 Cally Zapata MD

## 2024-07-13 NOTE — DISCHARGE SUMMARY
Discharge Summary       PATIENT ID: Ruperto Pantoja  MRN: [id number]   YOB: 1924    DATE OF ADMISSION: 7/5/2024  9:38 PM    DATE OF DISCHARGE: 7/13/2024 1:07 PM  PRIMARY CARE PROVIDER: Brooks Goldman APRN - NP     ATTENDING PHYSICIAN: Cally Zapata MD  DISCHARGING PROVIDER: Cally Zapata MD    To contact this individual call [phone] and ask the  to page.  If unavailable ask to be transferred the Adult Hospitalist Department.    CONSULTATIONS: IP WOUND CARE NURSE CONSULT TO EVAL  IP CONSULT TO CASE MANAGEMENT  IP CONSULT TO HOSPICE    PROCEDURES/SURGERIES: * No surgery found *    ADMITTING DIAGNOSES & HOSPITAL COURSE:   89 yo hx of HTN, DM, afib, urine retention w/ andres, presented w/ AMS, complicated UTI, multiple wounds             DISCHARGE DIAGNOSES / PLAN:       Multiple open/large wounds/left-sided ecchymosis, POA  Acute metabolic encephalopathy 2/2 the above  Acute complicated UTI due to indwelling Andres  Left upper extremity swelling  History of HTN, A-fib, DM, GERD, progressive debility  - Stop IV antibiotics  - Continue Tylenol  - Scheduled oral morphine for pain  - As needed oxycodone  - Schedule oral Ativan for anxiety  - Consulted hospice  - Patient is now being discharged to the hospice service for further inpatient care.       PENDING TEST RESULTS:   At the time of discharge the following test results are still pending: None    FOLLOW UP APPOINTMENTS:    Brooks Goldman APRN - NP  [address] [place] [id number] [phone]    Schedule an appointment as soon as possible for a visit          ADDITIONAL CARE RECOMMENDATIONS: Further care per hospice    DIET:  ADULT DIET; Easy to Chew; 4 carb choices (60 gm/meal)    ACTIVITY: activity as tolerated    WOUND CARE: Per hospice    DISCHARGE MEDICATIONS:     Medication List        CONTINUE taking these medications      apixaban 5 MG Tabs tablet  Commonly known as: Eliquis  Take 1 tablet by mouth 2 times daily  PM

## 2024-07-13 NOTE — PLAN OF CARE
Problem: Safety - Adult  Goal: Free from fall injury  Outcome: Progressing     Problem: Skin/Tissue Integrity  Goal: Absence of new skin breakdown  Description: 1.  Monitor for areas of redness and/or skin breakdown  2.  Assess vascular access sites hourly  3.  Every 4-6 hours minimum:  Change oxygen saturation probe site  4.  Every 4-6 hours:  If on nasal continuous positive airway pressure, respiratory therapy assess nares and determine need for appliance change or resting period.  Outcome: Progressing     Problem: Skin/Tissue Integrity - Adult  Goal: Incisions, wounds, or drain sites healing without S/S of infection  Outcome: Progressing  Goal: Oral mucous membranes remain intact  Outcome: Progressing     Problem: Genitourinary - Adult  Goal: Absence of urinary retention  Outcome: Progressing  Goal: Urinary catheter remains patent  Outcome: Progressing     Problem: Infection - Adult  Goal: Absence of infection during hospitalization  Outcome: Progressing     Problem: Hospice Orientation  Goal: Demonstrate understanding of hospice philosophy, plan of care, and inpatient hospice program  Description: The patient/family/caregiver will demonstrate understanding of hospice philosophy, plan of care and the inpatient hospice program as evidenced by participation in meeting the patient's psychosocial, spiritual, medical, and physical needs inclusive of medical supplies/equipment focusing on symptoms.  Outcome: Progressing     Problem: Potential for Alteration in Skin Integrity  Goal: Monitor skin for areas of alteration in skin integrity  Description: Patient [unfilled] will remain free from alterations of or worsening skin integrity as evidenced by no changes to skin during assessment each shift during the inpatient hospice stay.  Outcome: Progressing     Problem: Risk for Falls  Goal: Fall prevention  Description: Patient  will remain free from falls as evidenced by no witnessed or reported falls each shift during the

## 2024-07-14 NOTE — PROGRESS NOTES
[facility]  [facility]  [phone]    GIP Daily Nursing Note   Patient Name: Ruperto Pantoja  YOB: 1924  Age: 89 y.o.    Date of Visit: 24  Facility of Care: [facility]  Patient Room: [facility]/     Hospice Attending: Jamei Vale MD  Hospice Diagnosis: Acute metabolic encephalopathy [G93.41]    Level of Care: GIP    Current GIP Symptoms    1. Minimally responsive to lindsay stimuli  2. Labored breathing improved w/ scheduled meds  3. +3 edema to feet   4. Pain appears improved with scheduled meds  5. Feet cool to touch, pulses thready         ASSESSMENT & PLAN   Must update Plan of Care including visit frequencies for IDT members  1.  1Continue GIP LOC for symptom management of pain, dyspnea, agitation, complex wounds  2. Pain and dyspnea. Dilaudid 1mg IV every 4 hours and PRN.  3. Agitation, terminal restlessness.  Valium 5mg IV every 4 hours and PRN Versed 2mg every 15min. Fall precautions in place.  4. Wound care as needed for drainage.   5. Comfort order set for breakthrough symptoms.  6. Turn and reposition, provide oral care every 3-4 hours to protect skin integrity. Family may refuse.  7. Pa and IV site care per hospital protocol.  8. Frequent assessments by skilled medical staff for non-verbal cues of distress/discomfort   9. Support and educate family at bedside.  10. MSW and  visits as needed for emotional, spiritual support.      Spiritual Interventions: hospice chaplain visits per protocol    Psych/ Social/ Emotional Interventions: no family present at time of visit, update provided to daughter Meera who reports they will be at the hospital alter today    Care Coordination Needs: SW to reach out to family Monday re  home needs    Care plan and New Orders discussed / approved with Dr Kavin MENA.    Description History and Chart Review   If this is initial GIP note must document RN assessment/MD communication in previous setting.

## 2024-07-14 NOTE — PLAN OF CARE
Problem: Chronic Conditions and Co-morbidities  Goal: Patient's chronic conditions and co-morbidity symptoms are monitored and maintained or improved  7/13/2024 1931 by Jannet Eid RN  Outcome: Progressing     Problem: Safety - Adult  Goal: Free from fall injury  7/13/2024 2120 by Maryellen Huitron RN  Outcome: Progressing  7/13/2024 1931 by Jannet Eid RN  Outcome: Progressing     Problem: Skin/Tissue Integrity  Goal: Absence of new skin breakdown  Description: 1.  Monitor for areas of redness and/or skin breakdown  2.  Assess vascular access sites hourly  3.  Every 4-6 hours minimum:  Change oxygen saturation probe site  4.  Every 4-6 hours:  If on nasal continuous positive airway pressure, respiratory therapy assess nares and determine need for appliance change or resting period.  7/13/2024 2120 by Maryellen Huitron RN  Outcome: Progressing  7/13/2024 1931 by Jannet Eid RN  Outcome: Progressing

## 2024-07-14 NOTE — H&P
[facility]   [facility]  [phone]     Patient Name:  Ruperto Pantoja  YOB: 1924         Date of Provider Hospice Visit: 07/14/24     Level of Care:   [x] General Inpatient (GIP)              [] Routine                   [] Respite     Current Location of Care:  [] [facility]           [x] [facility]         [] [facility]        [] [facility]           [] Hospice House ([facility])     IF [facility], patient referred from:  [] [facility]           [] [facility]         [] [facility]        [] [facility]           [] Home         [] Other:      Date of Original Hospice Admission:  7/13/2024  1:24 PM   Hospice Medical Director at time of admission: Tucker      Principle Hospice Diagnosis:   Acute metabolic encephalopathy [G93.41]   Diagnoses RELATED to the terminal prognosis: Confusion, agitation, complicated UTI, Nonhealing wounds  Other Diagnoses: Dementia, debility     HOSPICE SUMMARY   Ruperto Pantoja is a 89 yo M with pmhx incl urine retention w/ andres, dementia, a fib, HTN, DM, wounds. He was at [facility] several weeks ago for fall and dehydration and then admitted from SNF on 7/6/24 with fevers and confusion. Started treating for UTI, mult wounds found. Confused- metabolic encephalopathy. Pt has told staff that he wishes to stop aggressive measures and be allowed to die - changed to comfort measures only after attending had family meeting on 7/10/24. Plan had been for hospice at a facility, however on 7/13/24 mental status changed with worsening confusion, lethargy, agitation, shortness of breath.     The patient's principle diagnosis has resulted in end of life care.  Refer to LCD      Functionally, the patient's Karnofsky and/or Palliative Performance Scale has declined over a period of weeks and is estimated at 10-20. The patient is dependent on the following ADLs:all     Objective information that support this patients limited prognosis includes: See note above.      WBC 14.4 on 7/6  Mult wounds with wound cx + for GNR

## 2024-07-15 PROBLEM — G93.41 ACUTE METABOLIC ENCEPHALOPATHY: Status: ACTIVE | Noted: 2024-01-01

## 2024-07-15 PROBLEM — R45.1 RESTLESSNESS AND AGITATION: Status: ACTIVE | Noted: 2024-01-01

## 2024-07-15 PROBLEM — Z51.5 HOSPICE CARE: Status: ACTIVE | Noted: 2024-01-01

## 2024-07-15 PROBLEM — R52 NONVERBAL SIGNS OF PAIN: Status: ACTIVE | Noted: 2024-01-01

## 2024-07-15 NOTE — PROGRESS NOTES
Bed is open at [facility] today, patient stable for transfer. Family contacted and on board w/ plan. Please leave IV and andres intact for transfer. Can call bedside report to [phone]. Hospice SW to arrange transport for [facility]  [facility]  [phone]    [facility] Daily Nursing Note   Patient Name: Ruperto Pantoja  YOB: 1924  Age: 89 y.o.    Date of Visit: 07/15/24  Facility of Care: [facility]  Patient Room: 520/01     Hospice Attending: Jamie Vale MD  Hospice Diagnosis: Acute metabolic encephalopathy [G93.41]    Level of Care: GIP    Current GIP Symptoms    1. Minimally responsive to lindsay stimuli  2. Labored breathing improved w/ scheduled meds  3. +3 edema to feet   4. Pain appears improved with scheduled meds  5. Feet cool to touch, pulses thready         ASSESSMENT & PLAN   Must update Plan of Care including visit frequencies for IDT members  1.  1Continue GIP LOC for symptom management of pain, dyspnea, agitation, complex wounds  2. Pain and dyspnea. Dilaudid 1mg IV every 4 hours and PRN.  3. Agitation, terminal restlessness.  Valium 5mg IV every 4 hours and PRN Versed 2mg every 15min. Fall precautions in place.  4. Wound care as needed for drainage.   5. Comfort order set for breakthrough symptoms.  6. Turn and reposition, provide oral care every 3-4 hours to protect skin integrity. Family may refuse.  7. Andres and IV site care per hospital protocol.  8. Frequent assessments by skilled medical staff for non-verbal cues of distress/discomfort   9. Support and educate family at bedside.  10. MSW and  visits as needed for emotional, spiritual support.      Spiritual Interventions: hospice chaplain visits per protocol    Psych/ Social/ Emotional Interventions: no family present at time of visit, update provided to daughter Meera who reports they will be at the hospital alter today    Care Coordination Needs: SW to reach out to family Monday re

## 2024-07-15 NOTE — PLAN OF CARE
Problem: Chronic Conditions and Co-morbidities  Goal: Patient's chronic conditions and co-morbidity symptoms are monitored and maintained or improved  Outcome: Progressing     Problem: Safety - Adult  Goal: Free from fall injury  Outcome: Progressing     Problem: Skin/Tissue Integrity  Goal: Absence of new skin breakdown  Description: 1.  Monitor for areas of redness and/or skin breakdown  2.  Assess vascular access sites hourly  3.  Every 4-6 hours minimum:  Change oxygen saturation probe site  4.  Every 4-6 hours:  If on nasal continuous positive airway pressure, respiratory therapy assess nares and determine need for appliance change or resting period.  Outcome: Progressing     Problem: Pain  Goal: Verbalizes/displays adequate comfort level or baseline comfort level  Outcome: Progressing     Problem: Skin/Tissue Integrity - Adult  Goal: Incisions, wounds, or drain sites healing without S/S of infection  Outcome: Progressing  Goal: Oral mucous membranes remain intact  Outcome: Progressing     Problem: Genitourinary - Adult  Goal: Absence of urinary retention  Outcome: Progressing  Goal: Urinary catheter remains patent  Outcome: Progressing     Problem: Infection - Adult  Goal: Absence of infection during hospitalization  Outcome: Progressing     Problem: Hospice Orientation  Goal: Demonstrate understanding of hospice philosophy, plan of care, and inpatient hospice program  Description: The patient/family/caregiver will demonstrate understanding of hospice philosophy, plan of care and the inpatient hospice program as evidenced by participation in meeting the patient's psychosocial, spiritual, medical, and physical needs inclusive of medical supplies/equipment focusing on symptoms.  Outcome: Progressing     Problem: Potential for Alteration in Skin Integrity  Goal: Monitor skin for areas of alteration in skin integrity  Description: Patient [unfilled] will remain free from alterations of or worsening skin integrity as

## 2024-07-15 NOTE — PLAN OF CARE
Problem: Safety - Adult  Goal: Free from fall injury  Outcome: Progressing     Problem: Skin/Tissue Integrity  Goal: Absence of new skin breakdown  Description: 1.  Monitor for areas of redness and/or skin breakdown  2.  Assess vascular access sites hourly  3.  Every 4-6 hours minimum:  Change oxygen saturation probe site  4.  Every 4-6 hours:  If on nasal continuous positive airway pressure, respiratory therapy assess nares and determine need for appliance change or resting period.  Outcome: Progressing     Problem: Pain  Goal: Verbalizes/displays adequate comfort level or baseline comfort level  Outcome: Progressing     Problem: Skin/Tissue Integrity - Adult  Goal: Incisions, wounds, or drain sites healing without S/S of infection  Outcome: Progressing

## 2024-07-20 ENCOUNTER — HOME CARE VISIT (OUTPATIENT)
Age: 89
End: 2024-07-20
Payer: MEDICARE

## 2024-07-31 NOTE — PROGRESS NOTES
Physician Progress Note      PATIENT:               BRAD THOMAS  CSN #:                  [id number]  :                       1924  ADMIT DATE:       2024 9:38 PM  DISCH DATE:        2024 1:19 PM  RESPONDING  PROVIDER #:        Cally Zapata MD          QUERY TEXT:    Good morning.    Patient admitted with multiple wounds and left side ecchymosis, confusion.    Noted documentation of complicated UTI in  H&P, IM Progress Notes dated   - and  DC Summary.   urine culture showed no growth.    In order to support the diagnosis of UTI, please include additional clinical   indicators in your documentation.  Or please document if the diagnosis of UTI   has been ruled out after further study.    The medical record reflects the following:    Risk Factors: 89 yo hx of HTN, DM, afib, urine retention w/ andres, presented   w/ AMS, multiple wounds    Clinical Indicators:  Urine Cultures: No growth (<1,000 cfu/ml);    01:07 urinalysis: small blood, 30 protein, positive nitrite, moderate   leukocyte esterase, 20-50 WBC, > 100 RBC, few epithelial cells, 1+ bacteria;    06:32 urinalysis: 40 ketones, large blood, 30 protein, small leukocyte   esterase, 10-20 WBC, moderate epithelial cells; from  H&P: \"Complicated   UTI due to an indwelling andres:  Will monitor cultures.  Will change andres.    Start empiric IV CTX\"; from IM PN dated -: \"CAUTI  POA: Hematuria   reported noted in Andres cath\"; from IM PNs dated -: \"Concern for   complicated UTI due to an indwelling andres:  urine cx with no growth.  Andres   in place.  On IV abx \"; from 07/10- IM PN and  DCS: \"Acute   complicated UTI due to indwelling Andres\"    Treatment: Urinalysis, Urine Cultures, IV Cefepime, IV Vancomycin      Thank you,  Trinity Pederson RN, CDI  Options provided:  -- UTI present as evidenced by, Please document evidence.  -- UTI was ruled out  -- Other - I will add my own